# Patient Record
Sex: FEMALE | Race: WHITE | Employment: OTHER | URBAN - METROPOLITAN AREA
[De-identification: names, ages, dates, MRNs, and addresses within clinical notes are randomized per-mention and may not be internally consistent; named-entity substitution may affect disease eponyms.]

---

## 2018-03-29 ENCOUNTER — HOSPITAL ENCOUNTER (OUTPATIENT)
Dept: INTERVENTIONAL RADIOLOGY/VASCULAR | Age: 78
Discharge: HOME | End: 2018-03-31
Payer: MEDICARE

## 2018-03-29 DIAGNOSIS — M79.605 PAIN OF LEFT LOWER EXTREMITY: ICD-10-CM

## 2018-03-29 DIAGNOSIS — R60.0 EDEMA OF LEG: ICD-10-CM

## 2018-03-29 DIAGNOSIS — I73.9 PVD (PERIPHERAL VASCULAR DISEASE) (HCC): ICD-10-CM

## 2018-03-29 PROCEDURE — 93971 EXTREMITY STUDY: CPT

## 2018-03-29 PROCEDURE — 93923 UPR/LXTR ART STDY 3+ LVLS: CPT

## 2018-05-21 ENCOUNTER — HOSPITAL ENCOUNTER (OUTPATIENT)
Dept: GENERAL RADIOLOGY | Age: 78
Discharge: HOME | End: 2018-05-23
Payer: MEDICARE

## 2018-05-21 ENCOUNTER — HOSPITAL ENCOUNTER (OUTPATIENT)
Dept: CT IMAGING | Age: 78
Discharge: HOME | End: 2018-05-21
Payer: MEDICARE

## 2018-05-21 ENCOUNTER — HOSPITAL ENCOUNTER (OUTPATIENT)
Age: 78
Discharge: HOME | End: 2018-05-23
Payer: MEDICARE

## 2018-05-21 DIAGNOSIS — R51.9 ACUTE INTRACTABLE HEADACHE, UNSPECIFIED HEADACHE TYPE: ICD-10-CM

## 2018-05-21 DIAGNOSIS — M25.541 ARTHRALGIA OF BOTH HANDS: ICD-10-CM

## 2018-05-21 DIAGNOSIS — M25.542 ARTHRALGIA OF BOTH HANDS: ICD-10-CM

## 2018-05-21 PROCEDURE — 6360000004 HC RX CONTRAST MEDICATION: Performed by: RADIOLOGY

## 2018-05-21 PROCEDURE — 73030 X-RAY EXAM OF SHOULDER: CPT

## 2018-05-21 PROCEDURE — 70470 CT HEAD/BRAIN W/O & W/DYE: CPT

## 2018-05-21 RX ADMIN — IOPAMIDOL 50 ML: 755 INJECTION, SOLUTION INTRAVENOUS at 10:46

## 2019-01-03 ENCOUNTER — HOSPITAL ENCOUNTER (OUTPATIENT)
Dept: GENERAL RADIOLOGY | Age: 79
Discharge: HOME | End: 2019-01-05
Payer: MEDICARE

## 2019-01-03 ENCOUNTER — HOSPITAL ENCOUNTER (OUTPATIENT)
Dept: MAMMOGRAPHY | Age: 79
Discharge: HOME | End: 2019-01-05
Payer: MEDICARE

## 2019-01-03 ENCOUNTER — HOSPITAL ENCOUNTER (OUTPATIENT)
Age: 79
Discharge: HOME | End: 2019-01-05
Payer: MEDICARE

## 2019-01-03 ENCOUNTER — HOSPITAL ENCOUNTER (OUTPATIENT)
Dept: ULTRASOUND IMAGING | Age: 79
End: 2019-01-03
Payer: MEDICARE

## 2019-01-03 DIAGNOSIS — Z12.39 BREAST SCREENING: ICD-10-CM

## 2019-01-03 DIAGNOSIS — N64.4 BREAST PAIN: ICD-10-CM

## 2019-01-03 DIAGNOSIS — M25.552 LEFT HIP PAIN: ICD-10-CM

## 2019-01-03 PROCEDURE — 77066 DX MAMMO INCL CAD BI: CPT

## 2019-01-03 PROCEDURE — 73502 X-RAY EXAM HIP UNI 2-3 VIEWS: CPT

## 2020-01-16 ENCOUNTER — HOSPITAL ENCOUNTER (OUTPATIENT)
Dept: MAMMOGRAPHY | Age: 80
Discharge: HOME | End: 2020-01-18
Payer: MEDICARE

## 2020-01-16 PROCEDURE — 77067 SCR MAMMO BI INCL CAD: CPT

## 2020-02-19 ENCOUNTER — HOSPITAL ENCOUNTER (OUTPATIENT)
Dept: MAMMOGRAPHY | Age: 80
Discharge: HOME | End: 2020-02-21
Payer: MEDICARE

## 2020-02-19 ENCOUNTER — HOSPITAL ENCOUNTER (OUTPATIENT)
Dept: ULTRASOUND IMAGING | Age: 80
End: 2020-02-19
Payer: MEDICARE

## 2020-02-19 PROCEDURE — 77065 DX MAMMO INCL CAD UNI: CPT

## 2020-04-09 ENCOUNTER — APPOINTMENT (OUTPATIENT)
Dept: GENERAL RADIOLOGY | Age: 80
DRG: 444 | End: 2020-04-09
Payer: MEDICARE

## 2020-04-09 ENCOUNTER — HOSPITAL ENCOUNTER (INPATIENT)
Age: 80
LOS: 6 days | Discharge: NURSING, REHAB OR LONG-TERM CARE | DRG: 444 | End: 2020-04-15
Attending: EMERGENCY MEDICINE | Admitting: INTERNAL MEDICINE
Payer: MEDICARE

## 2020-04-09 ENCOUNTER — APPOINTMENT (OUTPATIENT)
Dept: CT IMAGING | Age: 80
DRG: 444 | End: 2020-04-09
Payer: MEDICARE

## 2020-04-09 PROBLEM — A41.9 SEPSIS (HCC): Status: ACTIVE | Noted: 2020-04-09

## 2020-04-09 PROBLEM — J96.01 ACUTE RESPIRATORY FAILURE WITH HYPOXIA (HCC): Status: ACTIVE | Noted: 2020-04-09

## 2020-04-09 LAB
ALBUMIN SERPL-MCNC: 3.5 G/DL (ref 3.5–5.2)
ALP BLD-CCNC: 1980 U/L (ref 35–104)
ALT SERPL-CCNC: 197 U/L (ref 0–32)
AMMONIA: 44 UMOL/L (ref 11–51)
ANION GAP SERPL CALCULATED.3IONS-SCNC: 23 MMOL/L (ref 7–16)
APTT: 33.3 SEC (ref 24.5–35.1)
AST SERPL-CCNC: 370 U/L (ref 0–31)
BACTERIA: ABNORMAL /HPF
BASOPHILS ABSOLUTE: 0.05 E9/L (ref 0–0.2)
BASOPHILS RELATIVE PERCENT: 0.3 % (ref 0–2)
BILIRUB SERPL-MCNC: 4.4 MG/DL (ref 0–1.2)
BILIRUBIN DIRECT: 2.8 MG/DL (ref 0–0.3)
BILIRUBIN URINE: ABNORMAL
BILIRUBIN, INDIRECT: 1.6 MG/DL (ref 0–1)
BLOOD, URINE: ABNORMAL
BUN BLDV-MCNC: 35 MG/DL (ref 8–23)
CA 125: 49.8 U/ML (ref 0–35)
CALCIUM SERPL-MCNC: 9.1 MG/DL (ref 8.6–10.2)
CHLORIDE BLD-SCNC: 93 MMOL/L (ref 98–107)
CLARITY: ABNORMAL
CO2: 18 MMOL/L (ref 22–29)
COLOR: ABNORMAL
CREAT SERPL-MCNC: 1.4 MG/DL (ref 0.5–1)
CREATININE URINE: 77 MG/DL (ref 29–226)
EOSINOPHILS ABSOLUTE: 0.33 E9/L (ref 0.05–0.5)
EOSINOPHILS RELATIVE PERCENT: 2.2 % (ref 0–6)
EPITHELIAL CELLS, UA: ABNORMAL /HPF
FERRITIN: 1536 NG/ML
FOLATE: 11.7 NG/ML (ref 4.8–24.2)
GAMMA GLUTAMYL TRANSFERASE: 1514 U/L (ref 6–42)
GFR AFRICAN AMERICAN: 44
GFR NON-AFRICAN AMERICAN: 36 ML/MIN/1.73
GLUCOSE BLD-MCNC: 166 MG/DL (ref 74–99)
GLUCOSE URINE: 100 MG/DL
HCT VFR BLD CALC: 24.2 % (ref 34–48)
HCT VFR BLD CALC: 25.4 % (ref 34–48)
HCT VFR BLD CALC: 29.6 % (ref 34–48)
HCT VFR BLD CALC: 29.9 % (ref 34–48)
HEMOGLOBIN: 8 G/DL (ref 11.5–15.5)
HEMOGLOBIN: 8.2 G/DL (ref 11.5–15.5)
HEMOGLOBIN: 9.7 G/DL (ref 11.5–15.5)
IMMATURE GRANULOCYTES #: 0.08 E9/L
IMMATURE GRANULOCYTES %: 0.5 % (ref 0–5)
IMMATURE RETIC FRACT: 9.7 % (ref 3–15.9)
INFLUENZA A BY PCR: NOT DETECTED
INFLUENZA B BY PCR: NOT DETECTED
INR BLD: 1
IRON SATURATION: 35 % (ref 15–50)
IRON: 107 MCG/DL (ref 37–145)
KETONES, URINE: ABNORMAL MG/DL
LACTIC ACID: 0.8 MMOL/L (ref 0.5–2.2)
LACTIC ACID: 1.2 MMOL/L (ref 0.5–2.2)
LACTIC ACID: 1.4 MMOL/L (ref 0.5–2.2)
LEUKOCYTE ESTERASE, URINE: NEGATIVE
LYMPHOCYTES ABSOLUTE: 1.7 E9/L (ref 1.5–4)
LYMPHOCYTES RELATIVE PERCENT: 11.1 % (ref 20–42)
MCH RBC QN AUTO: 30.6 PG (ref 26–35)
MCHC RBC AUTO-ENTMCNC: 32.8 % (ref 32–34.5)
MCV RBC AUTO: 93.4 FL (ref 80–99.9)
MONOCYTES ABSOLUTE: 1.13 E9/L (ref 0.1–0.95)
MONOCYTES RELATIVE PERCENT: 7.4 % (ref 2–12)
NEUTROPHILS ABSOLUTE: 12.03 E9/L (ref 1.8–7.3)
NEUTROPHILS RELATIVE PERCENT: 78.5 % (ref 43–80)
NITRITE, URINE: NEGATIVE
OSMOLALITY URINE: 421 MOSM/KG (ref 300–900)
PDW BLD-RTO: 14.7 FL (ref 11.5–15)
PH UA: 6 (ref 5–9)
PLATELET # BLD: 309 E9/L (ref 130–450)
PMV BLD AUTO: 11.8 FL (ref 7–12)
POTASSIUM SERPL-SCNC: 3.6 MMOL/L (ref 3.5–5)
PRO-BNP: 1063 PG/ML (ref 0–450)
PROCALCITONIN: 1.39 NG/ML (ref 0–0.08)
PROTEIN UA: >=300 MG/DL
PROTHROMBIN TIME: 11 SEC (ref 9.3–12.4)
RBC # BLD: 3.17 E12/L (ref 3.5–5.5)
RBC UA: ABNORMAL /HPF (ref 0–2)
RETIC HGB EQUIVALENT: 34.4 PG (ref 28.2–36.6)
RETICULOCYTE ABSOLUTE COUNT: 0.1 E12/L
RETICULOCYTE COUNT PCT: 3.2 % (ref 0.4–1.9)
SODIUM BLD-SCNC: 134 MMOL/L (ref 132–146)
SODIUM URINE: 43 MMOL/L
SPECIFIC GRAVITY UA: 1.02 (ref 1–1.03)
TOTAL CK: 3907 U/L (ref 20–180)
TOTAL IRON BINDING CAPACITY: 304 MCG/DL (ref 250–450)
TOTAL PROTEIN: 8.1 G/DL (ref 6.4–8.3)
TRANSFERRIN: 205 MG/DL (ref 200–360)
TROPONIN: 0.26 NG/ML (ref 0–0.03)
TROPONIN: 0.28 NG/ML (ref 0–0.03)
TROPONIN: 0.29 NG/ML (ref 0–0.03)
TROPONIN: 0.29 NG/ML (ref 0–0.03)
TROPONIN: 0.3 NG/ML (ref 0–0.03)
UROBILINOGEN, URINE: 2 E.U./DL
VITAMIN B-12: 752 PG/ML (ref 211–946)
WBC # BLD: 15.3 E9/L (ref 4.5–11.5)
WBC UA: ABNORMAL /HPF (ref 0–5)

## 2020-04-09 PROCEDURE — 6360000002 HC RX W HCPCS: Performed by: EMERGENCY MEDICINE

## 2020-04-09 PROCEDURE — 80048 BASIC METABOLIC PNL TOTAL CA: CPT

## 2020-04-09 PROCEDURE — 96375 TX/PRO/DX INJ NEW DRUG ADDON: CPT

## 2020-04-09 PROCEDURE — 84300 ASSAY OF URINE SODIUM: CPT

## 2020-04-09 PROCEDURE — 80074 ACUTE HEPATITIS PANEL: CPT

## 2020-04-09 PROCEDURE — 96365 THER/PROPH/DIAG IV INF INIT: CPT

## 2020-04-09 PROCEDURE — 82607 VITAMIN B-12: CPT

## 2020-04-09 PROCEDURE — 81001 URINALYSIS AUTO W/SCOPE: CPT

## 2020-04-09 PROCEDURE — 71045 X-RAY EXAM CHEST 1 VIEW: CPT

## 2020-04-09 PROCEDURE — 83605 ASSAY OF LACTIC ACID: CPT

## 2020-04-09 PROCEDURE — 82140 ASSAY OF AMMONIA: CPT

## 2020-04-09 PROCEDURE — 86304 IMMUNOASSAY TUMOR CA 125: CPT

## 2020-04-09 PROCEDURE — 82570 ASSAY OF URINE CREATININE: CPT

## 2020-04-09 PROCEDURE — 82746 ASSAY OF FOLIC ACID SERUM: CPT

## 2020-04-09 PROCEDURE — 85730 THROMBOPLASTIN TIME PARTIAL: CPT

## 2020-04-09 PROCEDURE — 84145 PROCALCITONIN (PCT): CPT

## 2020-04-09 PROCEDURE — 82550 ASSAY OF CK (CPK): CPT

## 2020-04-09 PROCEDURE — 99285 EMERGENCY DEPT VISIT HI MDM: CPT

## 2020-04-09 PROCEDURE — 71250 CT THORAX DX C-: CPT

## 2020-04-09 PROCEDURE — 87088 URINE BACTERIA CULTURE: CPT

## 2020-04-09 PROCEDURE — 83880 ASSAY OF NATRIURETIC PEPTIDE: CPT

## 2020-04-09 PROCEDURE — 93005 ELECTROCARDIOGRAM TRACING: CPT | Performed by: EMERGENCY MEDICINE

## 2020-04-09 PROCEDURE — 85025 COMPLETE CBC W/AUTO DIFF WBC: CPT

## 2020-04-09 PROCEDURE — 82977 ASSAY OF GGT: CPT

## 2020-04-09 PROCEDURE — 1200000000 HC SEMI PRIVATE

## 2020-04-09 PROCEDURE — 86038 ANTINUCLEAR ANTIBODIES: CPT

## 2020-04-09 PROCEDURE — 87040 BLOOD CULTURE FOR BACTERIA: CPT

## 2020-04-09 PROCEDURE — 83550 IRON BINDING TEST: CPT

## 2020-04-09 PROCEDURE — 85014 HEMATOCRIT: CPT

## 2020-04-09 PROCEDURE — 0100U HC RESPIRPTHGN MULT REV TRANS & AMP PRB TECH 21 TRGT: CPT

## 2020-04-09 PROCEDURE — 80076 HEPATIC FUNCTION PANEL: CPT

## 2020-04-09 PROCEDURE — 2580000003 HC RX 258: Performed by: EMERGENCY MEDICINE

## 2020-04-09 PROCEDURE — 83935 ASSAY OF URINE OSMOLALITY: CPT

## 2020-04-09 PROCEDURE — 85018 HEMOGLOBIN: CPT

## 2020-04-09 PROCEDURE — 85045 AUTOMATED RETICULOCYTE COUNT: CPT

## 2020-04-09 PROCEDURE — 85610 PROTHROMBIN TIME: CPT

## 2020-04-09 PROCEDURE — 84466 ASSAY OF TRANSFERRIN: CPT

## 2020-04-09 PROCEDURE — 82728 ASSAY OF FERRITIN: CPT

## 2020-04-09 PROCEDURE — 93005 ELECTROCARDIOGRAM TRACING: CPT | Performed by: INTERNAL MEDICINE

## 2020-04-09 PROCEDURE — 87502 INFLUENZA DNA AMP PROBE: CPT

## 2020-04-09 PROCEDURE — 86255 FLUORESCENT ANTIBODY SCREEN: CPT

## 2020-04-09 PROCEDURE — 84484 ASSAY OF TROPONIN QUANT: CPT

## 2020-04-09 PROCEDURE — 74176 CT ABD & PELVIS W/O CONTRAST: CPT

## 2020-04-09 PROCEDURE — 83540 ASSAY OF IRON: CPT

## 2020-04-09 PROCEDURE — 2580000003 HC RX 258: Performed by: INTERNAL MEDICINE

## 2020-04-09 PROCEDURE — 86301 IMMUNOASSAY TUMOR CA 19-9: CPT

## 2020-04-09 PROCEDURE — 82105 ALPHA-FETOPROTEIN SERUM: CPT

## 2020-04-09 PROCEDURE — 36415 COLL VENOUS BLD VENIPUNCTURE: CPT

## 2020-04-09 PROCEDURE — 99222 1ST HOSP IP/OBS MODERATE 55: CPT | Performed by: INTERNAL MEDICINE

## 2020-04-09 RX ORDER — SODIUM CHLORIDE 0.9 % (FLUSH) 0.9 %
10 SYRINGE (ML) INJECTION EVERY 12 HOURS SCHEDULED
Status: DISCONTINUED | OUTPATIENT
Start: 2020-04-09 | End: 2020-04-15 | Stop reason: HOSPADM

## 2020-04-09 RX ORDER — 0.9 % SODIUM CHLORIDE 0.9 %
1000 INTRAVENOUS SOLUTION INTRAVENOUS ONCE
Status: COMPLETED | OUTPATIENT
Start: 2020-04-09 | End: 2020-04-09

## 2020-04-09 RX ORDER — SODIUM CHLORIDE 0.9 % (FLUSH) 0.9 %
10 SYRINGE (ML) INJECTION PRN
Status: DISCONTINUED | OUTPATIENT
Start: 2020-04-09 | End: 2020-04-15 | Stop reason: HOSPADM

## 2020-04-09 RX ORDER — ATORVASTATIN CALCIUM 80 MG/1
80 TABLET, FILM COATED ORAL NIGHTLY
COMMUNITY
End: 2020-08-30

## 2020-04-09 RX ORDER — SODIUM CHLORIDE 9 MG/ML
INJECTION, SOLUTION INTRAVENOUS CONTINUOUS
Status: DISCONTINUED | OUTPATIENT
Start: 2020-04-09 | End: 2020-04-10

## 2020-04-09 RX ORDER — ASPIRIN 81 MG/1
324 TABLET, CHEWABLE ORAL ONCE
Status: DISCONTINUED | OUTPATIENT
Start: 2020-04-09 | End: 2020-04-15 | Stop reason: HOSPADM

## 2020-04-09 RX ORDER — IBUPROFEN 200 MG
400 TABLET ORAL EVERY 6 HOURS PRN
Status: ON HOLD | COMMUNITY
End: 2020-04-15 | Stop reason: HOSPADM

## 2020-04-09 RX ORDER — ATORVASTATIN CALCIUM 40 MG/1
40 TABLET, FILM COATED ORAL NIGHTLY
Status: DISCONTINUED | OUTPATIENT
Start: 2020-04-09 | End: 2020-04-09

## 2020-04-09 RX ADMIN — SODIUM CHLORIDE: 9 INJECTION, SOLUTION INTRAVENOUS at 11:03

## 2020-04-09 RX ADMIN — Medication 10 ML: at 11:03

## 2020-04-09 RX ADMIN — AZITHROMYCIN MONOHYDRATE 500 MG: 500 INJECTION, POWDER, LYOPHILIZED, FOR SOLUTION INTRAVENOUS at 07:46

## 2020-04-09 RX ADMIN — WATER 2 G: 1 INJECTION INTRAMUSCULAR; INTRAVENOUS; SUBCUTANEOUS at 07:44

## 2020-04-09 RX ADMIN — Medication 10 ML: at 19:59

## 2020-04-09 RX ADMIN — SODIUM CHLORIDE 1000 ML: 9 INJECTION, SOLUTION INTRAVENOUS at 07:48

## 2020-04-09 ASSESSMENT — ENCOUNTER SYMPTOMS
SINUS PRESSURE: 0
EYE DISCHARGE: 0
VOMITING: 0
NAUSEA: 0
EYE REDNESS: 0
EYE PAIN: 0
DIARRHEA: 0
BACK PAIN: 0
SHORTNESS OF BREATH: 0
ABDOMINAL DISTENTION: 0
COUGH: 0
WHEEZING: 0
SORE THROAT: 0

## 2020-04-09 NOTE — ED PROVIDER NOTES
antibiotics; Vicodin [hydrocodone-acetaminophen]; Morphine; Penicillins;  Tape [adhesive tape]; and Versed [midazolam]    -------------------------------------------------- RESULTS -------------------------------------------------    Lab  Results for orders placed or performed during the hospital encounter of 04/09/20   CBC auto differential   Result Value Ref Range    WBC 15.3 (H) 4.5 - 11.5 E9/L    RBC 3.17 (L) 3.50 - 5.50 E12/L    Hemoglobin 9.7 (L) 11.5 - 15.5 g/dL    Hematocrit 29.6 (L) 34.0 - 48.0 %    MCV 93.4 80.0 - 99.9 fL    MCH 30.6 26.0 - 35.0 pg    MCHC 32.8 32.0 - 34.5 %    RDW 14.7 11.5 - 15.0 fL    Platelets 301 521 - 893 E9/L    MPV 11.8 7.0 - 12.0 fL    Neutrophils % 78.5 43.0 - 80.0 %    Immature Granulocytes % 0.5 0.0 - 5.0 %    Lymphocytes % 11.1 (L) 20.0 - 42.0 %    Monocytes % 7.4 2.0 - 12.0 %    Eosinophils % 2.2 0.0 - 6.0 %    Basophils % 0.3 0.0 - 2.0 %    Neutrophils Absolute 12.03 (H) 1.80 - 7.30 E9/L    Immature Granulocytes # 0.08 E9/L    Lymphocytes Absolute 1.70 1.50 - 4.00 E9/L    Monocytes Absolute 1.13 (H) 0.10 - 0.95 E9/L    Eosinophils Absolute 0.33 0.05 - 0.50 E9/L    Basophils Absolute 0.05 0.00 - 0.20 Q4/O   Basic Metabolic Panel   Result Value Ref Range    Sodium 134 132 - 146 mmol/L    Potassium 3.6 3.5 - 5.0 mmol/L    Chloride 93 (L) 98 - 107 mmol/L    CO2 18 (L) 22 - 29 mmol/L    Anion Gap 23 (H) 7 - 16 mmol/L    Glucose 166 (H) 74 - 99 mg/dL    BUN 35 (H) 8 - 23 mg/dL    CREATININE 1.4 (H) 0.5 - 1.0 mg/dL    GFR Non-African American 36 >=60 mL/min/1.73    GFR African American 44     Calcium 9.1 8.6 - 10.2 mg/dL   Hepatic function panel   Result Value Ref Range    Total Protein 8.1 6.4 - 8.3 g/dL    Alb 3.5 3.5 - 5.2 g/dL    Alkaline Phosphatase 1,980 (H) 35 - 104 U/L     (H) 0 - 32 U/L     (H) 0 - 31 U/L    Total Bilirubin 4.4 (H) 0.0 - 1.2 mg/dL    Bilirubin, Direct 2.8 (H) 0.0 - 0.3 mg/dL    Bilirubin, Indirect 1.6 (H) 0.0 - 1.0 mg/dL   Protime-INR   Result PROGRESS NOTES ------------------------------------------    I have spoken with the patient and discussed todays results, in addition to providing specific details for the plan of care and counseling regarding the diagnosis and prognosis. Their questions are answered at this time and they are agreeable with the plan.      --------------------------------- ADDITIONAL PROVIDER NOTES ---------------------------------  Consultations:  Spoke with Dr. Ilana Hernandez,  They will admit this patient. This patient's ED course included: a personal history and physicial examination and multiple bedside re-evaluations    This patient has been closely monitored during their ED course. Please note that the withdrawal or failure to initiate urgent interventions for this patient would likely result in a life threatening deterioration or permanent disability. Accordingly this patient received 0 minutes of critical care time, excluding separately billable procedures. Clinical Impression  1. Septicemia ([facility] [address]    2. Hepatorenal failure ([facility] [address]    3. Pneumonia due to organism          Disposition  Patient's disposition: Admit to telemetry  Patient's condition is stable.        Vani Pacheco, DO  04/09/20 [id number] Mk Phillips Md, Dr, DO  04/09/20 [id number] Mk Phillips Md, Dr, DO  04/09/20 [id number]

## 2020-04-09 NOTE — ED NOTES
Pt presents to ED with c/o cough and fever. Pt arrived afebrile and with normal VS. Pt reports cough for \"sometime. \" Pt denies cough as being productive. Breath sounds with inspiratory/expiratory wheezes in upper lobes and CTA in lower lobes. Pt denies CP and SOB. Pt denies abd pain. BS active x 4. Pt denies pain with palpation. Pt reports generalized fatigue and weakness. Pt skin and sclera are jaundice. Pt placed on cardiac monitor and continuous pulse oximetry. Pt is A&O x 4. Call light within reach. Bed In lowest position. Both side-rails up. NAD noted. Will continue to monitor.         Anoop Valdes RN  04/09/20 [id number]

## 2020-04-09 NOTE — ED NOTES
Florence Hickman approved the pt to eat, she was given a breakfast tray.       Mathew Whitney, ARMANDO  04/09/20 [id number]

## 2020-04-09 NOTE — CONSULTS
[facility] Cardiology consult  Dr. Odilia Ortega      Reason for Consult: Elevated troponin  Requesting Physician: Mayra Benites DO  CHIEF COMPLAINT: Weakness  History Obtained From: patient  HISTORY OF PRESENT ILLNESS:   Patient is 70years old female with a COPD, hypertension, type 2 diabetes mellitus, was admitted to the hospital with weakness and multiple falls, patient was found to have elevated troponin, cardiology was consulted. Patient stated for the last couple of weeks, she has been having progressive weakness, weakness was significant enough that she was not even able to ambulate without assistance, multiple falls, symptoms are significant enough that prompted her family to bring her to the hospital for evaluation, patient did not try any treatment for her symptoms, denies any chest pain, shortness of breath is at baseline, occasional palpitations, occasional pedal edema, no orthopnea, no PND, no syncope, no presyncopal episodes.     Past Medical History:   Diagnosis Date    Anxiety     Asthma     Blind one eye     right eye    CAD (coronary artery disease)     Carpal tunnel syndrome, bilateral     COPD (chronic obstructive pulmonary disease) (HCC)     patient denies    Hypertension     IBS (irritable bowel syndrome)     patient denies having IBS    Macular degeneration bilateral    PONV (postoperative nausea and vomiting)     Type II or unspecified type diabetes mellitus without mention of complication, not stated as uncontrolled     UTI (urinary tract infection)     susceptible       Past Surgical History:   Procedure Laterality Date    ABDOMINAL AORTIC ANEURYSM REPAIR      APPENDECTOMY      CARDIAC SURGERY  1991    abdominal aortic bypass    CARPAL TUNNEL RELEASE  2005    right    CARPAL TUNNEL RELEASE  3/27/12    right    CARPAL TUNNEL RELEASE Left 07/01/2016    CERVICAL DISCECTOMY      CORONARY ARTERY BYPASS GRAFT      patient denies having heart surgery  9/9/15    EYE SURGERY Social Needs    Financial resource strain: Not on file    Food insecurity     Worry: Not on file     Inability: Not on file    Transportation needs     Medical: Not on file     Non-medical: Not on file   Tobacco Use    Smoking status: Former Smoker    Smokeless tobacco: Current User   Substance and Sexual Activity    Alcohol use: No    Drug use: No    Sexual activity: Not on file   Lifestyle    Physical activity     Days per week: Not on file     Minutes per session: Not on file    Stress: Not on file   Relationships    Social connections     Talks on phone: Not on file     Gets together: Not on file     Attends Hindu service: Not on file     Active member of club or organization: Not on file     Attends meetings of clubs or organizations: Not on file     Relationship status: Not on file    Intimate partner violence     Fear of current or ex partner: Not on file     Emotionally abused: Not on file     Physically abused: Not on file     Forced sexual activity: Not on file   Other Topics Concern    Not on file   Social History Narrative    Not on file       Brother had history of CAD  of heart disease in his late 45s    REVIEW OF SYSTEMS:   CONSTITUTIONAL:  negative for  fevers, chills, sweats, + fatigue  EYES:  negative for  double vision, blurred vision and blind spots  HEENT:  negative for  tinnitus, earaches, nasal congestion and epistaxis  RESPIRATORY:  negative for  dry cough, cough with sputum, wheezing and hemoptysis  CARDIOVASCULAR: as per HPI  GASTROINTESTINAL: + Nausea, decreased appetite, negative for diarrhea, constipation, pruritus and jaundice  GENITOURINARY:  negative for frequency, dysuria, nocturia, urinary incontinence and hesitancy  HEMATOLOGIC/LYMPHATIC:  negative for easy bruising, bleeding, lymphadenopathy and petechiae  ALLERGIC/IMMUNOLOGIC:  negative for urticaria, hay fever and angioedema  ENDOCRINE:  negative for heat intolerance, cold intolerance, tremor, hair loss current treatment  2.  Echocardiogram  3. Basic metabolic panel in a.m.  4.  Serial troponin  5. Further cardiac recommendations will be forthcoming pending her clinical course and diagnostic test findings    I have reviewed my findings and recommendations with patient no family at bedside    Thank you for the consult  Electronically signed by Marisol Mckinney MD on 4/9/2020 at 6:13 PM  NOTE: This report was transcribed using voice recognition software.  Every effort was made to ensure accuracy; however, inadvertent computerized transcription errors may be present

## 2020-04-09 NOTE — CONSULTS
07/01/2016    CERVICAL DISCECTOMY      CORONARY ARTERY BYPASS GRAFT      patient denies having heart surgery  9/9/15    EYE SURGERY Left     cataract extraction both eye    HYSTERECTOMY      LAPAROSCOPY      X6    NERVE BLOCK  11 30 2011    therapeutic caudal with epiduragram #1    TONSILLECTOMY AND ADENOIDECTOMY      UPPER GASTROINTESTINAL ENDOSCOPY  september 2015       Home Medications:  Prior to Admission medications    Medication Sig Start Date End Date Taking? Authorizing Provider   ibuprofen (ADVIL;MOTRIN) 200 MG tablet Take 400 mg by mouth every 6 hours as needed for Pain   Yes Historical Provider, MD   atorvastatin (LIPITOR) 80 MG tablet Take 80 mg by mouth nightly    Yes Historical Provider, MD   Doxylamine Succinate, Sleep, (SLEEP AID PO) Take 1 tablet by mouth nightly   Yes Historical Provider, MD   influenza virus trivalent vaccine (FLUZONE) injection Inject 0.5 mLs into the muscle once Given 11/2019   Yes Historical Provider, MD   Lutein 6 MG TABS Take 1 tablet by mouth 2 times daily    Yes Historical Provider, MD   Cholecalciferol (VITAMIN D3) 2000 UNITS TABS Take 1 tablet by mouth daily. Yes Historical Provider, MD   Multiple Vitamins-Minerals (VITEYES AREDS ADVANCED PO) Take 1 tablet by mouth 2 times daily. Yes Historical Provider, MD   aspirin 81 MG EC tablet Take 81 mg by mouth daily    Yes Historical Provider, MD       Allergies: Nickel; Other; Sulfa antibiotics; Vicodin [hydrocodone-acetaminophen]; Morphine; Penicillins;  Tape Coretha Bora tape]; and Versed [midazolam]    Social History:  Social History     Socioeconomic History    Marital status:      Spouse name: Not on file    Number of children: Not on file    Years of education: Not on file    Highest education level: Not on file   Occupational History    Not on file   Social Needs    Financial resource strain: Not on file    Food insecurity     Worry: Not on file     Inability: Not on file   Thucy needs     Medical: Not on file     Non-medical: Not on file   Tobacco Use    Smoking status: Former Smoker    Smokeless tobacco: Current User   Substance and Sexual Activity    Alcohol use: No    Drug use: No    Sexual activity: Not on file   Lifestyle    Physical activity     Days per week: Not on file     Minutes per session: Not on file    Stress: Not on file   Relationships    Social connections     Talks on phone: Not on file     Gets together: Not on file     Attends Mu-ism service: Not on file     Active member of club or organization: Not on file     Attends meetings of clubs or organizations: Not on file     Relationship status: Not on file    Intimate partner violence     Fear of current or ex partner: Not on file     Emotionally abused: Not on file     Physically abused: Not on file     Forced sexual activity: Not on file   Other Topics Concern    Not on file   Social History Narrative    Not on file       Family History:  History reviewed. No pertinent family history. PHYSICAL EXAM:  Vital Signs: BP (!) 101/49   Pulse 90   Temp 97.4 °F (36.3 °C) (Oral)   Resp 16   Ht 4' 11\" (1.499 m)   Wt 148 lb (67.1 kg)   SpO2 94%   BMI 29.89 kg/m²   GENERAL APPEARANCE:  awake, alert, oriented, cooperative, and in no acute distress  EYES:  Lids and lashes normal, PERRLA, EOMI, sclera jaudiced conjunctiva normal  HENT:  Normocephalic, without obvious abnormality, atramatic, oral pharynx with moist mucus membranes, tonsils without erythema or exudates  NECK:  Supple with no carotid bruits, JVD or thyromegaly. No cervical adenopathy  LUNGS:  Clear to auscultation bilaterally with no wheezes, rales or rhonchi. No increased work of breathing, good air exchange.   CARDIOVASCULAR: Regular rate and rhythm, no murmur  ABDOMEN:  normal bowel sounds in all 4 quadrants, soft, non-distended, non-tender, no masses palpated, no hepatosplenomegally  MUSCULOSKELETAL:  There is no redness, warmth, or swelling American 36 >=60 mL/min/1.73    GFR African American 44     Calcium 9.1 8.6 - 10.2 mg/dL   Hepatic function panel   Result Value Ref Range    Total Protein 8.1 6.4 - 8.3 g/dL    Alb 3.5 3.5 - 5.2 g/dL    Alkaline Phosphatase 1,980 (H) 35 - 104 U/L     (H) 0 - 32 U/L     (H) 0 - 31 U/L    Total Bilirubin 4.4 (H) 0.0 - 1.2 mg/dL    Bilirubin, Direct 2.8 (H) 0.0 - 0.3 mg/dL    Bilirubin, Indirect 1.6 (H) 0.0 - 1.0 mg/dL   Protime-INR   Result Value Ref Range    Protime 11.0 9.3 - 12.4 sec    INR 1.0    APTT   Result Value Ref Range    aPTT 33.3 24.5 - 35.1 sec   Troponin   Result Value Ref Range    Troponin 0.29 (H) 0.00 - 0.03 ng/mL   Ammonia   Result Value Ref Range    Ammonia 44.0 11.0 - 51.0 umol/L   Urinalysis   Result Value Ref Range    Color, UA PEPE (A) Straw/Yellow    Clarity, UA SLCLOUDY Clear    Glucose, Ur 100 (A) Negative mg/dL    Bilirubin Urine MODERATE (A) Negative    Ketones, Urine TRACE (A) Negative mg/dL    Specific Gravity, UA 1.025 1.005 - 1.030    Blood, Urine LARGE (A) Negative    pH, UA 6.0 5.0 - 9.0    Protein, UA >=300 (A) Negative mg/dL    Urobilinogen, Urine 2.0 (A) <2.0 E.U./dL    Nitrite, Urine Negative Negative    Leukocyte Esterase, Urine Negative Negative   Lactic Acid, Plasma   Result Value Ref Range    Lactic Acid 1.2 0.5 - 2.2 mmol/L   Microscopic Urinalysis   Result Value Ref Range    WBC, UA 0-1 0 - 5 /HPF    RBC, UA 1-3 0 - 2 /HPF    Epithelial Cells, UA RARE /HPF    Bacteria, UA RARE (A) None Seen /HPF   Reticulocytes   Result Value Ref Range    Retic Ct Pct 3.2 (H) 0.4 - 1.9 %    Retic Ct Abs 0.102 E12/L    Immature Retic Fract 9.7 3.0 - 15.9 %    Hematocrit 29.9 (L) 34.0 - 48.0 %    Retic HGB Equivalent 34.4 28.2 - 36.6 pg   Vitamin B12 & Folate   Result Value Ref Range    Vitamin B-12 752 211 - 946 pg/mL    Folate 11.7 4.8 - 24.2 ng/mL   Brain Natriuretic Peptide   Result Value Ref Range    Pro-BNP 1,063 (H) 0 - 450 pg/mL   Troponin   Result Value Ref Range identifiable cause on this noncontrast exam; however, there is abrupt cutoff of the common bile duct distally, near the ampulla of Vater. Differential includes ampullary mass. Consider MRCP for further evaluation. 3. Broad-based anterior abdominal hernia contains the anterior wall of the transverse colon. No evidence of obstruction or inflammation. 4. Atherosclerosis and coronary artery disease. 5. Hysterectomy. 6. Multiple sub 5 mm nodules as listed above. Recommend CT chest follow-up in 12 months. Ct Chest Wo Contrast    Result Date: 4/9/2020  EXAMINATION: CT OF THE CHEST WITHOUT CONTRAST; CT OF THE ABDOMEN AND PELVIS WITHOUT CONTRAST 4/9/2020 8:04 am TECHNIQUE: CT of the chest was performed without the administration of intravenous contrast. Multiplanar reformatted images are provided for review. Dose modulation, iterative reconstruction, and/or weight based adjustment of the mA/kV was utilized to reduce the radiation dose to as low as reasonably achievable.; CT of the abdomen and pelvis was performed without the administration of intravenous contrast. Multiplanar reformatted images are provided for review. Dose modulation, iterative reconstruction, and/or weight based adjustment of the mA/kV was utilized to reduce the radiation dose to as low as reasonably achievable. COMPARISON: CT abdomen and pelvis dated April 29, 2016. No comparison for the chest portion of the exam. HISTORY: ORDERING SYSTEM PROVIDED HISTORY: Evaluate for ground- PROVIDED HISTORY: Reason for exam:-> Evaluate for ground-glass ORDERING SYSTEM PROVIDED HISTORY: Evaluate pancreas TECHNOLOGIST PROVIDED HISTORY: Reason for exam:-> Evaluate pancreas Additional Contrast?->None Cough, fever, and abdominal pain. Jaundice. Initial encounter. FINDINGS: Chest: Mediastinum: Heart size is normal.  No pericardial effusion. Coronary artery atherosclerosis. Thoracic aorta is atherosclerotic.   Within the limitations of a ampullary mass. Consider MRCP for further evaluation. 3. Broad-based anterior abdominal hernia contains the anterior wall of the transverse colon. No evidence of obstruction or inflammation. 4. Atherosclerosis and coronary artery disease. 5. Hysterectomy. 6. Multiple sub 5 mm nodules as listed above. Recommend CT chest follow-up in 12 months. Xr Chest Portable    Result Date: 4/9/2020  EXAMINATION: ONE XRAY VIEW OF THE CHEST 4/9/2020 6:32 am COMPARISON: None HISTORY: ORDERING SYSTEM PROVIDED HISTORY: fever, generalized weakness TECHNOLOGIST PROVIDED HISTORY: Reason for exam:->fever, generalized weakness FINDINGS: Bibasilar airspace opacities with small bilateral pleural effusions. Cardiac silhouette is within normal range. No pneumothorax. Lower cervical spine fusion. 1. Bibasilar airspace opacities. Given the clinical context, differential includes pneumonia, including atypical/viral pneumonia. .         ASSESSMENT/PLAN:      1. Dilated Intrahepatic and Extrahepatic Biliary  Ducts with concern for ampullary  mass   - Obtain MRCP for better evaluation of findings on  Non contrasted ct abdomen   - Pending MRCP pt will require either ERCP vs EUS for evaluation and tissue diagnose     2. Elevated LFTs  -Alkaline phosphatase 1980 obtain to ggt to rule out bone as source of elevation   - Will obtain , RODNEY, AMA, Acute hepatitis panel to evaluate, no evidence of cirrhosis on exam or on lab work   - pt denies any hx of ETOH abuse   - MRCP as above     3. Normocytic Anemia   - VSS No overt GI Bleed on exam   - Hgb 9.7 this am, last Hgb 12.7 in  2016   - Folate and B12 within normal limits   - Obtain iron studies to evaluate   - Continue supportive care   - Okay for diet from GI POV   - Colonoscopy and EGD remote hx per daught     4. Right Lower Lobe Nodule   - Pt continued to smoke   - concern for possible malignancy  - per admitting       5.  Acute Kidney Injury  - per admitting          Pts daughter Grace Dry at

## 2020-04-09 NOTE — H&P
°C)     TempSrc: Oral Oral     SpO2: 97% 99%  97%   Weight: 148 lb (67.1 kg)      Height: 4' 11\" (1.499 m)            Histories  Past Medical History:   Diagnosis Date    Anxiety     Asthma     Blind one eye     right eye    CAD (coronary artery disease)     Carpal tunnel syndrome, bilateral     COPD (chronic obstructive pulmonary disease) ([facility] [address]     patient denies    Hypertension     IBS (irritable bowel syndrome)     patient denies having IBS    Macular degeneration bilateral    PONV (postoperative nausea and vomiting)     Type II or unspecified type diabetes mellitus without mention of complication, not stated as uncontrolled     UTI (urinary tract infection)     susceptible     Past Surgical History:   Procedure Laterality Date    ABDOMINAL AORTIC ANEURYSM REPAIR      APPENDECTOMY      CARDIAC SURGERY  1991    abdominal aortic bypass    CARPAL TUNNEL RELEASE  2005    right    CARPAL TUNNEL RELEASE  3/27/12    right    CARPAL TUNNEL RELEASE Left 07/01/2016    CERVICAL DISCECTOMY      CORONARY ARTERY BYPASS GRAFT      patient denies having heart surgery  9/9/15    EYE SURGERY Left     cataract extraction both eye    HYSTERECTOMY      LAPAROSCOPY      X6    NERVE BLOCK  11 30 2011    therapeutic caudal with epiduragram #1    TONSILLECTOMY AND ADENOIDECTOMY      UPPER GASTROINTESTINAL ENDOSCOPY  september 2015     History reviewed. No pertinent family history. Home Medications  Prior to Admission medications    Medication Sig Start Date End Date Taking?  Authorizing Provider   ibuprofen (ADVIL;MOTRIN) 200 MG tablet Take 400 mg by mouth every 6 hours as needed for Pain   Yes Historical Provider, MD   atorvastatin (LIPITOR) 80 MG tablet Take 80 mg by mouth nightly    Yes Historical Provider, MD   Doxylamine Succinate, Sleep, (SLEEP AID PO) Take 1 tablet by mouth nightly   Yes Historical Provider, MD   influenza virus trivalent vaccine (FLUZONE) injection Inject 0.5 mLs into the muscle once Basophils % 0.3 0.0 - 2.0 %    Neutrophils Absolute 12.03 (H) 1.80 - 7.30 E9/L    Immature Granulocytes # 0.08 E9/L    Lymphocytes Absolute 1.70 1.50 - 4.00 E9/L    Monocytes Absolute 1.13 (H) 0.10 - 0.95 E9/L    Eosinophils Absolute 0.33 0.05 - 0.50 E9/L    Basophils Absolute 0.05 0.00 - 0.20 C8/S   Basic Metabolic Panel    Collection Time: 04/09/20  6:15 AM   Result Value Ref Range    Sodium 134 132 - 146 mmol/L    Potassium 3.6 3.5 - 5.0 mmol/L    Chloride 93 (L) 98 - 107 mmol/L    CO2 18 (L) 22 - 29 mmol/L    Anion Gap 23 (H) 7 - 16 mmol/L    Glucose 166 (H) 74 - 99 mg/dL    BUN 35 (H) 8 - 23 mg/dL    CREATININE 1.4 (H) 0.5 - 1.0 mg/dL    GFR Non-African American 36 >=60 mL/min/1.73    GFR African American 44     Calcium 9.1 8.6 - 10.2 mg/dL   Hepatic function panel    Collection Time: 04/09/20  6:15 AM   Result Value Ref Range    Total Protein 8.1 6.4 - 8.3 g/dL    Alb 3.5 3.5 - 5.2 g/dL    Alkaline Phosphatase 1,980 (H) 35 - 104 U/L     (H) 0 - 32 U/L     (H) 0 - 31 U/L    Total Bilirubin 4.4 (H) 0.0 - 1.2 mg/dL    Bilirubin, Direct 2.8 (H) 0.0 - 0.3 mg/dL    Bilirubin, Indirect 1.6 (H) 0.0 - 1.0 mg/dL   Protime-INR    Collection Time: 04/09/20  6:15 AM   Result Value Ref Range    Protime 11.0 9.3 - 12.4 sec    INR 1.0    APTT    Collection Time: 04/09/20  6:15 AM   Result Value Ref Range    aPTT 33.3 24.5 - 35.1 sec   Troponin    Collection Time: 04/09/20  6:15 AM   Result Value Ref Range    Troponin 0.29 (H) 0.00 - 0.03 ng/mL   Ammonia    Collection Time: 04/09/20  6:15 AM   Result Value Ref Range    Ammonia 44.0 11.0 - 51.0 umol/L   Urinalysis    Collection Time: 04/09/20  6:15 AM   Result Value Ref Range    Color, UA PEPE (A) Straw/Yellow    Clarity, UA SLCLOUDY Clear    Glucose, Ur 100 (A) Negative mg/dL    Bilirubin Urine MODERATE (A) Negative    Ketones, Urine TRACE (A) Negative mg/dL    Specific Gravity, UA 1.025 1.005 - 1.030    Blood, Urine LARGE (A) Negative    pH, UA 6.0 5.0 - 9.0 Evaluate for ground- PROVIDED HISTORY: Reason for exam:-> Evaluate for ground-glass ORDERING SYSTEM PROVIDED HISTORY: Evaluate pancreas TECHNOLOGIST PROVIDED HISTORY: Reason for exam:-> Evaluate pancreas Additional Contrast?->None Cough, fever, and abdominal pain. Jaundice. Initial encounter. FINDINGS: Chest: Mediastinum: Heart size is normal.  No pericardial effusion. Coronary artery atherosclerosis. Thoracic aorta is atherosclerotic. Within the limitations of a noncontrast exam, there is no evidence of hilar adenopathy. No mediastinal adenopathy. Rim calcifications in the right lobe of the thyroid. Lungs/pleura: 1.4 x 1.0 cm right lower lobe nodule with cavity. No pleural effusion, pneumothorax, or evidence of pulmonary edema. There is no focal consolidation. 3 mm nodule right upper lobe (image 33). 3 mm left upper lobe nodule (image 33). 2 mm nodule in the left upper lobe (image 24). 4 mm left lower lobe nodule (image 79). Soft Tissues/Bones: No aggressive lytic or blastic bony lesion. Abdomen/Pelvis: Organs: Intra and extrahepatic ductal dilatation. Unenhanced spleen, pancreas, and adrenal glands are unremarkable. 2.1 cm right upper pole exophytic mass, likely a cyst based on its attenuation. Nonobstructing 1 mm right renal stone. No hydronephrosis or perinephric stranding. Exophytic hypodense left renal cortical mass, suspected to be a cyst but not well assessed on this noncontrast exam.  Bibasilar pulmonary atelectasis. GI/Bowel: No evidence of bowel obstruction or free intraperitoneal air. Broad-based umbilical hernia contains a portion of the transverse colon anterior wall. No associated evidence of obstruction or bowel wall thickening. Appendix is not seen. Pelvis: Copeland catheter decompresses the urinary bladder. Uterus is absent. No free fluid in the pelvis. Peritoneum/Retroperitoneum: Abdominal aorta is atherosclerotic. No retroperitoneal adenopathy.  Bones/Soft Tissues:

## 2020-04-10 ENCOUNTER — APPOINTMENT (OUTPATIENT)
Dept: MRI IMAGING | Age: 80
DRG: 444 | End: 2020-04-10
Payer: MEDICARE

## 2020-04-10 PROBLEM — I21.4 NSTEMI (NON-ST ELEVATED MYOCARDIAL INFARCTION) (HCC): Status: ACTIVE | Noted: 2020-04-10

## 2020-04-10 LAB
ADENOVIRUS BY PCR: NOT DETECTED
ALBUMIN SERPL-MCNC: 2.5 G/DL (ref 3.5–5.2)
ALP BLD-CCNC: 1623 U/L (ref 35–104)
ALT SERPL-CCNC: 180 U/L (ref 0–32)
ANION GAP SERPL CALCULATED.3IONS-SCNC: 13 MMOL/L (ref 7–16)
ANTI-MITOCHONDRIAL AB, IFA: NEGATIVE
ANTI-NUCLEAR ANTIBODY (ANA): NEGATIVE
AST SERPL-CCNC: 330 U/L (ref 0–31)
BASOPHILS ABSOLUTE: 0.05 E9/L (ref 0–0.2)
BASOPHILS RELATIVE PERCENT: 0.5 % (ref 0–2)
BILIRUB SERPL-MCNC: 2.7 MG/DL (ref 0–1.2)
BORDETELLA PARAPERTUSSIS BY PCR: NOT DETECTED
BORDETELLA PERTUSSIS BY PCR: NOT DETECTED
BUN BLDV-MCNC: 28 MG/DL (ref 8–23)
CALCIUM SERPL-MCNC: 7.8 MG/DL (ref 8.6–10.2)
CEA: 11.3 NG/ML (ref 0–5.2)
CHLAMYDOPHILIA PNEUMONIAE BY PCR: NOT DETECTED
CHLORIDE BLD-SCNC: 101 MMOL/L (ref 98–107)
CHOLESTEROL, TOTAL: 251 MG/DL (ref 0–199)
CO2: 18 MMOL/L (ref 22–29)
CORONAVIRUS 229E BY PCR: NOT DETECTED
CORONAVIRUS HKU1 BY PCR: NOT DETECTED
CORONAVIRUS NL63 BY PCR: NOT DETECTED
CORONAVIRUS OC43 BY PCR: NOT DETECTED
CREAT SERPL-MCNC: 1.2 MG/DL (ref 0.5–1)
EKG ATRIAL RATE: 91 BPM
EKG ATRIAL RATE: 93 BPM
EKG P AXIS: 74 DEGREES
EKG P AXIS: 83 DEGREES
EKG P-R INTERVAL: 150 MS
EKG P-R INTERVAL: 158 MS
EKG Q-T INTERVAL: 356 MS
EKG Q-T INTERVAL: 386 MS
EKG QRS DURATION: 90 MS
EKG QRS DURATION: 92 MS
EKG QTC CALCULATION (BAZETT): 437 MS
EKG QTC CALCULATION (BAZETT): 479 MS
EKG R AXIS: 44 DEGREES
EKG R AXIS: 71 DEGREES
EKG T AXIS: -21 DEGREES
EKG T AXIS: 49 DEGREES
EKG VENTRICULAR RATE: 91 BPM
EKG VENTRICULAR RATE: 93 BPM
EOSINOPHILS ABSOLUTE: 0.36 E9/L (ref 0.05–0.5)
EOSINOPHILS RELATIVE PERCENT: 3.5 % (ref 0–6)
GFR AFRICAN AMERICAN: 53
GFR NON-AFRICAN AMERICAN: 43 ML/MIN/1.73
GLUCOSE BLD-MCNC: 230 MG/DL (ref 74–99)
HAV IGM SER IA-ACNC: NORMAL
HBA1C MFR BLD: 7.7 % (ref 4–5.6)
HCT VFR BLD CALC: 22.5 % (ref 34–48)
HCT VFR BLD CALC: 23.2 % (ref 34–48)
HCT VFR BLD CALC: 23.3 % (ref 34–48)
HCT VFR BLD CALC: 25.6 % (ref 34–48)
HCT VFR BLD CALC: 26.2 % (ref 34–48)
HDLC SERPL-MCNC: 44 MG/DL
HEMOGLOBIN: 7.4 G/DL (ref 11.5–15.5)
HEMOGLOBIN: 7.6 G/DL (ref 11.5–15.5)
HEMOGLOBIN: 7.6 G/DL (ref 11.5–15.5)
HEMOGLOBIN: 8.2 G/DL (ref 11.5–15.5)
HEMOGLOBIN: 8.4 G/DL (ref 11.5–15.5)
HEPATITIS B CORE IGM ANTIBODY: NORMAL
HEPATITIS B SURFACE ANTIGEN INTERPRETATION: NORMAL
HEPATITIS C ANTIBODY INTERPRETATION: NORMAL
HUMAN METAPNEUMOVIRUS BY PCR: NOT DETECTED
HUMAN RHINOVIRUS/ENTEROVIRUS BY PCR: NOT DETECTED
IMMATURE GRANULOCYTES #: 0.04 E9/L
IMMATURE GRANULOCYTES %: 0.4 % (ref 0–5)
INFLUENZA A BY PCR: NOT DETECTED
INFLUENZA B BY PCR: NOT DETECTED
INR BLD: 0.9
LACTIC ACID: 1.4 MMOL/L (ref 0.5–2.2)
LDL CHOLESTEROL CALCULATED: 154 MG/DL (ref 0–99)
LV EF: 63 %
LVEF MODALITY: NORMAL
LYMPHOCYTES ABSOLUTE: 1.39 E9/L (ref 1.5–4)
LYMPHOCYTES RELATIVE PERCENT: 13.6 % (ref 20–42)
MAGNESIUM: 1.9 MG/DL (ref 1.6–2.6)
MCH RBC QN AUTO: 30.5 PG (ref 26–35)
MCHC RBC AUTO-ENTMCNC: 32.9 % (ref 32–34.5)
MCV RBC AUTO: 92.6 FL (ref 80–99.9)
MONOCYTES ABSOLUTE: 0.86 E9/L (ref 0.1–0.95)
MONOCYTES RELATIVE PERCENT: 8.4 % (ref 2–12)
MYCOPLASMA PNEUMONIAE BY PCR: NOT DETECTED
NEUTROPHILS ABSOLUTE: 7.53 E9/L (ref 1.8–7.3)
NEUTROPHILS RELATIVE PERCENT: 73.6 % (ref 43–80)
PARAINFLUENZA VIRUS 1 BY PCR: NOT DETECTED
PARAINFLUENZA VIRUS 2 BY PCR: NOT DETECTED
PARAINFLUENZA VIRUS 3 BY PCR: NOT DETECTED
PARAINFLUENZA VIRUS 4 BY PCR: NOT DETECTED
PDW BLD-RTO: 14.6 FL (ref 11.5–15)
PHOSPHORUS: 1.8 MG/DL (ref 2.5–4.5)
PLATELET # BLD: 264 E9/L (ref 130–450)
PMV BLD AUTO: 11.6 FL (ref 7–12)
POTASSIUM SERPL-SCNC: 2.5 MMOL/L (ref 3.5–5)
PROTHROMBIN TIME: 10.7 SEC (ref 9.3–12.4)
RBC # BLD: 2.43 E12/L (ref 3.5–5.5)
RESPIRATORY SYNCYTIAL VIRUS BY PCR: NOT DETECTED
SODIUM BLD-SCNC: 132 MMOL/L (ref 132–146)
TOTAL PROTEIN: 6.2 G/DL (ref 6.4–8.3)
TRIGL SERPL-MCNC: 263 MG/DL (ref 0–149)
TROPONIN: 0.24 NG/ML (ref 0–0.03)
TSH SERPL DL<=0.05 MIU/L-ACNC: 2.41 UIU/ML (ref 0.27–4.2)
VLDLC SERPL CALC-MCNC: 53 MG/DL
WBC # BLD: 10.2 E9/L (ref 4.5–11.5)

## 2020-04-10 PROCEDURE — 74181 MRI ABDOMEN W/O CONTRAST: CPT

## 2020-04-10 PROCEDURE — 83605 ASSAY OF LACTIC ACID: CPT

## 2020-04-10 PROCEDURE — 84100 ASSAY OF PHOSPHORUS: CPT

## 2020-04-10 PROCEDURE — 84484 ASSAY OF TROPONIN QUANT: CPT

## 2020-04-10 PROCEDURE — 2500000003 HC RX 250 WO HCPCS: Performed by: INTERNAL MEDICINE

## 2020-04-10 PROCEDURE — 85610 PROTHROMBIN TIME: CPT

## 2020-04-10 PROCEDURE — 97535 SELF CARE MNGMENT TRAINING: CPT

## 2020-04-10 PROCEDURE — 93306 TTE W/DOPPLER COMPLETE: CPT

## 2020-04-10 PROCEDURE — 84443 ASSAY THYROID STIM HORMONE: CPT

## 2020-04-10 PROCEDURE — 2580000003 HC RX 258: Performed by: INTERNAL MEDICINE

## 2020-04-10 PROCEDURE — 6370000000 HC RX 637 (ALT 250 FOR IP): Performed by: INTERNAL MEDICINE

## 2020-04-10 PROCEDURE — 85018 HEMOGLOBIN: CPT

## 2020-04-10 PROCEDURE — 97165 OT EVAL LOW COMPLEX 30 MIN: CPT

## 2020-04-10 PROCEDURE — 99232 SBSQ HOSP IP/OBS MODERATE 35: CPT | Performed by: INTERNAL MEDICINE

## 2020-04-10 PROCEDURE — 1200000000 HC SEMI PRIVATE

## 2020-04-10 PROCEDURE — 83735 ASSAY OF MAGNESIUM: CPT

## 2020-04-10 PROCEDURE — 97161 PT EVAL LOW COMPLEX 20 MIN: CPT | Performed by: PHYSICAL THERAPIST

## 2020-04-10 PROCEDURE — 36415 COLL VENOUS BLD VENIPUNCTURE: CPT

## 2020-04-10 PROCEDURE — APPSS45 APP SPLIT SHARED TIME 31-45 MINUTES: Performed by: PHYSICIAN ASSISTANT

## 2020-04-10 PROCEDURE — 83036 HEMOGLOBIN GLYCOSYLATED A1C: CPT

## 2020-04-10 PROCEDURE — 82378 CARCINOEMBRYONIC ANTIGEN: CPT

## 2020-04-10 PROCEDURE — 85025 COMPLETE CBC W/AUTO DIFF WBC: CPT

## 2020-04-10 PROCEDURE — 80061 LIPID PANEL: CPT

## 2020-04-10 PROCEDURE — 80053 COMPREHEN METABOLIC PANEL: CPT

## 2020-04-10 PROCEDURE — 85014 HEMATOCRIT: CPT

## 2020-04-10 PROCEDURE — 6360000002 HC RX W HCPCS: Performed by: INTERNAL MEDICINE

## 2020-04-10 PROCEDURE — 97530 THERAPEUTIC ACTIVITIES: CPT | Performed by: PHYSICAL THERAPIST

## 2020-04-10 RX ORDER — POTASSIUM BICARBONATE 25 MEQ/1
50 TABLET, EFFERVESCENT ORAL 2 TIMES DAILY
Status: DISCONTINUED | OUTPATIENT
Start: 2020-04-10 | End: 2020-04-10 | Stop reason: CLARIF

## 2020-04-10 RX ORDER — POTASSIUM CHLORIDE AND SODIUM CHLORIDE 900; 300 MG/100ML; MG/100ML
INJECTION, SOLUTION INTRAVENOUS CONTINUOUS
Status: DISCONTINUED | OUTPATIENT
Start: 2020-04-10 | End: 2020-04-13

## 2020-04-10 RX ADMIN — POTASSIUM BICARBONATE 40 MEQ: 782 TABLET, EFFERVESCENT ORAL at 09:49

## 2020-04-10 RX ADMIN — POTASSIUM BICARBONATE 40 MEQ: 782 TABLET, EFFERVESCENT ORAL at 20:49

## 2020-04-10 RX ADMIN — Medication 10 ML: at 09:50

## 2020-04-10 RX ADMIN — NITROGLYCERIN 0.5 INCH: 20 OINTMENT TOPICAL at 18:58

## 2020-04-10 RX ADMIN — DOXYCYCLINE 100 MG: 100 INJECTION, POWDER, LYOPHILIZED, FOR SOLUTION INTRAVENOUS at 18:58

## 2020-04-10 RX ADMIN — POTASSIUM CHLORIDE AND SODIUM CHLORIDE: 900; 300 INJECTION, SOLUTION INTRAVENOUS at 22:45

## 2020-04-10 RX ADMIN — DOXYCYCLINE 100 MG: 100 INJECTION, POWDER, LYOPHILIZED, FOR SOLUTION INTRAVENOUS at 08:05

## 2020-04-10 RX ADMIN — WATER 1 G: 1 INJECTION INTRAMUSCULAR; INTRAVENOUS; SUBCUTANEOUS at 08:04

## 2020-04-10 RX ADMIN — POTASSIUM CHLORIDE AND SODIUM CHLORIDE: 900; 300 INJECTION, SOLUTION INTRAVENOUS at 08:04

## 2020-04-10 RX ADMIN — NITROGLYCERIN 0.5 INCH: 20 OINTMENT TOPICAL at 13:23

## 2020-04-10 ASSESSMENT — PAIN SCALES - GENERAL
PAINLEVEL_OUTOF10: 0
PAINLEVEL_OUTOF10: 0

## 2020-04-10 NOTE — PROGRESS NOTES
follows 1 step directions. poor Problem solving skills   fair  Memory    fair  Sequencing  Communication: intact   Visual perceptual skills: impaired, per chart review, pt is blind in the R eye, macular degeneration      Glasses: no   Edema: no     Sensation: intact   Hand Dominance:  Left     X  Right     Left Right Comment   Passive range of motion [facility]     Active range of motion [facility]     Muscle Grade 4/5 4/5    /pinch Strength Intact  Intact     Additional Information: Good  and wfl FMC/dexterity noted during ADL tasks Good  and wfl FMC/dexterity noted during ADL tasks      Functional Assessment:   Initial Eval Status  Date: 4/10/2020 Treatment Status  Date: STGs = LTGs  Time frame: 5-7 days   Feeding Independent   Independent    Grooming Moderate Assist to don and doff shampoo cap, massage into scalp, towel dry, and comb hair  Independent    UB Dressing Moderate Assist to doff and don hospital gown   Independent    LB Dressing Maximal Assist to don incontinent garment   Independent    Bathing Maximal Assist  Modified Stella    Toileting Dependent for hygiene after having a BM   Modified Stella    Bed Mobility  Supine to sit: Moderate Assist   Scooting: Moderate Assist  Sit to supine: Moderate Assist   Supine to sit: Independent   Sit to supine: Independent    Functional Transfers Moderate Assist x 2 from EOB to wheeled walker x 2 reps  Independent    Functional Mobility Moderate Assist x 2 with wheeled walker, to side step towards head of bed (4 feet)  Modified Stella    Activity Tolerance Fair minus  Good      Balance:   Sitting: good   Standing: fair with wheeled walker    Endurance: fair minus      Comments: Upon arrival to the room the patient was side lying on her right. At end of the session, the patient was side lying on there right. Call light and phone within reach. All lines and tubes intact.   Overall patient demonstrated decreased independence and safety during Modifications []  Cognitive re-training []   Compensatory techniques for ADLs []  Splinting Needs []   Positioning to improve overall function [x]   Therapeutic Activity [x]  Therapeutic Exercise  []  Visual/Perceptual: []    Delirium prevention/treatment  []  Other:  []    Rehab Potential: Good for established goals developed with patient and family. Patient / Family Goal: return home      Patient and/or family were instructed on functional diagnosis, prognosis/goals and OT plan of care. Demonstrated fair understanding. Time In: 9:40am    Time Out: 10:02am                 Treatment Charges: Mins Units   ADL/Home Mgt                    [id number]    Therapeutic Activities          20540 3    Therapeutic Exercise          48044     Manual Therapy                  01055     Neuro Re-ed                       26726     Orthotic manage/training    64888     Total Timed Treatment 17 1     Evaluation time includes thorough review of current medical information, gathering information on past medical history/social history and prior level of function, completion of standardized testing/informal observation of tasks, assessment of data, and development of POC/Goals.     Lavell Golden OTR/L [id number]

## 2020-04-10 NOTE — PLAN OF CARE
Problem: Falls - Risk of:  Goal: Will remain free from falls  Description: Will remain free from falls  4/10/2020 1738 by Sy Sierra RN  Outcome: Met This Shift

## 2020-04-10 NOTE — CONSULTS
Pam Espinal      Patient Name: Tye Lamas  YOB: 1940  PCP: Marge Guy DO   Referring Provider:      Reason for Consultation:   Chief Complaint   Patient presents with    Fatigue    Cough    Fever        History of Present Illness: This pt is a pleasant 69 yo female who presented to the ER with decreased PO intake, cough/SOB and fever. She is confused on exam today and most history is taken from the EMR. In the ER, she was noted to have transaminitis and elevated T bili (mostly direct), TERRELL and leukocytosis with WBC 15.3, improved to 10.2 today and and anemia of 9.7 down to 7.4 today with normal platelet count. All 3 cell lines dropped from 4/9 to 4/10 suggesting a hemodilution. Troponin was significantly elevated and she was diagnosed with NSTEMI. Ferritin was extremely elevated at 1536 and serum iron studies were normal. CT imagign without contrast showed a 1.4 x 1.0 RLL cavitary nodule and multiple, <5 mm, scattered lung nodules. Intra and extrahepatic ductal dilation was also noted without definitive cause. The bile duct was cut off at the ampulla. MRCP of the abdomen is pending. Oncology has been consulted due to concern for neoplastic process. Mammogram 2/20 was negative.     Diagnostic Data:     Past Medical History:   Diagnosis Date    Anxiety     Asthma     Blind one eye     right eye    CAD (coronary artery disease)     Carpal tunnel syndrome, bilateral     COPD (chronic obstructive pulmonary disease) (HCC)     patient denies    Hypertension     IBS (irritable bowel syndrome)     patient denies having IBS    Macular degeneration bilateral    PONV (postoperative nausea and vomiting)     Type II or unspecified type diabetes mellitus without mention of complication, not stated as uncontrolled     UTI (urinary tract infection)     susceptible       Patient Active Problem List    Diagnosis Date Noted    intolerance. No excessive thirst, fluid intake, or urination. No tremor. Hematologic/Lymphatic: No abnormal bruising or bleeding, blood clots or swollen lymph nodes. Allergic/Immunologic: No nasal congestion or hives. Objective  /60   Pulse 90   Temp 98.1 °F (36.7 °C) (Oral)   Resp 18   Ht 4' 11\" (1.499 m)   Wt 146 lb 9.7 oz (66.5 kg)   SpO2 95%   BMI 29.61 kg/m²     Physical Exam:   Performance Status:  General: AAO to person, pleasant  Head and neck : PERRLA, EOMI . Scleral icterus +  Oropharynx : Clear  Neck: no JVD,  no adenopathy  LYMPHATICS : No LAD  Heart: Regular rate and regular rhythm, no murmur  Lungs: Clear to auscultation   Extremities: No edema,no cyanosis, no clubbing. Abdomen: Soft, non-tender;no masses, no organomegaly  Skin:  No rash  Neurologic:Cranial nerves grossly intact.  No focal motor or sensory deficits    Recent Laboratory Data-   Lab Results   Component Value Date    WBC 10.2 04/10/2020    HGB 7.4 (L) 04/10/2020    HCT 22.5 (L) 04/10/2020    MCV 92.6 04/10/2020     04/10/2020    LYMPHOPCT 13.6 (L) 04/10/2020    RBC 2.43 (L) 04/10/2020    MCH 30.5 04/10/2020    MCHC 32.9 04/10/2020    RDW 14.6 04/10/2020    NEUTOPHILPCT 73.6 04/10/2020    MONOPCT 8.4 04/10/2020    BASOPCT 0.5 04/10/2020    NEUTROABS 7.53 (H) 04/10/2020    LYMPHSABS 1.39 (L) 04/10/2020    MONOSABS 0.86 04/10/2020    EOSABS 0.36 04/10/2020    BASOSABS 0.05 04/10/2020       Lab Results   Component Value Date     04/10/2020    K 2.5 (LL) 04/10/2020     04/10/2020    CO2 18 (L) 04/10/2020    BUN 28 (H) 04/10/2020    CREATININE 1.2 (H) 04/10/2020    GLUCOSE 230 (H) 04/10/2020    CALCIUM 7.8 (L) 04/10/2020    PROT 6.2 (L) 04/10/2020    LABALBU 2.5 (L) 04/10/2020    BILITOT 2.7 (H) 04/10/2020    ALKPHOS 1,623 (H) 04/10/2020     (H) 04/10/2020     (H) 04/10/2020    LABGLOM 43 04/10/2020    GFRAA 53 04/10/2020       Lab Results   Component Value Date    IRON 107 04/09/2020

## 2020-04-10 NOTE — PROGRESS NOTES
Physical Therapy Initial Evaluation    Room #:  8124/5856-41  Patient Name: Neris Bush  YOB: 1940  MRN: [id number]    Referring Provider: Johnny Momin DO    Date of Service: 4/10/2020    Evaluating Physical Therapist:  Fiona Bliss, PT #[id number]      Diagnosis:   Acute respiratory failure with hypoxia ([address] [J96.01]  Sepsis ([address] [A41.9]        Patient Active Problem List   Diagnosis    Carpal tunnel syndrome    Sprain of hand    Sprain and strain of unspecified site of shoulder and upper arm    Contusion of forearm    Shoulder pain    Shoulder impingement    Sciatica    Lumbar spinal stenosis    DDD (degenerative disc disease), lumbar    Bulging lumbar disc    DJD (degenerative joint disease) of knee    Knee pain    Medial meniscus tear    Cubital tunnel syndrome of both upper extremities    Acute respiratory failure with hypoxia ([address]    Sepsis ([address]        Tentative placement recommendation: Subacute rehab    Equipment recommendation:  To be determined      Prior Level of Function: Patient ambulated with rollator    Rehab Potential: good  for baseline    Past medical history:   Past Medical History:   Diagnosis Date    Anxiety     Asthma     Blind one eye     right eye    CAD (coronary artery disease)     Carpal tunnel syndrome, bilateral     COPD (chronic obstructive pulmonary disease) ([address]     patient denies    Hypertension     IBS (irritable bowel syndrome)     patient denies having IBS    Macular degeneration bilateral    PONV (postoperative nausea and vomiting)     Type II or unspecified type diabetes mellitus without mention of complication, not stated as uncontrolled     UTI (urinary tract infection)     susceptible     Past Surgical History:   Procedure Laterality Date    ABDOMINAL AORTIC ANEURYSM REPAIR      APPENDECTOMY      CARDIAC SURGERY  1991    abdominal aortic bypass    CARPAL TUNNEL RELEASE  2005    right    CARPAL TUNNEL RELEASE  3/27/12 Therapeutic Exercises:  not performed      At end of session, patient in bed with 1:1 sitter present,  alarm call light and phone within reach,   all lines and tubes intact, nursing notified. Patient would benefit from continued skilled Physical Therapy to improve functional independence and quality of life. Patient's/ family goals   none stated        Patient and or family understand(s) diagnosis, prognosis, and plan of care. PLAN:    Physical Therapy care will be provided in accordance with the objectives noted above. Exercises and functional mobility practice will be used as well as appropriate assistive devices or modalities to obtain goals. Patient and family education will also be administered as needed. Frequency of treatments: Patient will be seen    daily  for therapeutic exercise, functional retraining, endurance activities, balance exercises, family and patient education. Time in  940  Time out  1002    Total Treatment Time  15 minutes    Evaluation time includes thorough review of current medical information, gathering information on past medical history/social history and prior level of function, completion of standardized testing/informal observation of tasks, assessment of data, and development of Plan of care and goals.      CPT codes:  Low Complexity PT evaluation (40016)  Therapeutic activities (60594)   13 minutes  1 unit(s)  Gait Training (42372) 2 minutes 0 unit(s)    Lauro Lemus, PT

## 2020-04-11 ENCOUNTER — ANESTHESIA EVENT (OUTPATIENT)
Dept: OPERATING ROOM | Age: 80
DRG: 444 | End: 2020-04-11
Payer: MEDICARE

## 2020-04-11 PROBLEM — K86.89 PANCREATIC MASS: Status: ACTIVE | Noted: 2020-04-11

## 2020-04-11 PROBLEM — K83.1 BILIARY OBSTRUCTION: Status: ACTIVE | Noted: 2020-04-11

## 2020-04-11 LAB
ABO/RH: NORMAL
AFP-TUMOR MARKER: 3 NG/ML (ref 0–9)
ALBUMIN SERPL-MCNC: 2.7 G/DL (ref 3.5–5.2)
ALP BLD-CCNC: 1528 U/L (ref 35–104)
ALT SERPL-CCNC: 178 U/L (ref 0–32)
ANION GAP SERPL CALCULATED.3IONS-SCNC: 13 MMOL/L (ref 7–16)
ANTIBODY SCREEN: NORMAL
AST SERPL-CCNC: 259 U/L (ref 0–31)
BASOPHILS ABSOLUTE: 0.04 E9/L (ref 0–0.2)
BASOPHILS RELATIVE PERCENT: 0.5 % (ref 0–2)
BILIRUB SERPL-MCNC: 2.4 MG/DL (ref 0–1.2)
BUN BLDV-MCNC: 20 MG/DL (ref 8–23)
CA 19-9: 502 U/ML (ref 0–37)
CALCIUM SERPL-MCNC: 7.9 MG/DL (ref 8.6–10.2)
CHLORIDE BLD-SCNC: 100 MMOL/L (ref 98–107)
CO2: 19 MMOL/L (ref 22–29)
CREAT SERPL-MCNC: 0.9 MG/DL (ref 0.5–1)
EOSINOPHILS ABSOLUTE: 0.27 E9/L (ref 0.05–0.5)
EOSINOPHILS RELATIVE PERCENT: 3.2 % (ref 0–6)
GFR AFRICAN AMERICAN: >60
GFR NON-AFRICAN AMERICAN: >60 ML/MIN/1.73
GLUCOSE BLD-MCNC: 258 MG/DL (ref 74–99)
HCT VFR BLD CALC: 23.3 % (ref 34–48)
HCT VFR BLD CALC: 24.5 % (ref 34–48)
HCT VFR BLD CALC: 25.8 % (ref 34–48)
HEMOGLOBIN: 7.6 G/DL (ref 11.5–15.5)
HEMOGLOBIN: 7.9 G/DL (ref 11.5–15.5)
HEMOGLOBIN: 8.2 G/DL (ref 11.5–15.5)
IMMATURE GRANULOCYTES #: 0.06 E9/L
IMMATURE GRANULOCYTES %: 0.7 % (ref 0–5)
INR BLD: 1
LYMPHOCYTES ABSOLUTE: 1.49 E9/L (ref 1.5–4)
LYMPHOCYTES RELATIVE PERCENT: 17.6 % (ref 20–42)
MCH RBC QN AUTO: 30.4 PG (ref 26–35)
MCHC RBC AUTO-ENTMCNC: 32.6 % (ref 32–34.5)
MCV RBC AUTO: 93.2 FL (ref 80–99.9)
MONOCYTES ABSOLUTE: 0.82 E9/L (ref 0.1–0.95)
MONOCYTES RELATIVE PERCENT: 9.7 % (ref 2–12)
NEUTROPHILS ABSOLUTE: 5.77 E9/L (ref 1.8–7.3)
NEUTROPHILS RELATIVE PERCENT: 68.3 % (ref 43–80)
PDW BLD-RTO: 14.7 FL (ref 11.5–15)
PLATELET # BLD: 234 E9/L (ref 130–450)
PMV BLD AUTO: 11.6 FL (ref 7–12)
POTASSIUM SERPL-SCNC: 4.3 MMOL/L (ref 3.5–5)
PROTHROMBIN TIME: 10.8 SEC (ref 9.3–12.4)
RBC # BLD: 2.5 E12/L (ref 3.5–5.5)
REASON FOR REJECTION: NORMAL
REJECTED TEST: NORMAL
SODIUM BLD-SCNC: 132 MMOL/L (ref 132–146)
TOTAL PROTEIN: 6 G/DL (ref 6.4–8.3)
URINE CULTURE, ROUTINE: NORMAL
WBC # BLD: 8.5 E9/L (ref 4.5–11.5)

## 2020-04-11 PROCEDURE — 6360000002 HC RX W HCPCS: Performed by: NURSE PRACTITIONER

## 2020-04-11 PROCEDURE — 6370000000 HC RX 637 (ALT 250 FOR IP): Performed by: INTERNAL MEDICINE

## 2020-04-11 PROCEDURE — 36415 COLL VENOUS BLD VENIPUNCTURE: CPT

## 2020-04-11 PROCEDURE — 80053 COMPREHEN METABOLIC PANEL: CPT

## 2020-04-11 PROCEDURE — 85025 COMPLETE CBC W/AUTO DIFF WBC: CPT

## 2020-04-11 PROCEDURE — 86850 RBC ANTIBODY SCREEN: CPT

## 2020-04-11 PROCEDURE — 85014 HEMATOCRIT: CPT

## 2020-04-11 PROCEDURE — 85610 PROTHROMBIN TIME: CPT

## 2020-04-11 PROCEDURE — 99222 1ST HOSP IP/OBS MODERATE 55: CPT | Performed by: TRANSPLANT SURGERY

## 2020-04-11 PROCEDURE — 2580000003 HC RX 258: Performed by: NURSE PRACTITIONER

## 2020-04-11 PROCEDURE — 86900 BLOOD TYPING SEROLOGIC ABO: CPT

## 2020-04-11 PROCEDURE — 2500000003 HC RX 250 WO HCPCS: Performed by: INTERNAL MEDICINE

## 2020-04-11 PROCEDURE — 99222 1ST HOSP IP/OBS MODERATE 55: CPT | Performed by: SURGERY

## 2020-04-11 PROCEDURE — G0328 FECAL BLOOD SCRN IMMUNOASSAY: HCPCS

## 2020-04-11 PROCEDURE — C9113 INJ PANTOPRAZOLE SODIUM, VIA: HCPCS | Performed by: NURSE PRACTITIONER

## 2020-04-11 PROCEDURE — 85018 HEMOGLOBIN: CPT

## 2020-04-11 PROCEDURE — 86923 COMPATIBILITY TEST ELECTRIC: CPT

## 2020-04-11 PROCEDURE — 6360000002 HC RX W HCPCS: Performed by: INTERNAL MEDICINE

## 2020-04-11 PROCEDURE — 86901 BLOOD TYPING SEROLOGIC RH(D): CPT

## 2020-04-11 PROCEDURE — 2580000003 HC RX 258: Performed by: INTERNAL MEDICINE

## 2020-04-11 PROCEDURE — 1200000000 HC SEMI PRIVATE

## 2020-04-11 RX ORDER — SODIUM CHLORIDE 9 MG/ML
10 INJECTION INTRAVENOUS 2 TIMES DAILY
Status: DISCONTINUED | OUTPATIENT
Start: 2020-04-11 | End: 2020-04-15 | Stop reason: HOSPADM

## 2020-04-11 RX ORDER — PANTOPRAZOLE SODIUM 40 MG/10ML
40 INJECTION, POWDER, LYOPHILIZED, FOR SOLUTION INTRAVENOUS 2 TIMES DAILY
Status: DISCONTINUED | OUTPATIENT
Start: 2020-04-11 | End: 2020-04-15 | Stop reason: HOSPADM

## 2020-04-11 RX ADMIN — NITROGLYCERIN 0.5 INCH: 20 OINTMENT TOPICAL at 00:57

## 2020-04-11 RX ADMIN — NITROGLYCERIN 0.5 INCH: 20 OINTMENT TOPICAL at 11:23

## 2020-04-11 RX ADMIN — POTASSIUM CHLORIDE AND SODIUM CHLORIDE: 900; 300 INJECTION, SOLUTION INTRAVENOUS at 14:16

## 2020-04-11 RX ADMIN — METOPROLOL TARTRATE 12.5 MG: 25 TABLET, FILM COATED ORAL at 09:26

## 2020-04-11 RX ADMIN — METOPROLOL TARTRATE 12.5 MG: 25 TABLET, FILM COATED ORAL at 20:41

## 2020-04-11 RX ADMIN — PANTOPRAZOLE SODIUM 40 MG: 40 INJECTION, POWDER, FOR SOLUTION INTRAVENOUS at 12:40

## 2020-04-11 RX ADMIN — Medication 10 ML: at 09:28

## 2020-04-11 RX ADMIN — NITROGLYCERIN 0.5 INCH: 20 OINTMENT TOPICAL at 06:55

## 2020-04-11 RX ADMIN — PANTOPRAZOLE SODIUM 40 MG: 40 INJECTION, POWDER, FOR SOLUTION INTRAVENOUS at 20:41

## 2020-04-11 RX ADMIN — DOXYCYCLINE 100 MG: 100 INJECTION, POWDER, LYOPHILIZED, FOR SOLUTION INTRAVENOUS at 06:50

## 2020-04-11 RX ADMIN — SODIUM CHLORIDE, PRESERVATIVE FREE 10 ML: 5 INJECTION INTRAVENOUS at 12:40

## 2020-04-11 RX ADMIN — SODIUM CHLORIDE, PRESERVATIVE FREE 10 ML: 5 INJECTION INTRAVENOUS at 20:41

## 2020-04-11 RX ADMIN — NITROGLYCERIN 0.5 INCH: 20 OINTMENT TOPICAL at 17:20

## 2020-04-11 RX ADMIN — WATER 1 G: 1 INJECTION INTRAMUSCULAR; INTRAVENOUS; SUBCUTANEOUS at 07:15

## 2020-04-11 ASSESSMENT — ENCOUNTER SYMPTOMS
BLOOD IN STOOL: 0
CONSTIPATION: 0
EYE PAIN: 0
NAUSEA: 0
EYE DISCHARGE: 0
ABDOMINAL PAIN: 0
SHORTNESS OF BREATH: 0
BACK PAIN: 1
PHOTOPHOBIA: 0
DIARRHEA: 0
VOMITING: 0

## 2020-04-11 ASSESSMENT — PAIN SCALES - GENERAL
PAINLEVEL_OUTOF10: 0
PAINLEVEL_OUTOF10: 0

## 2020-04-11 NOTE — ANESTHESIA PRE PROCEDURE
Department of Anesthesiology  Preprocedure Note       Name:  Tiarra Ramírez   Age:  70 y.o.  :  1940                                          MRN:  [id number]         Date:  2020      Surgeon: Lila Jhaveri):  Анна Burns MD    Procedure: ERCP ENDOSCOPIC RETROGRADE CHOLANGIOPANCREATOGRAPHY (N/A )    Medications prior to admission:   Prior to Admission medications    Medication Sig Start Date End Date Taking? Authorizing Provider   ibuprofen (ADVIL;MOTRIN) 200 MG tablet Take 400 mg by mouth every 6 hours as needed for Pain   Yes Historical Provider, MD   atorvastatin (LIPITOR) 80 MG tablet Take 80 mg by mouth nightly    Yes Historical Provider, MD   Doxylamine Succinate, Sleep, (SLEEP AID PO) Take 1 tablet by mouth nightly   Yes Historical Provider, MD   influenza virus trivalent vaccine (FLUZONE) injection Inject 0.5 mLs into the muscle once Given 2019   Yes Historical Provider, MD   Lutein 6 MG TABS Take 1 tablet by mouth 2 times daily    Yes Historical Provider, MD   Cholecalciferol (VITAMIN D3) 2000 UNITS TABS Take 1 tablet by mouth daily. Yes Historical Provider, MD   Multiple Vitamins-Minerals (VITEYES AREDS ADVANCED PO) Take 1 tablet by mouth 2 times daily.      Yes Historical Provider, MD   aspirin 81 MG EC tablet Take 81 mg by mouth daily    Yes Historical Provider, MD       Current medications:    Current Facility-Administered Medications   Medication Dose Route Frequency Provider Last Rate Last Dose    metoprolol tartrate (LOPRESSOR) tablet 12.5 mg  12.5 mg Oral BID Grace Nielsen, DO   12.5 mg at 20 0926    pantoprazole (PROTONIX) injection 40 mg  40 mg Intravenous BID Roseanna Avila, APRN - CNP   40 mg at 20 1240    And    sodium chloride (PF) 0.9 % injection 10 mL  10 mL Intravenous BID Roseanna Avila, APRN - CNP   10 mL at 20 1240    0.9% NaCl with KCl 40 mEq infusion   Intravenous Continuous Hernan Longoria DO 75 mL/hr at 20 1416      normal  Echocardiogram reviewed                  Neuro/Psych:   (+) neuromuscular disease:, depression/anxiety              ROS comment: Blind - right eye   Unable to hear - 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 Severe intrahepatic and extrahepatic bile duct dilatation is seen with  abrupt narrowing of the distal common bile duct, concerning for a  short-segment stricture or mass near the ampulla/pancreatic head.  Further  evaluation with ERCP is recommended. 2. Low-attenuation area is seen within the pancreatic head measuring 1.9 cm. Further evaluation with CT pancreatic mass protocol is recommended to exclude  a pancreatic head mass. 3. Minimal ascites is seen within the upper abdomen. .   Endo/Other:    (+) DiabetesType II DM, , blood dyscrasia (HGB 7.9): anemia:., .                 Abdominal:         (-) obese     Vascular: negative vascular ROS.  + PVD, aortic or cerebral (ABDOMINAL AORTIC ANEURYSM REPAIR), . Anesthesia Plan      general     ASA 3       Induction: intravenous. BIS  MIPS: Postoperative opioids intended and Prophylactic antiemetics administered. Anesthetic plan and risks discussed with patient (Patient unable to hear without hearing aids which were lost). Plan discussed with CRNA.               Joanne Rivera MD   4/11/2020

## 2020-04-11 NOTE — PLAN OF CARE
Problem: Falls - Risk of:  Goal: Will remain free from falls  Description: Will remain free from falls  4/11/2020 0456 by Gildardo Fagan RN  Outcome: Met This Shift  4/10/2020 1738 by Eusebia Crowley RN  Outcome: Met This Shift  Goal: Absence of physical injury  Description: Absence of physical injury  Outcome: Met This Shift     Problem: Gas Exchange - Impaired:  Goal: Levels of oxygenation will improve  Description: Levels of oxygenation will improve  Outcome: Met This Shift     Problem: Infection, Septic Shock:  Goal: Will show no infection signs and symptoms  Description: Will show no infection signs and symptoms  Outcome: Met This Shift     Problem: Venous Thromboembolism:  Goal: Will show no signs or symptoms of venous thromboembolism  Description: Will show no signs or symptoms of venous thromboembolism  Outcome: Met This Shift  Goal: Absence of signs or symptoms of impaired coagulation  Description: Absence of signs or symptoms of impaired coagulation  Outcome: Met This Shift     Problem: Discharge Planning:  Goal: Discharged to appropriate level of care  Description: Discharged to appropriate level of care  Outcome: Not Met This Shift  Note: Pt.  Not discharged yet

## 2020-04-11 NOTE — PROGRESS NOTES
Spoke to daughter today, she is faxing over paperwork that states she is the POA as well as her sister Piotr Martini. She is requesting Dr to call her today with question on Echo results & MRCP. # [id number]. Daughter inquiring about pt code status. Fernando Ruggiero stated being interested in DNR. I explained the POA could speak to the Dr about the request for code status change. Fernando Ruggiero stated it does need to be done now. I explained she is a full code at this time. Daughter stated 1 hearing aid was in pt ear upon arrival. Spoke to aid today, she stated hearing aide was in pt yesterday & not today & no where to be found. I told Rnee to call security per charge RN about report about hearing aide. 1103 called Cassy she is aware we have not received fax yet. It will be later today. Cassy stated it would be sent later on today. 1555 Received fax stating Janet Kidney daughters of pt are pt POA. Placed in chart. 1608 spoke to Dr Jose Mendez about POA inquiring about DNR. Dr stated will call POA later.  Daughter requesting to speak with

## 2020-04-11 NOTE — PROGRESS NOTES
%.    HEENT:    PERRLA. EOMI. Sclera is icteric. Bugle mucosa is moist.  Neck:    Supple. Trachea midline. No thyromegaly. No JVD. No bruits. Heart:    Rhythm regular, rate controlled. Mild systolic murmur. Lungs:    Symmetrical.  Relatively good aeration throughout. Mild wheezes noted in the posterior lung fields. .  Abdomen:   Soft. Mildly distended. Nontender to palpation. There is some voluntary guarding. No rebound tenderness. No peritoneal signs. Extremities:    Peripheral pulses present. No peripheral edema. No ulcers. Neurologic:    Alert x 3. No focal deficit. Cranial nerves grossly intact. I suspect a degree of dementia. Skin:    Diffuse jaundice is appreciated. Psych:   Behavior is normal. Mood appears normal. Speech is not rapid or pressured. She is somewhat agitated in the setting of dementia. Musculokeletal:  Spine ROM normal. Muscular strength intact. Gait not assessed.   Genitalia/Breast:  Deferred    Scheduled Meds:   nitroglycerin  0.5 inch Topical 4 times per day    sodium chloride flush  10 mL Intravenous 2 times per day    cefTRIAXone (ROCEPHIN) IV  1 g Intravenous Q24H    aspirin  324 mg Oral Once    doxycycline (VIBRAMYCIN) IV  100 mg Intravenous Q12H       Continuous Infusions:   0.9% NaCl with KCl 40 mEq 75 mL/hr at 04/10/20 2245       Objective Data:  CBC with Differential:    Lab Results   Component Value Date    WBC 8.5 04/11/2020    RBC 2.50 04/11/2020    HGB 7.6 04/11/2020    HCT 23.3 04/11/2020     04/11/2020    MCV 93.2 04/11/2020    MCH 30.4 04/11/2020    MCHC 32.6 04/11/2020    RDW 14.7 04/11/2020    LYMPHOPCT 17.6 04/11/2020    MONOPCT 9.7 04/11/2020    BASOPCT 0.5 04/11/2020    MONOSABS 0.82 04/11/2020    LYMPHSABS 1.49 04/11/2020    EOSABS 0.27 04/11/2020    BASOSABS 0.04 04/11/2020     CMP:    Lab Results   Component Value Date     04/11/2020    K 4.3 04/11/2020     04/11/2020    CO2 19 04/11/2020    BUN 20 04/11/2020    CREATININE family or surrogate(s) is included only if the patient was incapable of providing the necessary information or participating in medical decisions. I also discussed the differential diagnosis and all of the proposed management plans with the patient and individuals accompanying the patient. Iveth Cantu requires this high level of physician care and nursing on the IMC/Telemetry unit due the complexity of decision management and chance of rapid decline or death. Continued cardiac monitoring and higher level of nursing are required. I am readily available for any further decision-making and intervention.        Giuseppe Etienne DO, CAPRI.A.C.O.I.  4/11/2020  8:45 AM

## 2020-04-11 NOTE — PLAN OF CARE
Problem: Inadequate oral food/beverage intake (NI-2.1)  Goal: Food and/or Nutrient Delivery  Description: Start ONS BID  Outcome: Met This Shift

## 2020-04-11 NOTE — CONSULTS
Hepatobiliary and Pancreatic Surgery Attending History and Physical    Patient's Name/Date of Birth: Noel Royal /1940 (63 y.o.)    Date: April 11, 2020     CC: Dilated intra and extra hepatic ducts    HPI:  Patient is a very pleasant 70year old female whom presented to the hospital with decreased oral intake along with a fall. She also has been complaining of back pain in her mid back. She is hard of hearing and wears hearing aids. Her CEA is 11 and her Ca 19-9 is 500. Her imaging was performed without contrast  But does show at least a 2cm pancreatic head mass with extra hepatic and intra hepatic duct dilation.       Past Medical History:   Diagnosis Date    Anxiety     Asthma     Blind one eye     right eye    CAD (coronary artery disease)     Carpal tunnel syndrome, bilateral     COPD (chronic obstructive pulmonary disease) (HCC)     patient denies    Hypertension     IBS (irritable bowel syndrome)     patient denies having IBS    Macular degeneration bilateral    PONV (postoperative nausea and vomiting)     Type II or unspecified type diabetes mellitus without mention of complication, not stated as uncontrolled     UTI (urinary tract infection)     susceptible       Past Surgical History:   Procedure Laterality Date    ABDOMINAL AORTIC ANEURYSM REPAIR      APPENDECTOMY      CARDIAC SURGERY  1991    abdominal aortic bypass    CARPAL TUNNEL RELEASE  2005    right    CARPAL TUNNEL RELEASE  3/27/12    right    CARPAL TUNNEL RELEASE Left 07/01/2016    CERVICAL DISCECTOMY      CORONARY ARTERY BYPASS GRAFT      patient denies having heart surgery  9/9/15    EYE SURGERY Left     cataract extraction both eye    HYSTERECTOMY      LAPAROSCOPY      X6    NERVE BLOCK  11 30 2011    therapeutic caudal with epiduragram #1    TONSILLECTOMY AND ADENOIDECTOMY      UPPER GASTROINTESTINAL ENDOSCOPY  september 2015       Current Facility-Administered Medications   Medication Dose Route

## 2020-04-11 NOTE — PROGRESS NOTES
thyroid. Lungs/pleura: 1.4 x 1.0 cm right lower lobe nodule with cavity. No pleural effusion, pneumothorax, or evidence of pulmonary edema. There is no focal consolidation. 3 mm nodule right upper lobe (image 33). 3 mm left upper lobe nodule (image 33). 2 mm nodule in the left upper lobe (image 24). 4 mm left lower lobe nodule (image 79). Soft Tissues/Bones: No aggressive lytic or blastic bony lesion. Abdomen/Pelvis: Organs: Intra and extrahepatic ductal dilatation. Unenhanced spleen, pancreas, and adrenal glands are unremarkable. 2.1 cm right upper pole exophytic mass, likely a cyst based on its attenuation. Nonobstructing 1 mm right renal stone. No hydronephrosis or perinephric stranding. Exophytic hypodense left renal cortical mass, suspected to be a cyst but not well assessed on this noncontrast exam.  Bibasilar pulmonary atelectasis. GI/Bowel: No evidence of bowel obstruction or free intraperitoneal air. Broad-based umbilical hernia contains a portion of the transverse colon anterior wall. No associated evidence of obstruction or bowel wall thickening. Appendix is not seen. Pelvis: Copeland catheter decompresses the urinary bladder. Uterus is absent. No free fluid in the pelvis. Peritoneum/Retroperitoneum: Abdominal aorta is atherosclerotic. No retroperitoneal adenopathy. Bones/Soft Tissues: No aggressive lytic or blastic bony lesion. Generalized disc bulge at L3-L4, L4-L5, and L5-S1.     1. Right lower lobe 1.4 x 1.0 cm nodule with eccentric cavity. Differential includes malignancy. Recommend PET-CT and/or tissue diagnosis and on a nonemergent basis. 2. Intra and extrahepatic ductal dilatation. No convincing identifiable cause on this noncontrast exam; however, there is abrupt cutoff of the common bile duct distally, near the ampulla of Vater. Differential includes ampullary mass. Consider MRCP for further evaluation.  3. Broad-based anterior abdominal hernia contains the anterior wall of the consolidation. 3 mm nodule right upper lobe (image 33). 3 mm left upper lobe nodule (image 33). 2 mm nodule in the left upper lobe (image 24). 4 mm left lower lobe nodule (image 79). Soft Tissues/Bones: No aggressive lytic or blastic bony lesion. Abdomen/Pelvis: Organs: Intra and extrahepatic ductal dilatation. Unenhanced spleen, pancreas, and adrenal glands are unremarkable. 2.1 cm right upper pole exophytic mass, likely a cyst based on its attenuation. Nonobstructing 1 mm right renal stone. No hydronephrosis or perinephric stranding. Exophytic hypodense left renal cortical mass, suspected to be a cyst but not well assessed on this noncontrast exam.  Bibasilar pulmonary atelectasis. GI/Bowel: No evidence of bowel obstruction or free intraperitoneal air. Broad-based umbilical hernia contains a portion of the transverse colon anterior wall. No associated evidence of obstruction or bowel wall thickening. Appendix is not seen. Pelvis: Copeland catheter decompresses the urinary bladder. Uterus is absent. No free fluid in the pelvis. Peritoneum/Retroperitoneum: Abdominal aorta is atherosclerotic. No retroperitoneal adenopathy. Bones/Soft Tissues: No aggressive lytic or blastic bony lesion. Generalized disc bulge at L3-L4, L4-L5, and L5-S1.     1. Right lower lobe 1.4 x 1.0 cm nodule with eccentric cavity. Differential includes malignancy. Recommend PET-CT and/or tissue diagnosis and on a nonemergent basis. 2. Intra and extrahepatic ductal dilatation. No convincing identifiable cause on this noncontrast exam; however, there is abrupt cutoff of the common bile duct distally, near the ampulla of Vater. Differential includes ampullary mass. Consider MRCP for further evaluation. 3. Broad-based anterior abdominal hernia contains the anterior wall of the transverse colon. No evidence of obstruction or inflammation. 4. Atherosclerosis and coronary artery disease. 5. Hysterectomy.  6. Multiple sub 5 mm nodules as Severe intrahepatic and extrahepatic bile duct dilatation is seen with abrupt narrowing of the distal common bile duct, concerning for a short-segment stricture or mass near the ampulla/pancreatic head. Further evaluation with ERCP is recommended. 2. Low-attenuation area is seen within the pancreatic head measuring 1.9 cm. Further evaluation with CT pancreatic mass protocol is recommended to exclude a pancreatic head mass. 3. Minimal ascites is seen within the upper abdomen. Assessment and Plan  1. Dilated Intrahepatic and Extrahepatic Biliary  Ducts with concern for ampullary  mass   - MRI / MRCP showing severe intra-and extrahepatic bile duct dilation with narrowing of the CBD distally  -Concerning for stricture or mass near the ampulla/pancreatic head  -Also with low attenuation area within the pancreatic head measuring 1.9 cm  -Elevated  = 502  -Patient should have ERCP/EUS and possible FNA - nobody in our group has the ability for these procedures in conjunction  -Dr. Demetra Soliman for ERCP / EUS     2. Elevated LFTs  - Alkaline phosphatase 1980   - GGT elevated indication hepatic source of elevated Alk Phose   - RODNEY negative AMA negative AFP is pending   - MRCP pending  - MRI / MRCP showing severe intra-and extrahepatic bile duct dilation with narrowing of the CBD distally  -Concerning for stricture or mass near the ampulla/pancreatic head  -Also with low attenuation area within the pancreatic head measuring 1.9 cm  -Elevated  = 502  -Patient should have ERCP/EUS and possible FNA - nobody in our group has the ability for these procedures in conjunction\    3.  Normocytic Anemia   - VSS No overt GI Bleed on exam   - Decreased hgb from baseline, mild decrease since admission  - Folate and B12 within normal limits   - No iron deficiency  - Continue supportive care   - Okay for diet from GI POV   - Colonoscopy and EGD remote hx per daughter  - PPI BID  - FOBT pending  - Consider EGD - can likely be done

## 2020-04-11 NOTE — CONSULTS
patient denies having IBS    Macular degeneration bilateral    PONV (postoperative nausea and vomiting)     Type II or unspecified type diabetes mellitus without mention of complication, not stated as uncontrolled     UTI (urinary tract infection)     susceptible       Past Surgical History:   Procedure Laterality Date    ABDOMINAL AORTIC ANEURYSM REPAIR      APPENDECTOMY      CARDIAC SURGERY  1991    abdominal aortic bypass    CARPAL TUNNEL RELEASE  2005    right    CARPAL TUNNEL RELEASE  3/27/12    right    CARPAL TUNNEL RELEASE Left 07/01/2016    CERVICAL DISCECTOMY      CORONARY ARTERY BYPASS GRAFT      patient denies having heart surgery  9/9/15    EYE SURGERY Left     cataract extraction both eye    HYSTERECTOMY      LAPAROSCOPY      X6    NERVE BLOCK  11 30 2011    therapeutic caudal with epiduragram #1    TONSILLECTOMY AND ADENOIDECTOMY      UPPER GASTROINTESTINAL ENDOSCOPY  september 2015       Social History     Socioeconomic History    Marital status:      Spouse name: Not on file    Number of children: Not on file    Years of education: Not on file    Highest education level: Not on file   Occupational History    Not on file   Social Needs    Financial resource strain: Not on file    Food insecurity     Worry: Not on file     Inability: Not on file    Transportation needs     Medical: Not on file     Non-medical: Not on file   Tobacco Use    Smoking status: Former Smoker    Smokeless tobacco: Current User   Substance and Sexual Activity    Alcohol use: No    Drug use: No    Sexual activity: Not on file   Lifestyle    Physical activity     Days per week: Not on file     Minutes per session: Not on file    Stress: Not on file   Relationships    Social connections     Talks on phone: Not on file     Gets together: Not on file     Attends Sabianism service: Not on file     Active member of club or organization: Not on file     Attends meetings of clubs or organizations: Not on file     Relationship status: Not on file    Intimate partner violence     Fear of current or ex partner: Not on file     Emotionally abused: Not on file     Physically abused: Not on file     Forced sexual activity: Not on file   Other Topics Concern    Not on file   Social History Narrative    Not on file       The patient has a family history that is negative for severe cardiovascular or respiratory issues, negative for reaction to anesthesia. Recent Labs     04/09/20  0615  04/10/20  0529  04/10/20  2321 04/11/20  0436 04/11/20  1120   WBC 15.3*  --  10.2  --   --  8.5  --    HGB 9.7*   < > 7.4*   < > 8.4* 7.6* 7.9*   HCT 29.9*  29.6*   < > 22.5*   < > 26.2* 23.3* 24.5*   MCV 93.4  --  92.6  --   --  93.2  --      --  264  --   --  234  --     < > = values in this interval not displayed. Recent Labs     04/09/20  0615 04/10/20  0529 04/11/20  0436    132 132   K 3.6 2.5* 4.3   CO2 18* 18* 19*   PHOS  --  1.8*  --    BUN 35* 28* 20   CREATININE 1.4* 1.2* 0.9       Recent Labs     04/09/20  0615 04/10/20  0529 04/11/20  0436   PROT 8.1 6.2* 6.0*   INR 1.0 0.9 1.0         Intake/Output Summary (Last 24 hours) at 4/11/2020 1229  Last data filed at 4/11/2020 0840  Gross per 24 hour   Intake 1075 ml   Output 1700 ml   Net -625 ml       I have reviewed relevant labs from this admission and interpretation is included in my assessment and plan      Review of Systems  A complete 10 system review was performed and are otherwise negative unless mentioned in the above HPI. Specific negatives are listed below but may not include all those reviewed.     General ROS: negative obtundation, AMS  ENT ROS: negative rhinorrhea, epistaxis  Allergy and Immunology ROS: negative itchy/watery eyes or nasal congestion  Hematological and Lymphatic ROS: negative spontaneous bleeding or bruising  Endocrine ROS: negative  lethargy, mood swings, palpitations or polydipsia/polyuria  Respiratory

## 2020-04-12 ENCOUNTER — ANESTHESIA (OUTPATIENT)
Dept: OPERATING ROOM | Age: 80
DRG: 444 | End: 2020-04-12
Payer: MEDICARE

## 2020-04-12 ENCOUNTER — APPOINTMENT (OUTPATIENT)
Dept: GENERAL RADIOLOGY | Age: 80
DRG: 444 | End: 2020-04-12
Payer: MEDICARE

## 2020-04-12 VITALS
OXYGEN SATURATION: 98 % | RESPIRATION RATE: 1 BRPM | SYSTOLIC BLOOD PRESSURE: 145 MMHG | TEMPERATURE: 96.6 F | DIASTOLIC BLOOD PRESSURE: 124 MMHG

## 2020-04-12 LAB
ALBUMIN SERPL-MCNC: 2.5 G/DL (ref 3.5–5.2)
ALP BLD-CCNC: 1672 U/L (ref 35–104)
ALT SERPL-CCNC: 182 U/L (ref 0–32)
ANION GAP SERPL CALCULATED.3IONS-SCNC: 12 MMOL/L (ref 7–16)
AST SERPL-CCNC: 174 U/L (ref 0–31)
BASOPHILS ABSOLUTE: 0.04 E9/L (ref 0–0.2)
BASOPHILS RELATIVE PERCENT: 0.5 % (ref 0–2)
BILIRUB SERPL-MCNC: 2.4 MG/DL (ref 0–1.2)
BILIRUBIN DIRECT: 1.6 MG/DL (ref 0–0.3)
BILIRUBIN, INDIRECT: 0.8 MG/DL (ref 0–1)
BUN BLDV-MCNC: 12 MG/DL (ref 8–23)
CALCIUM SERPL-MCNC: 8.6 MG/DL (ref 8.6–10.2)
CHLORIDE BLD-SCNC: 102 MMOL/L (ref 98–107)
CO2: 19 MMOL/L (ref 22–29)
CREAT SERPL-MCNC: 0.9 MG/DL (ref 0.5–1)
EOSINOPHILS ABSOLUTE: 0.33 E9/L (ref 0.05–0.5)
EOSINOPHILS RELATIVE PERCENT: 3.9 % (ref 0–6)
GFR AFRICAN AMERICAN: >60
GFR NON-AFRICAN AMERICAN: >60 ML/MIN/1.73
GLUCOSE BLD-MCNC: 176 MG/DL (ref 74–99)
HCT VFR BLD CALC: 23.9 % (ref 34–48)
HCT VFR BLD CALC: 25 % (ref 34–48)
HCT VFR BLD CALC: 25.2 % (ref 34–48)
HCT VFR BLD CALC: 27.8 % (ref 34–48)
HCT VFR BLD CALC: 28.4 % (ref 34–48)
HEMOGLOBIN: 7.4 G/DL (ref 11.5–15.5)
HEMOGLOBIN: 7.9 G/DL (ref 11.5–15.5)
HEMOGLOBIN: 8.4 G/DL (ref 11.5–15.5)
HEMOGLOBIN: 8.7 G/DL (ref 11.5–15.5)
HEMOGLOBIN: 8.8 G/DL (ref 11.5–15.5)
IMMATURE GRANULOCYTES #: 0.11 E9/L
IMMATURE GRANULOCYTES %: 1.3 % (ref 0–5)
INR BLD: 1
LIPASE: 138 U/L (ref 13–60)
LYMPHOCYTES ABSOLUTE: 1.87 E9/L (ref 1.5–4)
LYMPHOCYTES RELATIVE PERCENT: 21.9 % (ref 20–42)
MCH RBC QN AUTO: 29.8 PG (ref 26–35)
MCHC RBC AUTO-ENTMCNC: 31 % (ref 32–34.5)
MCV RBC AUTO: 96.3 FL (ref 80–99.9)
MONOCYTES ABSOLUTE: 0.69 E9/L (ref 0.1–0.95)
MONOCYTES RELATIVE PERCENT: 8.1 % (ref 2–12)
NEUTROPHILS ABSOLUTE: 5.5 E9/L (ref 1.8–7.3)
NEUTROPHILS RELATIVE PERCENT: 64.3 % (ref 43–80)
OCCULT BLOOD SCREENING: NORMAL
PDW BLD-RTO: 14.8 FL (ref 11.5–15)
PLATELET # BLD: 247 E9/L (ref 130–450)
PMV BLD AUTO: 11.5 FL (ref 7–12)
POTASSIUM SERPL-SCNC: 4.8 MMOL/L (ref 3.5–5)
PROTHROMBIN TIME: 12 SEC (ref 9.3–12.4)
RBC # BLD: 2.95 E12/L (ref 3.5–5.5)
SODIUM BLD-SCNC: 133 MMOL/L (ref 132–146)
TOTAL PROTEIN: 6.6 G/DL (ref 6.4–8.3)
WBC # BLD: 8.5 E9/L (ref 4.5–11.5)

## 2020-04-12 PROCEDURE — 6370000000 HC RX 637 (ALT 250 FOR IP): Performed by: SURGERY

## 2020-04-12 PROCEDURE — C1769 GUIDE WIRE: HCPCS | Performed by: SURGERY

## 2020-04-12 PROCEDURE — 0FD98ZX EXTRACTION OF COMMON BILE DUCT, VIA NATURAL OR ARTIFICIAL OPENING ENDOSCOPIC, DIAGNOSTIC: ICD-10-PCS | Performed by: SURGERY

## 2020-04-12 PROCEDURE — 2580000003 HC RX 258: Performed by: SURGERY

## 2020-04-12 PROCEDURE — 2500000003 HC RX 250 WO HCPCS: Performed by: NURSE ANESTHETIST, CERTIFIED REGISTERED

## 2020-04-12 PROCEDURE — 1200000000 HC SEMI PRIVATE

## 2020-04-12 PROCEDURE — 6360000002 HC RX W HCPCS: Performed by: NURSE ANESTHETIST, CERTIFIED REGISTERED

## 2020-04-12 PROCEDURE — 0F798DZ DILATION OF COMMON BILE DUCT WITH INTRALUMINAL DEVICE, VIA NATURAL OR ARTIFICIAL OPENING ENDOSCOPIC: ICD-10-PCS | Performed by: SURGERY

## 2020-04-12 PROCEDURE — 82248 BILIRUBIN DIRECT: CPT

## 2020-04-12 PROCEDURE — 2720000010 HC SURG SUPPLY STERILE: Performed by: SURGERY

## 2020-04-12 PROCEDURE — 43261 ENDO CHOLANGIOPANCREATOGRAPH: CPT | Performed by: SURGERY

## 2020-04-12 PROCEDURE — 6360000002 HC RX W HCPCS: Performed by: SURGERY

## 2020-04-12 PROCEDURE — 88305 TISSUE EXAM BY PATHOLOGIST: CPT

## 2020-04-12 PROCEDURE — 6360000002 HC RX W HCPCS: Performed by: INTERNAL MEDICINE

## 2020-04-12 PROCEDURE — 43274 ERCP DUCT STENT PLACEMENT: CPT | Performed by: SURGERY

## 2020-04-12 PROCEDURE — 6360000004 HC RX CONTRAST MEDICATION: Performed by: SURGERY

## 2020-04-12 PROCEDURE — 2580000003 HC RX 258: Performed by: NURSE ANESTHETIST, CERTIFIED REGISTERED

## 2020-04-12 PROCEDURE — 88112 CYTOPATH CELL ENHANCE TECH: CPT

## 2020-04-12 PROCEDURE — 85025 COMPLETE CBC W/AUTO DIFF WBC: CPT

## 2020-04-12 PROCEDURE — 85018 HEMOGLOBIN: CPT

## 2020-04-12 PROCEDURE — C2625 STENT, NON-COR, TEM W/DEL SY: HCPCS | Performed by: SURGERY

## 2020-04-12 PROCEDURE — 74330 X-RAY BILE/PANC ENDOSCOPY: CPT

## 2020-04-12 PROCEDURE — 2709999900 HC NON-CHARGEABLE SUPPLY: Performed by: SURGERY

## 2020-04-12 PROCEDURE — 3700000001 HC ADD 15 MINUTES (ANESTHESIA): Performed by: SURGERY

## 2020-04-12 PROCEDURE — 85014 HEMATOCRIT: CPT

## 2020-04-12 PROCEDURE — C9113 INJ PANTOPRAZOLE SODIUM, VIA: HCPCS | Performed by: SURGERY

## 2020-04-12 PROCEDURE — 3700000000 HC ANESTHESIA ATTENDED CARE: Performed by: SURGERY

## 2020-04-12 PROCEDURE — 7100000001 HC PACU RECOVERY - ADDTL 15 MIN: Performed by: SURGERY

## 2020-04-12 PROCEDURE — 3600007503: Performed by: SURGERY

## 2020-04-12 PROCEDURE — 7100000000 HC PACU RECOVERY - FIRST 15 MIN: Performed by: SURGERY

## 2020-04-12 PROCEDURE — 3600007513: Performed by: SURGERY

## 2020-04-12 PROCEDURE — 85610 PROTHROMBIN TIME: CPT

## 2020-04-12 PROCEDURE — 83690 ASSAY OF LIPASE: CPT

## 2020-04-12 PROCEDURE — 6370000000 HC RX 637 (ALT 250 FOR IP): Performed by: INTERNAL MEDICINE

## 2020-04-12 PROCEDURE — 80053 COMPREHEN METABOLIC PANEL: CPT

## 2020-04-12 PROCEDURE — 36415 COLL VENOUS BLD VENIPUNCTURE: CPT

## 2020-04-12 DEVICE — BILIARY STENT WITH NAVIFLEXTM RX DELIVERY SYSTEM
Type: IMPLANTABLE DEVICE | Status: FUNCTIONAL
Brand: ADVANIX™ BILIARY

## 2020-04-12 RX ORDER — ROCURONIUM BROMIDE 10 MG/ML
INJECTION, SOLUTION INTRAVENOUS PRN
Status: DISCONTINUED | OUTPATIENT
Start: 2020-04-12 | End: 2020-04-12 | Stop reason: SDUPTHER

## 2020-04-12 RX ORDER — FENTANYL CITRATE 50 UG/ML
INJECTION, SOLUTION INTRAMUSCULAR; INTRAVENOUS PRN
Status: DISCONTINUED | OUTPATIENT
Start: 2020-04-12 | End: 2020-04-12 | Stop reason: SDUPTHER

## 2020-04-12 RX ORDER — ONDANSETRON 2 MG/ML
4 INJECTION INTRAMUSCULAR; INTRAVENOUS
Status: DISCONTINUED | OUTPATIENT
Start: 2020-04-12 | End: 2020-04-12 | Stop reason: HOSPADM

## 2020-04-12 RX ORDER — LIDOCAINE HYDROCHLORIDE 20 MG/ML
INJECTION, SOLUTION INTRAVENOUS PRN
Status: DISCONTINUED | OUTPATIENT
Start: 2020-04-12 | End: 2020-04-12 | Stop reason: SDUPTHER

## 2020-04-12 RX ORDER — SODIUM CHLORIDE 9 MG/ML
INJECTION, SOLUTION INTRAVENOUS CONTINUOUS PRN
Status: DISCONTINUED | OUTPATIENT
Start: 2020-04-12 | End: 2020-04-12 | Stop reason: SDUPTHER

## 2020-04-12 RX ORDER — NEOSTIGMINE METHYLSULFATE 1 MG/ML
INJECTION, SOLUTION INTRAVENOUS PRN
Status: DISCONTINUED | OUTPATIENT
Start: 2020-04-12 | End: 2020-04-12 | Stop reason: SDUPTHER

## 2020-04-12 RX ORDER — ONDANSETRON 2 MG/ML
INJECTION INTRAMUSCULAR; INTRAVENOUS PRN
Status: DISCONTINUED | OUTPATIENT
Start: 2020-04-12 | End: 2020-04-12 | Stop reason: SDUPTHER

## 2020-04-12 RX ORDER — MEPERIDINE HYDROCHLORIDE 25 MG/ML
12.5 INJECTION INTRAMUSCULAR; INTRAVENOUS; SUBCUTANEOUS EVERY 5 MIN PRN
Status: DISCONTINUED | OUTPATIENT
Start: 2020-04-12 | End: 2020-04-12 | Stop reason: HOSPADM

## 2020-04-12 RX ORDER — DEXAMETHASONE SODIUM PHOSPHATE 10 MG/ML
INJECTION, SOLUTION INTRAMUSCULAR; INTRAVENOUS PRN
Status: DISCONTINUED | OUTPATIENT
Start: 2020-04-12 | End: 2020-04-12 | Stop reason: SDUPTHER

## 2020-04-12 RX ORDER — FENTANYL CITRATE 50 UG/ML
50 INJECTION, SOLUTION INTRAMUSCULAR; INTRAVENOUS EVERY 5 MIN PRN
Status: DISCONTINUED | OUTPATIENT
Start: 2020-04-12 | End: 2020-04-12 | Stop reason: HOSPADM

## 2020-04-12 RX ORDER — GLYCOPYRROLATE 1 MG/5 ML
SYRINGE (ML) INTRAVENOUS PRN
Status: DISCONTINUED | OUTPATIENT
Start: 2020-04-12 | End: 2020-04-12 | Stop reason: SDUPTHER

## 2020-04-12 RX ORDER — FENTANYL CITRATE 50 UG/ML
25 INJECTION, SOLUTION INTRAMUSCULAR; INTRAVENOUS EVERY 5 MIN PRN
Status: DISCONTINUED | OUTPATIENT
Start: 2020-04-12 | End: 2020-04-12 | Stop reason: HOSPADM

## 2020-04-12 RX ORDER — PROPOFOL 10 MG/ML
INJECTION, EMULSION INTRAVENOUS PRN
Status: DISCONTINUED | OUTPATIENT
Start: 2020-04-12 | End: 2020-04-12 | Stop reason: SDUPTHER

## 2020-04-12 RX ADMIN — FENTANYL CITRATE 50 MCG: 50 INJECTION, SOLUTION INTRAMUSCULAR; INTRAVENOUS at 09:23

## 2020-04-12 RX ADMIN — SODIUM CHLORIDE, PRESERVATIVE FREE 10 ML: 5 INJECTION INTRAVENOUS at 20:24

## 2020-04-12 RX ADMIN — Medication 10 ML: at 13:19

## 2020-04-12 RX ADMIN — GLUCAGON HYDROCHLORIDE 1 MG: KIT at 09:06

## 2020-04-12 RX ADMIN — PANTOPRAZOLE SODIUM 40 MG: 40 INJECTION, POWDER, FOR SOLUTION INTRAVENOUS at 20:24

## 2020-04-12 RX ADMIN — METOPROLOL TARTRATE 12.5 MG: 25 TABLET, FILM COATED ORAL at 13:45

## 2020-04-12 RX ADMIN — SODIUM CHLORIDE, PRESERVATIVE FREE 10 ML: 5 INJECTION INTRAVENOUS at 13:18

## 2020-04-12 RX ADMIN — NITROGLYCERIN 0.5 INCH: 20 OINTMENT TOPICAL at 13:23

## 2020-04-12 RX ADMIN — DEXAMETHASONE SODIUM PHOSPHATE 10 MG: 10 INJECTION, SOLUTION INTRAMUSCULAR; INTRAVENOUS at 08:18

## 2020-04-12 RX ADMIN — FENTANYL CITRATE 100 MCG: 50 INJECTION, SOLUTION INTRAMUSCULAR; INTRAVENOUS at 08:18

## 2020-04-12 RX ADMIN — ONDANSETRON 4 MG: 2 INJECTION INTRAMUSCULAR; INTRAVENOUS at 08:18

## 2020-04-12 RX ADMIN — POTASSIUM CHLORIDE AND SODIUM CHLORIDE: 900; 300 INJECTION, SOLUTION INTRAVENOUS at 21:43

## 2020-04-12 RX ADMIN — SODIUM CHLORIDE: 9 INJECTION, SOLUTION INTRAVENOUS at 08:08

## 2020-04-12 RX ADMIN — NITROGLYCERIN 0.5 INCH: 20 OINTMENT TOPICAL at 06:01

## 2020-04-12 RX ADMIN — PHENYLEPHRINE HYDROCHLORIDE 100 MCG: 10 INJECTION INTRAVENOUS at 08:38

## 2020-04-12 RX ADMIN — Medication 0.4 MG: at 09:38

## 2020-04-12 RX ADMIN — ROCURONIUM BROMIDE 40 MG: 10 SOLUTION INTRAVENOUS at 08:18

## 2020-04-12 RX ADMIN — PANTOPRAZOLE SODIUM 40 MG: 40 INJECTION, POWDER, FOR SOLUTION INTRAVENOUS at 13:18

## 2020-04-12 RX ADMIN — Medication 3 MG: at 09:38

## 2020-04-12 RX ADMIN — NITROGLYCERIN 0.5 INCH: 20 OINTMENT TOPICAL at 23:56

## 2020-04-12 RX ADMIN — GLUCAGON HYDROCHLORIDE 1 MG: KIT at 08:43

## 2020-04-12 RX ADMIN — LIDOCAINE HYDROCHLORIDE 60 MG: 20 INJECTION, SOLUTION INTRAVENOUS at 08:18

## 2020-04-12 RX ADMIN — NITROGLYCERIN 0.5 INCH: 20 OINTMENT TOPICAL at 00:09

## 2020-04-12 RX ADMIN — SODIUM CHLORIDE: 9 INJECTION, SOLUTION INTRAVENOUS at 09:40

## 2020-04-12 RX ADMIN — NITROGLYCERIN 0.5 INCH: 20 OINTMENT TOPICAL at 18:02

## 2020-04-12 RX ADMIN — POTASSIUM CHLORIDE AND SODIUM CHLORIDE: 900; 300 INJECTION, SOLUTION INTRAVENOUS at 03:51

## 2020-04-12 RX ADMIN — METOPROLOL TARTRATE 12.5 MG: 25 TABLET, FILM COATED ORAL at 20:24

## 2020-04-12 RX ADMIN — PROPOFOL 160 MG: 10 INJECTION, EMULSION INTRAVENOUS at 08:18

## 2020-04-12 ASSESSMENT — PULMONARY FUNCTION TESTS
PIF_VALUE: 26
PIF_VALUE: 3
PIF_VALUE: 22
PIF_VALUE: 27
PIF_VALUE: 28
PIF_VALUE: 28
PIF_VALUE: 20
PIF_VALUE: 27
PIF_VALUE: 20
PIF_VALUE: 20
PIF_VALUE: 28
PIF_VALUE: 28
PIF_VALUE: 29
PIF_VALUE: 30
PIF_VALUE: 29
PIF_VALUE: 29
PIF_VALUE: 24
PIF_VALUE: 2
PIF_VALUE: 30
PIF_VALUE: 26
PIF_VALUE: 26
PIF_VALUE: 27
PIF_VALUE: 27
PIF_VALUE: 2
PIF_VALUE: 28
PIF_VALUE: 28
PIF_VALUE: 31
PIF_VALUE: 3
PIF_VALUE: 28
PIF_VALUE: 28
PIF_VALUE: 20
PIF_VALUE: 28
PIF_VALUE: 25
PIF_VALUE: 29
PIF_VALUE: 26
PIF_VALUE: 28
PIF_VALUE: 27
PIF_VALUE: 20
PIF_VALUE: 21
PIF_VALUE: 28
PIF_VALUE: 21
PIF_VALUE: 26
PIF_VALUE: 0
PIF_VALUE: 27
PIF_VALUE: 30
PIF_VALUE: 29
PIF_VALUE: 27
PIF_VALUE: 28
PIF_VALUE: 29
PIF_VALUE: 28
PIF_VALUE: 2
PIF_VALUE: 20
PIF_VALUE: 20
PIF_VALUE: 3
PIF_VALUE: 27
PIF_VALUE: 24
PIF_VALUE: 29
PIF_VALUE: 4
PIF_VALUE: 28
PIF_VALUE: 23
PIF_VALUE: 29
PIF_VALUE: 28
PIF_VALUE: 29
PIF_VALUE: 4
PIF_VALUE: 20
PIF_VALUE: 34
PIF_VALUE: 28
PIF_VALUE: 28
PIF_VALUE: 29
PIF_VALUE: 26
PIF_VALUE: 28
PIF_VALUE: 29
PIF_VALUE: 26
PIF_VALUE: 3
PIF_VALUE: 26
PIF_VALUE: 28
PIF_VALUE: 29
PIF_VALUE: 26
PIF_VALUE: 28
PIF_VALUE: 22
PIF_VALUE: 22
PIF_VALUE: 28
PIF_VALUE: 23
PIF_VALUE: 26
PIF_VALUE: 18
PIF_VALUE: 26

## 2020-04-12 ASSESSMENT — PAIN SCALES - GENERAL
PAINLEVEL_OUTOF10: 0

## 2020-04-12 NOTE — ANESTHESIA POSTPROCEDURE EVALUATION
Department of Anesthesiology  Postprocedure Note    Patient: Aziza Sarmiento  MRN: [id number]  YOB: 1940  Date of evaluation: 4/12/2020  Time:  2:02 PM     Procedure Summary     Date:  04/12/20 Room / Location:  [address] [place] [address] 644 / 4199 [facility]    Anesthesia Start:  9115 Anesthesia Stop:  5910    Procedures:       ERCP SPHINCTER/PAPILLOTOMY (N/A )      ERCP STENT INSERTION (N/A )      ERCP DIAGNOSTIC WITH BRUSHINGS (N/A ) Diagnosis:  (obst bile duct stricture)    Surgeon:  Chelsy Wade MD Responsible Provider:  John Hameed MD    Anesthesia Type:  general ASA Status:  3          Anesthesia Type: general    Derek Phase I: Derek Score: 9    Derek Phase II:      Last vitals: Reviewed and per EMR flowsheets.        Anesthesia Post Evaluation    Patient location during evaluation: PACU  Patient participation: complete - patient participated  Level of consciousness: awake and alert  Airway patency: patent  Nausea & Vomiting: no vomiting and no nausea  Complications: no  Cardiovascular status: hemodynamically stable  Respiratory status: acceptable  Hydration status: stable

## 2020-04-12 NOTE — PROGRESS NOTES
was utilized to reduce the radiation dose to as low as reasonably achievable. COMPARISON: CT abdomen and pelvis dated April 29, 2016. No comparison for the chest portion of the exam. HISTORY: ORDERING SYSTEM PROVIDED HISTORY: Evaluate for ground- PROVIDED HISTORY: Reason for exam:-> Evaluate for ground-glass ORDERING SYSTEM PROVIDED HISTORY: Evaluate pancreas TECHNOLOGIST PROVIDED HISTORY: Reason for exam:-> Evaluate pancreas Additional Contrast?->None Cough, fever, and abdominal pain. Jaundice. Initial encounter. FINDINGS: Chest: Mediastinum: Heart size is normal.  No pericardial effusion. Coronary artery atherosclerosis. Thoracic aorta is atherosclerotic. Within the limitations of a noncontrast exam, there is no evidence of hilar adenopathy. No mediastinal adenopathy. Rim calcifications in the right lobe of the thyroid. Lungs/pleura: 1.4 x 1.0 cm right lower lobe nodule with cavity. No pleural effusion, pneumothorax, or evidence of pulmonary edema. There is no focal consolidation. 3 mm nodule right upper lobe (image 33). 3 mm left upper lobe nodule (image 33). 2 mm nodule in the left upper lobe (image 24). 4 mm left lower lobe nodule (image 79). Soft Tissues/Bones: No aggressive lytic or blastic bony lesion. Abdomen/Pelvis: Organs: Intra and extrahepatic ductal dilatation. Unenhanced spleen, pancreas, and adrenal glands are unremarkable. 2.1 cm right upper pole exophytic mass, likely a cyst based on its attenuation. Nonobstructing 1 mm right renal stone. No hydronephrosis or perinephric stranding. Exophytic hypodense left renal cortical mass, suspected to be a cyst but not well assessed on this noncontrast exam.  Bibasilar pulmonary atelectasis. GI/Bowel: No evidence of bowel obstruction or free intraperitoneal air. Broad-based umbilical hernia contains a portion of the transverse colon anterior wall. No associated evidence of obstruction or bowel wall thickening.   Appendix is not slab, thin slab and 3D coronal MRCP sequences were obtained without the administration of intravenous contrast.  MIP images are provided for review. COMPARISON: None HISTORY: ORDERING SYSTEM PROVIDED HISTORY: Intra and extrahepatic ductal dilatation TECHNOLOGIST PROVIDED HISTORY: Reason for exam:->Intra and extrahepatic ductal dilatation FINDINGS: Gallbladder: The gallbladder is surgically absent. Bile Ducts: There is severe intrahepatic and extrahepatic bile duct dilatation. The distal common bile duct measures 1.4 cm. There is abrupt narrowing of the distal common bile duct, concerning for stricture or mass near the ampulla/pancreatic head. There is a focal low-attenuation area in the pancreatic head which measures 1.5 x 1.9 cm, concerning for pancreatic mass. Further evaluation with CT pancreas mass protocol is recommended. Pancreatic Duct: The pancreatic duct is not dilated. Other:  Minimal perihepatic fluid is seen. Minimal ascites is seen within the left upper quadrant. 1. Severe intrahepatic and extrahepatic bile duct dilatation is seen with abrupt narrowing of the distal common bile duct, concerning for a short-segment stricture or mass near the ampulla/pancreatic head. Further evaluation with ERCP is recommended. 2. Low-attenuation area is seen within the pancreatic head measuring 1.9 cm. Further evaluation with CT pancreatic mass protocol is recommended to exclude a pancreatic head mass. 3. Minimal ascites is seen within the upper abdomen. Assessment and Plan  1. Dilated Intrahepatic and Extrahepatic Biliary  Ducts with concern for ampullary  mass   - MRI / MRCP showing severe intra-and extrahepatic bile duct dilation with narrowing of the CBD distally  -Concerning for stricture or mass near the ampulla/pancreatic head  -Also with low attenuation area within the pancreatic head measuring 1.9 cm  -Elevated  = 502  -Patient had ERCP and note reviewed. PT for possible EUS.   Dr. Lupillo Sarabia and Ashleyella following.     2. Elevated LFTs  - Alkaline phosphatase 1672  - GGT elevated indication hepatic source of elevated Alk Phose   - RODNEY negative AMA negative AFP is 3   - MRI / MRCP showing severe intra-and extrahepatic bile duct dilation with narrowing of the CBD distally  -Concerning for stricture or mass near the ampulla/pancreatic head  -Also with low attenuation area within the pancreatic head measuring 1.9 cm  -Elevated  = 502  -Recommend EUS and possible FNA -     3. Normocytic Anemia   - VSS No overt GI Bleed on exam   - Decreased hgb from baseline, mild decrease since admission  - Folate and B12 within normal limits   - No iron deficiency  - Continue supportive care   - Okay for diet from GI POV   - Colonoscopy and EGD remote hx per daughter  - PPI BID  - FOBT pending  - Consider EGD - can likely be done at the time of ERCP / EUS - defer to Dr. Chacho Hamilton     4. Right Lower Lobe Nodule   - Pt continued to smoke   - concern for possible malignancy  - per admitting      5. Acute Kidney Injury  - per admitting       Pt has FOBT +. .  Continue to follow serial Hg and have 2 units of PRBC on hold. Protoinx BID. Remote hx of colonoscopy which may need to be considered if bleeding occurs or further drop in Hg, will consider doing this week pending EUS consideration. Our service will follow.     DO Chi  4/12/2020  3:07 PM

## 2020-04-12 NOTE — OP NOTE
brought to the operating room and she underwent General anesthesia by the anesthesia team. She was then placed in the  left lateral decubitus position. The flexible duodenoscope was inserted down the oropharynx and passed down the esophagus into the stomach and into the  duodenum. The papilla was identified. It was a hooded papilla with difficulty accessing the opening. Multiple attempts were made at cannulating the common bile duct however these were unsuccessful within normal papillotome. Vision was then made to use a needle knife sphincterotome. Precut sphincterotomy was performed with a needle knife. Multiple other attempts were made with the sphincterotome without any success. I then decided to use a 0.025 stiff wire sphincterotome to attempt cannulation of the common bile duct. This was finally successful. A wire was passed and initial contrast injection confirm adequate cannulation of the CBD. Common bile duct was dilated to 16 mm and the intrahepatic bile ducts were dilated as well. There were no filling defects in the common bile duct. The sphincterotomy was extended with sphincterotome. Multiple sweeps with a 12 to 15 mm  Balloon were performed. There was no debris in the common bile duct, however there was a stricture in the distal common bile duct which may have precluded any passage of debris. A push cholangiogram was obtained which again showed a large common bile duct as well as distal common bile duct stricture. A cytology brush was used to obtain brushings at the level of the distal common bile duct. In light of the patient needing a an endoscopic ultrasound for possible biopsy of the pancreatic head, decision was made to place a plastic stent at this time. A 10 English by 7 cm plastic stent was deployed across the ampulla. There was good bile flow and the stent was in good position endoscopically and fluoroscopically. The scope was then withdrawn.  The patient tolerated the procedure well.       PLAN: EUS         Electronically signed by Jocelin Locke MD on 4/12/2020 at 9:57 AM

## 2020-04-12 NOTE — PROGRESS NOTES
complexity of decision management and chance of rapid decline or death. Continued cardiac monitoring and higher level of nursing are required. I am readily available for any further decision-making and intervention.        Juan Esposito DO, RACHEL.C.O.I.  4/12/2020  8:55 AM

## 2020-04-13 PROBLEM — C25.9 PANCREATIC CANCER (HCC): Status: ACTIVE | Noted: 2020-04-13

## 2020-04-13 LAB
ALBUMIN SERPL-MCNC: 2.7 G/DL (ref 3.5–5.2)
ALP BLD-CCNC: 1342 U/L (ref 35–104)
ALT SERPL-CCNC: 143 U/L (ref 0–32)
ANION GAP SERPL CALCULATED.3IONS-SCNC: 9 MMOL/L (ref 7–16)
AST SERPL-CCNC: 78 U/L (ref 0–31)
BASOPHILS ABSOLUTE: 0.03 E9/L (ref 0–0.2)
BASOPHILS RELATIVE PERCENT: 0.2 % (ref 0–2)
BILIRUB SERPL-MCNC: 2 MG/DL (ref 0–1.2)
BUN BLDV-MCNC: 16 MG/DL (ref 8–23)
CALCIUM SERPL-MCNC: 8.3 MG/DL (ref 8.6–10.2)
CHLORIDE BLD-SCNC: 104 MMOL/L (ref 98–107)
CO2: 18 MMOL/L (ref 22–29)
CREAT SERPL-MCNC: 0.9 MG/DL (ref 0.5–1)
EOSINOPHILS ABSOLUTE: 0.02 E9/L (ref 0.05–0.5)
EOSINOPHILS RELATIVE PERCENT: 0.2 % (ref 0–6)
GFR AFRICAN AMERICAN: >60
GFR NON-AFRICAN AMERICAN: >60 ML/MIN/1.73
GLUCOSE BLD-MCNC: 201 MG/DL (ref 74–99)
HCT VFR BLD CALC: 23.3 % (ref 34–48)
HCT VFR BLD CALC: 23.7 % (ref 34–48)
HCT VFR BLD CALC: 24.8 % (ref 34–48)
HEMOGLOBIN: 7.2 G/DL (ref 11.5–15.5)
HEMOGLOBIN: 7.3 G/DL (ref 11.5–15.5)
HEMOGLOBIN: 7.9 G/DL (ref 11.5–15.5)
IMMATURE GRANULOCYTES #: 0.13 E9/L
IMMATURE GRANULOCYTES %: 1 % (ref 0–5)
INR BLD: 1.1
LYMPHOCYTES ABSOLUTE: 2.12 E9/L (ref 1.5–4)
LYMPHOCYTES RELATIVE PERCENT: 16 % (ref 20–42)
MCH RBC QN AUTO: 30.3 PG (ref 26–35)
MCHC RBC AUTO-ENTMCNC: 30.8 % (ref 32–34.5)
MCV RBC AUTO: 98.3 FL (ref 80–99.9)
METER GLUCOSE: 311 MG/DL (ref 74–99)
MONOCYTES ABSOLUTE: 0.93 E9/L (ref 0.1–0.95)
MONOCYTES RELATIVE PERCENT: 7 % (ref 2–12)
NEUTROPHILS ABSOLUTE: 10 E9/L (ref 1.8–7.3)
NEUTROPHILS RELATIVE PERCENT: 75.6 % (ref 43–80)
PDW BLD-RTO: 14.9 FL (ref 11.5–15)
PLATELET # BLD: 230 E9/L (ref 130–450)
PMV BLD AUTO: 11.8 FL (ref 7–12)
POTASSIUM SERPL-SCNC: 5.6 MMOL/L (ref 3.5–5)
PROTHROMBIN TIME: 12.2 SEC (ref 9.3–12.4)
RBC # BLD: 2.41 E12/L (ref 3.5–5.5)
SODIUM BLD-SCNC: 131 MMOL/L (ref 132–146)
TOTAL PROTEIN: 6.1 G/DL (ref 6.4–8.3)
WBC # BLD: 13.2 E9/L (ref 4.5–11.5)

## 2020-04-13 PROCEDURE — 6370000000 HC RX 637 (ALT 250 FOR IP): Performed by: SURGERY

## 2020-04-13 PROCEDURE — 85025 COMPLETE CBC W/AUTO DIFF WBC: CPT

## 2020-04-13 PROCEDURE — 85014 HEMATOCRIT: CPT

## 2020-04-13 PROCEDURE — 2580000003 HC RX 258: Performed by: SURGERY

## 2020-04-13 PROCEDURE — 36415 COLL VENOUS BLD VENIPUNCTURE: CPT

## 2020-04-13 PROCEDURE — APPSS45 APP SPLIT SHARED TIME 31-45 MINUTES: Performed by: PHYSICIAN ASSISTANT

## 2020-04-13 PROCEDURE — 99233 SBSQ HOSP IP/OBS HIGH 50: CPT | Performed by: INTERNAL MEDICINE

## 2020-04-13 PROCEDURE — 85018 HEMOGLOBIN: CPT

## 2020-04-13 PROCEDURE — 99223 1ST HOSP IP/OBS HIGH 75: CPT | Performed by: NURSE PRACTITIONER

## 2020-04-13 PROCEDURE — 82962 GLUCOSE BLOOD TEST: CPT

## 2020-04-13 PROCEDURE — 92610 EVALUATE SWALLOWING FUNCTION: CPT | Performed by: SPEECH-LANGUAGE PATHOLOGIST

## 2020-04-13 PROCEDURE — 85610 PROTHROMBIN TIME: CPT

## 2020-04-13 PROCEDURE — 2580000003 HC RX 258: Performed by: INTERNAL MEDICINE

## 2020-04-13 PROCEDURE — 1200000000 HC SEMI PRIVATE

## 2020-04-13 PROCEDURE — 80053 COMPREHEN METABOLIC PANEL: CPT

## 2020-04-13 PROCEDURE — 97530 THERAPEUTIC ACTIVITIES: CPT

## 2020-04-13 PROCEDURE — 92523 SPEECH SOUND LANG COMPREHEN: CPT | Performed by: SPEECH-LANGUAGE PATHOLOGIST

## 2020-04-13 PROCEDURE — 6360000002 HC RX W HCPCS: Performed by: SURGERY

## 2020-04-13 PROCEDURE — C9113 INJ PANTOPRAZOLE SODIUM, VIA: HCPCS | Performed by: SURGERY

## 2020-04-13 RX ORDER — SODIUM CHLORIDE 9 MG/ML
INJECTION, SOLUTION INTRAVENOUS CONTINUOUS
Status: DISCONTINUED | OUTPATIENT
Start: 2020-04-13 | End: 2020-04-15 | Stop reason: HOSPADM

## 2020-04-13 RX ADMIN — NITROGLYCERIN 0.5 INCH: 20 OINTMENT TOPICAL at 06:18

## 2020-04-13 RX ADMIN — NITROGLYCERIN 0.5 INCH: 20 OINTMENT TOPICAL at 23:23

## 2020-04-13 RX ADMIN — NITROGLYCERIN 0.5 INCH: 20 OINTMENT TOPICAL at 13:11

## 2020-04-13 RX ADMIN — PANTOPRAZOLE SODIUM 40 MG: 40 INJECTION, POWDER, FOR SOLUTION INTRAVENOUS at 20:48

## 2020-04-13 RX ADMIN — METOPROLOL TARTRATE 12.5 MG: 25 TABLET, FILM COATED ORAL at 08:43

## 2020-04-13 RX ADMIN — SODIUM CHLORIDE: 9 INJECTION, SOLUTION INTRAVENOUS at 13:12

## 2020-04-13 RX ADMIN — PANTOPRAZOLE SODIUM 40 MG: 40 INJECTION, POWDER, FOR SOLUTION INTRAVENOUS at 08:43

## 2020-04-13 RX ADMIN — SODIUM CHLORIDE, PRESERVATIVE FREE 10 ML: 5 INJECTION INTRAVENOUS at 08:47

## 2020-04-13 RX ADMIN — METOPROLOL TARTRATE 12.5 MG: 25 TABLET, FILM COATED ORAL at 20:48

## 2020-04-13 RX ADMIN — Medication 10 ML: at 08:44

## 2020-04-13 RX ADMIN — SODIUM CHLORIDE, PRESERVATIVE FREE 10 ML: 5 INJECTION INTRAVENOUS at 20:48

## 2020-04-13 RX ADMIN — NITROGLYCERIN 0.5 INCH: 20 OINTMENT TOPICAL at 18:19

## 2020-04-13 ASSESSMENT — PAIN SCALES - GENERAL: PAINLEVEL_OUTOF10: 1

## 2020-04-13 NOTE — PROGRESS NOTES
She uses smokeless tobacco.  ETOH:   reports no history of alcohol use. Home Medications  Prior to Admission medications    Medication Sig Start Date End Date Taking? Authorizing Provider   ibuprofen (ADVIL;MOTRIN) 200 MG tablet Take 400 mg by mouth every 6 hours as needed for Pain   Yes Historical Provider, MD   atorvastatin (LIPITOR) 80 MG tablet Take 80 mg by mouth nightly    Yes Historical Provider, MD   Doxylamine Succinate, Sleep, (SLEEP AID PO) Take 1 tablet by mouth nightly   Yes Historical Provider, MD   influenza virus trivalent vaccine (FLUZONE) injection Inject 0.5 mLs into the muscle once Given 11/2019   Yes Historical Provider, MD   Lutein 6 MG TABS Take 1 tablet by mouth 2 times daily    Yes Historical Provider, MD   Cholecalciferol (VITAMIN D3) 2000 UNITS TABS Take 1 tablet by mouth daily. Yes Historical Provider, MD   Multiple Vitamins-Minerals (VITEYES AREDS ADVANCED PO) Take 1 tablet by mouth 2 times daily. Yes Historical Provider, MD   aspirin 81 MG EC tablet Take 81 mg by mouth daily    Yes Historical Provider, MD       Allergies  Allergies   Allergen Reactions    Nickel     Other      An anesthesia medication    Sulfa Antibiotics Other (See Comments)     confusion    Vicodin [Hydrocodone-Acetaminophen] Hives    Morphine Nausea And Vomiting    Penicillins Rash    Tape Otilio Shivers Tape] Rash    Versed [Midazolam] Hives and Nausea And Vomiting       Review of Systems:    Constitutional:  No fever chills or rigors. Eyes: No changes in vision, discharge, or pain  ENT: No Headaches, hearing loss or vertigo. No mouth sores or sore throat. No change in taste or smell. Cardiovascular: No chest discomfort, dyspnea on exertion, palpitations or loss of consciousness. or phlebitis. Respiratory: Has no cough or wheezing, Has no sputum production. Has no hemoptysis, Has no pleuritic pain, . Gastrointestinal: No abdominal pain, appetite loss, blood in stools.  No change in bowel Date     (L) 04/13/2020    K 5.6 (H) 04/13/2020     04/13/2020    CO2 18 (L) 04/13/2020    BUN 16 04/13/2020    CREATININE 0.9 04/13/2020    GLUCOSE 201 (H) 04/13/2020    CALCIUM 8.3 (L) 04/13/2020    PROT 6.1 (L) 04/13/2020    LABALBU 2.7 (L) 04/13/2020    BILITOT 2.0 (H) 04/13/2020    ALKPHOS 1,342 (H) 04/13/2020    AST 78 (H) 04/13/2020     (H) 04/13/2020    LABGLOM >60 04/13/2020    GFRAA >60 04/13/2020       Lab Results   Component Value Date    IRON 107 04/09/2020    TIBC 304 04/09/2020    FERRITIN 1,536 04/09/2020           Radiology-    FL ERCP BILIARY AND PANCREATIC S&I   Final Result   Distended biliary ductal system with abrupt caliber change in the region of   the pancreatic head correlating to recent CT and MR imaging. A biliary stent   was placed. MRI ABDOMEN WO CONTRAST MRCP   Final Result   1. Severe intrahepatic and extrahepatic bile duct dilatation is seen with   abrupt narrowing of the distal common bile duct, concerning for a   short-segment stricture or mass near the ampulla/pancreatic head. Further   evaluation with ERCP is recommended. 2. Low-attenuation area is seen within the pancreatic head measuring 1.9 cm. Further evaluation with CT pancreatic mass protocol is recommended to exclude   a pancreatic head mass. 3. Minimal ascites is seen within the upper abdomen. CT Chest WO Contrast   Final Result   1. Right lower lobe 1.4 x 1.0 cm nodule with eccentric cavity. Differential   includes malignancy. Recommend PET-CT and/or tissue diagnosis and on a   nonemergent basis. 2. Intra and extrahepatic ductal dilatation. No convincing identifiable   cause on this noncontrast exam; however, there is abrupt cutoff of the common   bile duct distally, near the ampulla of Vater. Differential includes   ampullary mass. Consider MRCP for further evaluation. 3. Broad-based anterior abdominal hernia contains the anterior wall of the   transverse colon.   No evidence of obstruction or inflammation. 4. Atherosclerosis and coronary artery disease. 5. Hysterectomy. 6. Multiple sub 5 mm nodules as listed above. Recommend CT chest follow-up   in 12 months. CT ABDOMEN PELVIS WO CONTRAST Additional Contrast? None   Final Result   1. Right lower lobe 1.4 x 1.0 cm nodule with eccentric cavity. Differential   includes malignancy. Recommend PET-CT and/or tissue diagnosis and on a   nonemergent basis. 2. Intra and extrahepatic ductal dilatation. No convincing identifiable   cause on this noncontrast exam; however, there is abrupt cutoff of the common   bile duct distally, near the ampulla of Vater. Differential includes   ampullary mass. Consider MRCP for further evaluation. 3. Broad-based anterior abdominal hernia contains the anterior wall of the   transverse colon. No evidence of obstruction or inflammation. 4. Atherosclerosis and coronary artery disease. 5. Hysterectomy. 6. Multiple sub 5 mm nodules as listed above. Recommend CT chest follow-up   in 12 months. XR CHEST PORTABLE   Final Result   1. Bibasilar airspace opacities. Given the clinical context, differential   includes pneumonia, including atypical/viral pneumonia. .               ASSESSMENT/PLAN :  69 yo female  Transaminitis  Biliary obstruction  TERRELL  RLL cavitary lesion  Anemia    - Her anemia is likely due to inflammation with myelosuppression. Ferritin elevation with normal serum iron supports this. B12/folate are normal, as is MCV  - Transfuse for <8 with NSTEMI  - GI consult, consider HBS consult as well depending on imaging. Planning EUS vs ERCP  - GGT elevated confirming alk phos from liver  - MRCP has been done w/o contrast, results pending. May need to repeat with contrast once renal function improved  - RLL 1.4 x 1.0 cm cavitary mass suspicious for primary lung neoplasm vs metastatic deposit from GI primary.  Lung biopsies are difficult at this time as both IR and

## 2020-04-13 NOTE — PROGRESS NOTES
SPEECH/LANGUAGE PATHOLOGY  SPEECH/LANGUAGE/COGNITIVE EVALUATION      PATIENT NAME:  James Wolff      :  1940          TODAY'S DATE:  2020 ROOM:  [address] [place] [address]     ADMITTING DIAGNOSIS: Acute respiratory failure with hypoxia ([facility] [J96.01]  Sepsis ([facility] [A41.9]    SPEECH PATHOLOGY DIAGNOSIS:    Moderate cognitive- linguistic deficits-- baseline unknown at this time. THERAPY RECOMMENDATIONS:   Speech Pathology intervention is recommended 3-6 times per week for LOS or when goals are met with emphasis on the following:     Attention to increase safety awareness during transfers/ ambulation and completion of ADL/iADL tasks with moderate. Immediate/ STM for functional information to complete tasks as instructed and recall pertinent medical information with moderate and/or use of external memory aide training. Problem solving/ insight/ judgement for various ADL/iADL tasks, including but not limited to: medication management and money/ finance management, overall safety awareness in the PLOF with 50% accuracy  Receptive and expressive language skills with adequate thought content, organization, and processing time to facilitate improved communication with moderate. MOTOR SPEECH       Oral Peripheral Examination   Could not test as Pt unable to follow directions    Parameters of Speech Production  Respiration:  Adequate for speech production  Articulation:  Within functional limits  Resonance:  Within functional limits  Quality:   Within functional limits  Pitch:     Within functional limits  Intensity: Within functional limits  Fluency:  Intact  Prosody Intact    RECEPTIVE LANGUAGE    Comprehension of Yes/No Questions:   Inconsistent    Process  Simple Verbal Commands:   Within functional limits  Process Intermediate Verbal Commands:   Within functional limits  Process Complex Verbal Commands:     Incomplete    Comprehension of Conversation:      Inconsistent      EXPRESSIVE LANGUAGE Serials: Functional    Imitation:  Words   Functional   Sentences Impaired      Conversation:      Confusion was noted during conversation    COGNITION     Attention/Orientation  Attention: Easily Distracted  Orientation:  Oriented to Person, Place, Date (Month/ Year, knew recent holiday)    Memory   Immediate Recall: Repeated 2/3    Delayed Recall:   Recalled 0/3    Long Term Recall:   Recalled Address, Birthdate and Age    Organization/Problem Solving/Reasoning   Verbal Sequencing: To be assessed        Verbal Problem solving: To be assessed         CLINICAL OBSERVATIONS NOTED DURING THE EVALUATION  Latent responses, Inconsistent responses and Cueing was required                  The Speech Language Pathologist (SLP) completed education with the patient regarding results of evaluation. Explained that Speech Pathology intervention is warranted  at this time   Prognosis for improvements is fair     This plan will be re-evaluated and revised in 1 week  if warranted. Patient stated goals: Did not state,   Treatment goals discussed with Patient   The Patient understand(s) the diagnosis, prognosis and plan of care at this time however question overall cog status vs baseline. CPT code:    89028  eval speech sound lang comprehension      The admitting diagnosis and active problem list, as listed below have been reviewed prior to initiation of this evaluation.         ACTIVE PROBLEM LIST:   Patient Active Problem List   Diagnosis    Carpal tunnel syndrome    Sprain of hand    Sprain and strain of unspecified site of shoulder and upper arm    Contusion of forearm    Shoulder pain    Shoulder impingement    Sciatica    Lumbar spinal stenosis    DDD (degenerative disc disease), lumbar    Bulging lumbar disc    DJD (degenerative joint disease) of knee    Knee pain    Medial meniscus tear    Cubital tunnel syndrome of both upper extremities    Acute respiratory failure with hypoxia ([facility]    Septicemia ([facility]    NSTEMI (non-ST elevated myocardial infarction) ([facility]    Biliary obstruction    Pancreatic mass    Pancreatic cancer ([facility]

## 2020-04-13 NOTE — DISCHARGE INSTR - COC
Continuity of Care Form    Patient Name: Tiarra Ramírez   :  1940  MRN:  [id number]    Admit date:  2020  Discharge date:  ***    Code Status Order: [facility]   Advance Directives:   [facility] Documentation     Date/Time Healthcare Directive Type of Healthcare Directive Copy in [address] Agent's Name Healthcare Agent's Phone Number    04/10/20 [phone]  Yes, patient has an advance directive for healthcare treatment  Living will  No, copy requested from family  Healthcare power of   Anika Olson  [phone]          Admitting Physician:  Milka Ramirez DO  PCP: Roslyn Gutierrez DO    Discharging Nurse: [facility] Unit/Room#: 3142/7149-35  Discharging Unit Phone Number: [phone]    Emergency Contact:   Extended Emergency Contact Information  Primary Emergency Contact: Kimberly Clemens  Address: [address] [place] [address] Phone: [phone]  Mobile Phone: [phone]  Relation: Child  Preferred language: English   needed? No  Secondary Emergency Contact: [address] Phone: [phone]  Relation: Other  Preferred language: English   needed?  No    Past Surgical History:  Past Surgical History:   Procedure Laterality Date    ABDOMINAL AORTIC ANEURYSM REPAIR      APPENDECTOMY      CARDIAC SURGERY      abdominal aortic bypass    CARPAL TUNNEL RELEASE      right    CARPAL TUNNEL RELEASE  3/27/12    right    CARPAL TUNNEL RELEASE Left 2016    CERVICAL DISCECTOMY      CORONARY ARTERY BYPASS GRAFT      patient denies having heart surgery  9/9/15    ERCP N/A 2020    ERCP SPHINCTER/PAPILLOTOMY performed by Анна Burns MD at [address] [place] [address] ERCP N/A 2020    ERCP STENT INSERTION performed by Анна Burns MD at [address] [place] [address] ERCP N/A 2020    ERCP DIAGNOSTIC WITH BRUSHINGS performed by Анна Burns MD at [address] [place] [address]

## 2020-04-13 NOTE — PROGRESS NOTES
SPEECH/LANGUAGE PATHOLOGY  CLINICAL ASSESSMENT OF SWALLOWING FUNCTION    PATIENT NAME:  Tino Aguilar      :  1940      TODAY'S DATE:  2020  ROOM:  1239/8020-00    SUMMARY OF EVALUATION     DYSPHAGIA DIAGNOSIS:  Moderate oral dysphagia, functional pharyngeal swallow at this time. DIET RECOMMENDATIONS:  Dysphagia 1, Pureed solids with  thin liquids     FEEDING RECOMMENDATIONS:     Assistance level:  Full assistance is needed during all oral intake      Compensatory strategies recommended: Small bites/sips, Alternate solids and liquids and Check for oral pocketing    THERAPY RECOMMENDATIONS:      Dysphagia therapy is recommended 3-5 times per week for LOS or when goals are met. Pt will complete oral motor strength/ coordination exercises to improve bolus prep/ control and mastication with  moderate verbal prompts . Ongoing meal endurance analysis to provide diet modification and compensatory strategy implementation to minimize risk of aspiration associated with PO intake  Pt will complete PO trials of upgraded diet textures with SLP only to determine the least restrictive PO diet to maintain adequate nutrition/hydration with no more than 1 overt s/s of pen/asp. PROCEDURE     Consistencies Administered During the Evaluation   Liquids: thin liquid   Solids:  pureed foods and soft solid foods      Method of Intake:   cup, straw, spoon  Self fed, Fed by clinician      Position:   Seated, upright                  RESULTS     Oral Stage:         missing teeth, Oral residuals were noted :  throughout the oral cavity for soft solids with Pt unaware and without awareness of residuals, clearing after 4-5 swallows and Decreased bolus formation      Pharyngeal Stage:      No signs of aspiration were noted during this evaluation however, silent aspiration cannot be ruled out at bedside.   If silent aspiration is suspected, a Videofluoroscopic Study of Swallowing (MBS) is recommended and

## 2020-04-13 NOTE — PROGRESS NOTES
Liaison Informational Visit Note      Referral received from [facility]           Call back number        Patient Name: Prudence Williamson   :  1940  MRN:  [id number]    [facility] date:  2020    Admitted from: [facility] Admitting Physician:  Erika Gutiérrez DO   PCP:  Veronica Jaime DO      Primary Insurance: Payor: Kareem Giraldo /  /  /    Secondary Insurance:      Emergency Contact:      Contact/Relation:   /         Phone:       Contact/Relation:   /     Phone:     Advance Directive  Advance directives received No  Patient has completed an advance directive   Discussed with: Family member  DPOA-HC Name-Relation:Healthcare Agent's Name: Jesica Matter Agent's Phone Number: [phone]      Terminal Diagnosis cancer as confirmed by Dr. German Tejada, [address] [place] [address] Problem List:   Patient Active Problem List   Diagnosis Code    Carpal tunnel syndrome G56.00    Sprain of hand S63.90XA    Sprain and strain of unspecified site of shoulder and upper arm [id number]    Contusion of forearm S50.10XA    Shoulder pain M25.519    Shoulder impingement M75.40    Sciatica M54.30    Lumbar spinal stenosis M48.061    DDD (degenerative disc disease), lumbar M51.36    Bulging lumbar disc M51.26    DJD (degenerative joint disease) of knee M17.10    Knee pain M25.569    Medial meniscus tear S83.249A    Cubital tunnel syndrome of both upper extremities G56.23    Acute respiratory failure with hypoxia ([address] J96.01    Septicemia ([address] A41.9    NSTEMI (non-ST elevated myocardial infarction) ([address] I21.4    Biliary obstruction K83.1    Pancreatic mass K86.89    Pancreatic cancer ([address] C25.9       Code Status Order: DNR-CC     Past Medical History:        Diagnosis Date    Anxiety     Asthma     Blind one eye     right eye    CAD (coronary artery disease)     Carpal tunnel syndrome, bilateral     COPD (chronic obstructive pulmonary disease) ([address] did say they are trying to get some skilled time for her mother to try and gain strength. Hospice philosophy discussed. In patients with a life-limiting illness, no further aggressive treatment is sought. This means no IV fluids, IV antibiotics, blood products, chemotherapy or dialysis. Comfort and symptom management are the goals of care. The hospice team is the physician, nurse, bath aides, , and LSW. Discussed the frequency of all team visits in routine hospice care. The [facility] is used for short term symptom management. In this high level of hospice care, the physician or nurse practitioner evaluate patients daily for symptoms. Once the symptoms are managed patients are changed to a routine level of care. Discussed the Transition program which is available for eight weeks, requires private payment for room and board fees which are not covered by any insurances. I told her if patients are in a nursing home with hospice the room and board fees are also private pay, but may be covered under Medicaid or VA benefits. Danuta Barcenas said she was waiting to hear back from [facility] about rehab. I told Danuta Barcenas patients do not have to be in the hospital to choose hospice care. I told Danuta Barcenas if her mother gets to nursing home and they decide on hospice, the facility can contact us. I called Mecca Molina bedside nurse at [facility] and updated her. \[facility]\"" PLAN:  1. \[facility]\"" is not presently providing any care for this patient. 2. Can follow up with daughter tomorrow if she has any questions. Daughter would prefer skilled time first.  3. Please contact \[facility]\"" at [phone] with any questions. Discharge Plan:  Discharge Disposition;    Facility Name:     Electronically signed by Farzana Shin RN on 4/13/2020 at 4:18 PM

## 2020-04-13 NOTE — CARE COORDINATION
SS Note: Per Howard Perez, [address], Fax [phone], bed will be available tomorrow and able to accept pt IF PT WILL NOT BE RECEIVING CHEMO. This SW had received call earlier this afternoon from [facility], stated she spoke with both daughters/POA today and both stated \"pt wouldn't want chemo:\" This SW updated pt's daughter Gabbie Gold, [phone], and she stated pt would not be receiving chemo. Note in chart from [place] [address] stated daughter plans for pt to have SNF stay for pt to gain strength and daughter is aware if her mother gets to nursing home and they decide on hospice, the facility can contact [facility]. HENS Exemption form completed. NO PRECERT required. Electronic KAREN in pt's EPIC Chart for physician signature.   Electronically signed by SIVA Foy on 4/13/2020 at 5:29 PM

## 2020-04-13 NOTE — CONSULTS
Palliative Care Department  Palliative Care Initial Consult  Provider: Sebas Huynh [facility]    Hospital Day: 5    Referring Provider:  Sabra Treadwell DO    Reason for Consult:  [x]  Code status Discussion  [x]  Assist with goals of care  [x]  Psychosocial support  [x]  Symptom Management  []  Advanced Care Planning    Chief Complaint: Nat Huitron is a 70 y.o. female with chief complaint of fatigue, cough, fever    Active Hospital Problems    Diagnosis Date Noted    Pancreatic cancer ([facility] [C25.9] 04/13/2020    Biliary obstruction [K83.1] 04/11/2020    Pancreatic mass [K86.89] 04/11/2020    NSTEMI (non-ST elevated myocardial infarction) ([facility] [I21.4] 04/10/2020    Acute respiratory failure with hypoxia ([facility] [J96.01] 04/09/2020    Sepsis ([facility] [A41.9] 04/09/2020       Palliative Care Encounter/Recommendations:      - Goals of care: support for family/caregiver, to continue skilled therapies and to be determined     - Code Status: DNR-CC     - Symptom Management:  No documented complaints at this time. Will continue to follow along and assist with symptoms as warranted    Subjective:     HPI:  Nat Huitron is a 70 y.o. female with significant past medical history of anxiety, asthma,CAD, COPD, HTN, IBS, DM, blind OD, history of UTI who presented to the ER on 4/9/2020 with complaints of fatigue, cough, fever. She also complained of decreased PO intake, weakness, and chills. Patient was worked up in the ER and treated with 1 L NS, azithromycin, and rocephin for septicemia and pneumonia. She was admitted to telemetry. On 412/2020 patient underwent an ERCP with brushings, papillotomy, and stent placement. Tumor markers are suggestive of concern for metastatic disease to the lung. The patient has poor performance status and documented overall poor prognosis. Subjective/Events/Discussions:  4/13/2020  1100  Telephone call to patient's daughter Blu Foy, three way call to include Karen Lundy, daughter. Palliative medicine introduced, goals of care and CODE status discussed at length. Abdiaziz Rowley reports that her mother was totally independent in her home last week and was using a wheeled walker to ambulate. She states that on Tuesday and Wednesday of last week patient had fallen and was unable to get up without assistance. Benjamin Lirianoma that she had tried to transfer the patient to the [facility] and was unable. Discussion held regarding noted physician progress notes of elevated tumor markers, suspected pancreatic head mass concerning for pancreatic cancer and multiple lung nodules suspicious for metastatic disease. Both daughters understand and had conversation with Dr. Darlene Doshi on Saturday regarding the ERCP and stent placement. Extensive discussion held with both daughters to review DNR-CCA vs DNR-CC and further goals of care. Abdiaziz Rowley states that at this time she would not be able to care for her mom in her home unless she becomes stronger and would like her to go to a facility for therapy, however they are both in agreement that their mom would not want aggressive treatment regardless of the outcome of the cytology results. Both daughters are actively employed and working outside the home. The daughters share HCPOA. They are tearful as the patient's change in condition was sudden and now they are being told that she may have less than six months. Understanding DNR pamphlet sent to the daughter Abdiaziz Rowley for she and her sister to review. Call back number for Palliative Medicine given for follow up regarding CODE status. 1440  Call back from daughters Abdiaziz Rowley and Juan Delgado. They would like to change the patient's CODE status to [facility] and are receptive to a Hospice referral for information only at this time. Family choiced for hospice services and they have chosen [facility].   Their wishes are for the patient to continue with therapy for some quality of life being able to sit up and even transfer, [facility] that will make her happy and feel a little better\", and then she can be transferred to a SNF. Daughters informed that the CODE STATUS will be changed today and an order will be place for a Hospice informational referral.  Call to nurse's station, floor nurse unavailable for update at present. Call to  [facility] intake department for placement of the referral.  Palliative medicine will continue to follow.     - Family Meeting:   Participants:Child   Family meeting was held to discuss:CODE status, diagnosis, goals of care, prior expressed wishes and advanced care planning    -Surrogate/Legal NOK: Child    Contacts:  [facility] (dtr) [phone]           Svitlana Alen (dtr)  [phone]    Past Medical History:   Diagnosis Date    Anxiety     Asthma     Blind one eye     right eye    CAD (coronary artery disease)     Carpal tunnel syndrome, bilateral     COPD [facility]chronic obstructive pulmonary disease) [facility]     patient denies    Hypertension     IBS (irritable bowel syndrome)     patient denies having IBS    Macular degeneration bilateral    PONV (postoperative nausea and vomiting)     Type II or unspecified type diabetes mellitus without mention of complication, not stated as uncontrolled     UTI (urinary tract infection)     susceptible       Past Surgical History:   Procedure Laterality Date    ABDOMINAL AORTIC ANEURYSM REPAIR      APPENDECTOMY      CARDIAC SURGERY  1991    abdominal aortic bypass    CARPAL TUNNEL RELEASE  2005    right    CARPAL TUNNEL RELEASE  3/27/12    right    CARPAL TUNNEL RELEASE Left 07/01/2016    CERVICAL DISCECTOMY      CORONARY ARTERY BYPASS GRAFT      patient denies having heart surgery  9/9/15    ERCP N/A 4/12/2020    ERCP SPHINCTER/PAPILLOTOMY performed by David Arellano MD at [address] [place] [address] ERCP N/A 4/12/2020    ERCP STENT INSERTION performed by David Arellano MD at [address] [place] [address] ERCP N/A 4/12/2020    ERCP DIAGNOSTIC WITH BRUSHINGS performed by throughout  Heart[de-identified]  NSR per monitor  Abd:  Soft, BS+ x4  :  Copeland catheter  Ext:  Moving all extremities, BLE weak, no edema, pulses present  Skin:  Jaundice and dry  Neuro:  PERRL, Alert, disoriented x 4; follows commands    Imaging    MRI Abdomen WO contrast MRCP 4/10/2020    . Severe intrahepatic and extrahepatic bile duct dilatation is seen with   abrupt narrowing of the distal common bile duct, concerning for a   short-segment stricture or mass near the ampulla/pancreatic head.  Further   evaluation with ERCP is recommended. 2. Low-attenuation area is seen within the pancreatic head measuring 1.9 cm. Further evaluation with CT pancreatic mass protocol is recommended to exclude   a pancreatic head mass. 3. Minimal ascites is seen within the upper abdomen.           CT Chest, Abdomen, Pelvis  WO Contrast 4/9/2019  1. Right lower lobe 1.4 x 1.0 cm nodule with eccentric cavity.  Differential   includes malignancy.  Recommend PET-CT and/or tissue diagnosis and on a   nonemergent basis. 2. Intra and extrahepatic ductal dilatation.  No convincing identifiable   cause on this noncontrast exam; however, there is abrupt cutoff of the common   bile duct distally, near the ampulla of Vater.  Differential includes   ampullary mass.  Consider MRCP for further evaluation. 3. Broad-based anterior abdominal hernia contains the anterior wall of the   transverse colon.  No evidence of obstruction or inflammation. 4. Atherosclerosis and coronary artery disease. 5. Hysterectomy. 6. Multiple sub 5 mm nodules as listed above.  Recommend CT chest follow-up   in 12 months.           CXR 4/9/2020    1. Bibasilar airspace opacities.  Given the clinical context, differential   includes pneumonia, including atypical/viral pneumonia. .       Current Medications:  Inpatient medications reviewed: yes  Home Medications reviewed: yes    Results/Verification of Data Review  Objective data reviewed: labs, images, records,

## 2020-04-13 NOTE — PROGRESS NOTES
[facility] Quality Flow/Interdisciplinary Rounds Progress Note        Quality Flow Rounds held on April 13, 2020    Disciplines Attending:  Bedside Nurse, ,  and Nursing Unit Leadership    Tye Lamas was admitted on 4/9/2020  6:01 AM    Anticipated Discharge Date:  Expected Discharge Date: 04/13/20    Disposition:    Nabor Score:  Nabor Scale Score: 18    Readmission Risk              Risk of Unplanned Readmission:        9           Discussed patient goal for the day, patient clinical progression, and barriers to discharge.   The following Goal(s) of the Day/Commitment(s) have been identified:  Activity progression, plan is KEITH Evans IV F, Sharrell Killian  April 13, 2020

## 2020-04-13 NOTE — PROGRESS NOTES
04/13/2020    BUN 16 04/13/2020    CREATININE 0.9 04/13/2020    GFRAA >60 04/13/2020    LABGLOM >60 04/13/2020    GLUCOSE 201 04/13/2020    GLUCOSE 208 03/23/2012    PROT 6.1 04/13/2020    LABALBU 2.7 04/13/2020    CALCIUM 8.3 04/13/2020    BILITOT 2.0 04/13/2020    ALKPHOS 1,342 04/13/2020    AST 78 04/13/2020     04/13/2020       Assessment:   1. Short segment stricture or mass near the ampulla/pancreatic head with concern for an underlying malignant process status post ERCP with biliary stenting and ultimate plans for EUS  2. Non-ST elevated myocardial infarction with known history of coronary artery disease  3. Acute renal failure upon presentation with complete resolution  4. Multiple lung nodules with concern for a metastatic malignant process  5. Normochromic normocytic anemia  6. Hyperlipidemia  7. Vitamin D deficiency  8. Early dementia    Plan:   Rasheed Bangura tolerated ERCP with stenting yesterday without complication. She denies any significant postoperative abdominal pain. Diet is being advanced at the discretion of the surgical team.  There appears to be a grossly malignant process at play with concern for metastatic disease to the lung. The patient has an overall poor performance status and we will ask the palliative care team to provide consultation. The patient is demented at baseline. The patient's daughter has been updated by the surgery team.  The social work team is following and plans are for placement at a skilled nursing facility. We need to advance her diet as tolerated. Her long-term prognosis is poor. More than 50% of my  time was spent at the bedside counseling/coordinating care with the patient and/or family with face to face contact. This time was spent reviewing notes and laboratory data as well as instructing and counseling the patient.  Time I spent with the family or surrogate(s) is included only if the patient was incapable of providing the necessary information or

## 2020-04-13 NOTE — PROGRESS NOTES
and rhythm, possible soft systolic murmur LLSB, apex. No s3, s4 or rub. PV: No lower extremity edema. No varicosities. Pedal pulses palpable, no clubbing or cyanosis. ABDOMEN: Soft, non-tender to light palpation. Bowel sounds present. MS: Good muscle strength and tone. No atrophy or abnormal movements. SKIN: Warm and dry. NEURO / PSYCH: Oriented to person. Speech clear. Follows all commands. DATA:    Telemetry: Normal sinus rhythm in the 70s    Diagnostic:  All diagnostic testing and lab work thus far this admission reviewed in detail.     CXR 04/09/2020: Impression:  1. Bibasilar airspace opacities.  Given the clinical context, differential  includes pneumonia, including atypical/viral pneumonia.     CT Chest and Abdomen/Pelvis:  FINDINGS:  Chest:  Mediastinum: Heart size is normal.  No pericardial effusion.  Coronary artery  atherosclerosis.  Thoracic aorta is atherosclerotic.  Within the limitations  of a noncontrast exam, there is no evidence of hilar adenopathy.  No  mediastinal adenopathy.  Rim calcifications in the right lobe of the thyroid. Lungs/pleura: 1.4 x 1.0 cm right lower lobe nodule with cavity.  No pleural  effusion, pneumothorax, or evidence of pulmonary edema.  There is no focal  consolidation. 3 mm nodule right upper lobe (image 33). 3 mm left upper lobe nodule (image 33). 2 mm nodule in the left upper lobe (image 24). 4 mm left lower lobe nodule (image 79).   Soft Tissues/Bones: No aggressive lytic or blastic bony lesion.     Abdomen/Pelvis:  Organs: Intra and extrahepatic ductal dilatation.  Unenhanced spleen,  pancreas, and adrenal glands are unremarkable.  2.1 cm right upper pole  exophytic mass, likely a cyst based on its attenuation.  Nonobstructing 1 mm  right renal stone.  No hydronephrosis or perinephric stranding.  Exophytic  hypodense left renal cortical mass, suspected to be a cyst but not well  assessed on this noncontrast exam.  Bibasilar pulmonary system with abrupt caliber change in the region of  the pancreatic head correlating to recent CT and MR imaging.  A biliary stent  was placed. 2D TTE this admission:   Summary   Technically difficult study - very limited visualization. Normal left ventricular size and systolic function. Ejection fraction is visually estimated at 60-65%. Unable to completely assess diastolic function. No apparent regional wall motion abnormalities seen, but visualization of   endocardial borders was incomplete. Mild-moderate left ventricular concentric hypertrophy noted. Valve not well visualized but no significant abnormalities seen on Doppler   assessment. Intake/Output Summary (Last 24 hours) at 4/13/2020 0919  Last data filed at 4/13/2020 0708  Gross per 24 hour   Intake 2486.25 ml   Output 2125 ml   Net 361.25 ml       Labs:   CBC:   Recent Labs     04/12/20  0541  04/12/20  2343 04/13/20  0448   WBC 8.5  --   --  13.2*   HGB 8.8*   < > 7.4* 7.3*   HCT 28.4*   < > 23.9* 23.7*     --   --  230    < > = values in this interval not displayed. BMP:   Recent Labs     04/12/20  0541 04/13/20  0448    131*   K 4.8 5.6*   CO2 19* 18*   BUN 12 16   CREATININE 0.9 0.9   LABGLOM >60 >60   CALCIUM 8.6 8.3*     HgA1c:   Lab Results   Component Value Date    LABA1C 7.7 (H) 04/10/2020     PT/INR:   Recent Labs     04/12/20  0541 04/13/20  0448   PROTIME 12.0 12.2   INR 1.0 1.1     FASTING LIPID PANEL:  Lab Results   Component Value Date    CHOL 251 04/10/2020    HDL 44 04/10/2020    LDLCALC 154 04/10/2020    TRIG 263 04/10/2020     LIVER PROFILE:  Recent Labs     04/12/20  0541 04/13/20  0448   * 78*   * 143*   LABALBU 2.5* 2.7*         ASSESSMENT:  1. Elevated troponin: Likely type II demand ischemia given multiple comorbidities (renal insufficiency, anemia), however patient has multiple risk factors for coronary artery disease. Coronary calcifications noted on Chest CT.  Echocardiogram reviewed. Patient continues to deny any chest pain or shortness of breath at this time. 2. Abnormal EKG. 3. Hypertension: Better controlled today. 4. Acute kidney injury. 5. Diabetes mellitus type II. 6. Mass near ampulla/pancreatic head: Concerning for malignant process. Now s/p ERCP with biliary stenting. Plans for EUS. 7. Elevated LFTs, with elevated alkaline phosphatase. 8. Hyperlipidemia: Statin initiation on hold due to elevated LFTs. 9. Anemia: FOBT + this admission. GI service following. 10. Electrolyte abnormalities. 11. Likely dementia. RECOMMENDATIONS:  1. Stress testing/cardiac evaluation discussed in detail with patient's daughter over the phone, who has been involved with medical decision making for the patient. Family wishes for no aggressive cardiac work-up at this time (including stress testing) and for medical management only. 2. Continue current cardiac medications the same at this time. 3. Further recommendations as per Dr. Humberto Man. Above case and recommendations to be discussed with Dr. Humberto Man. Further recommendations and assessment/plan as per Dr. Humberto Man. Electronically signed by Cherie Wood PA-C on 4/13/2020 at 9:19 AM     I independently performed an evaluation on the patient. I have reviewed the above documentation completed by the advance practitioner. Please see my additional contributions to the HPI, physical exam, assessment and medical decision making. An Velasco D.O.   Cardiologist  Cardiology, [facility]

## 2020-04-14 LAB
ALBUMIN SERPL-MCNC: 2.7 G/DL (ref 3.5–5.2)
ALP BLD-CCNC: 1110 U/L (ref 35–104)
ALT SERPL-CCNC: 116 U/L (ref 0–32)
ANION GAP SERPL CALCULATED.3IONS-SCNC: 13 MMOL/L (ref 7–16)
AST SERPL-CCNC: 50 U/L (ref 0–31)
BASOPHILS ABSOLUTE: 0.03 E9/L (ref 0–0.2)
BASOPHILS RELATIVE PERCENT: 0.3 % (ref 0–2)
BILIRUB SERPL-MCNC: 1.8 MG/DL (ref 0–1.2)
BUN BLDV-MCNC: 12 MG/DL (ref 8–23)
CALCIUM SERPL-MCNC: 8.6 MG/DL (ref 8.6–10.2)
CHLORIDE BLD-SCNC: 98 MMOL/L (ref 98–107)
CO2: 18 MMOL/L (ref 22–29)
CREAT SERPL-MCNC: 0.8 MG/DL (ref 0.5–1)
CULTURE, BLOOD 2: NORMAL
EOSINOPHILS ABSOLUTE: 0.25 E9/L (ref 0.05–0.5)
EOSINOPHILS RELATIVE PERCENT: 2.5 % (ref 0–6)
GFR AFRICAN AMERICAN: >60
GFR NON-AFRICAN AMERICAN: >60 ML/MIN/1.73
GLUCOSE BLD-MCNC: 171 MG/DL (ref 74–99)
HCT VFR BLD CALC: 24.9 % (ref 34–48)
HCT VFR BLD CALC: 25.2 % (ref 34–48)
HCT VFR BLD CALC: 25.9 % (ref 34–48)
HCT VFR BLD CALC: 26.1 % (ref 34–48)
HCT VFR BLD CALC: 27.4 % (ref 34–48)
HEMOGLOBIN: 7.8 G/DL (ref 11.5–15.5)
HEMOGLOBIN: 8 G/DL (ref 11.5–15.5)
HEMOGLOBIN: 8.3 G/DL (ref 11.5–15.5)
HEMOGLOBIN: 8.4 G/DL (ref 11.5–15.5)
HEMOGLOBIN: 8.5 G/DL (ref 11.5–15.5)
IMMATURE GRANULOCYTES #: 0.15 E9/L
IMMATURE GRANULOCYTES %: 1.5 % (ref 0–5)
INR BLD: 1
LYMPHOCYTES ABSOLUTE: 2.74 E9/L (ref 1.5–4)
LYMPHOCYTES RELATIVE PERCENT: 26.9 % (ref 20–42)
MCH RBC QN AUTO: 30.9 PG (ref 26–35)
MCHC RBC AUTO-ENTMCNC: 31.7 % (ref 32–34.5)
MCV RBC AUTO: 97.3 FL (ref 80–99.9)
MONOCYTES ABSOLUTE: 0.93 E9/L (ref 0.1–0.95)
MONOCYTES RELATIVE PERCENT: 9.1 % (ref 2–12)
NEUTROPHILS ABSOLUTE: 6.08 E9/L (ref 1.8–7.3)
NEUTROPHILS RELATIVE PERCENT: 59.7 % (ref 43–80)
PDW BLD-RTO: 15.1 FL (ref 11.5–15)
PLATELET # BLD: 248 E9/L (ref 130–450)
PMV BLD AUTO: 11.3 FL (ref 7–12)
POTASSIUM SERPL-SCNC: 4.4 MMOL/L (ref 3.5–5)
PROTHROMBIN TIME: 11.7 SEC (ref 9.3–12.4)
RBC # BLD: 2.59 E12/L (ref 3.5–5.5)
SODIUM BLD-SCNC: 129 MMOL/L (ref 132–146)
TOTAL PROTEIN: 6.1 G/DL (ref 6.4–8.3)
WBC # BLD: 10.2 E9/L (ref 4.5–11.5)

## 2020-04-14 PROCEDURE — 99232 SBSQ HOSP IP/OBS MODERATE 35: CPT | Performed by: INTERNAL MEDICINE

## 2020-04-14 PROCEDURE — 1200000000 HC SEMI PRIVATE

## 2020-04-14 PROCEDURE — 80053 COMPREHEN METABOLIC PANEL: CPT

## 2020-04-14 PROCEDURE — 97110 THERAPEUTIC EXERCISES: CPT

## 2020-04-14 PROCEDURE — 85018 HEMOGLOBIN: CPT

## 2020-04-14 PROCEDURE — 6370000000 HC RX 637 (ALT 250 FOR IP): Performed by: INTERNAL MEDICINE

## 2020-04-14 PROCEDURE — 85014 HEMATOCRIT: CPT

## 2020-04-14 PROCEDURE — 6360000002 HC RX W HCPCS: Performed by: SURGERY

## 2020-04-14 PROCEDURE — 85610 PROTHROMBIN TIME: CPT

## 2020-04-14 PROCEDURE — 2580000003 HC RX 258: Performed by: INTERNAL MEDICINE

## 2020-04-14 PROCEDURE — 85025 COMPLETE CBC W/AUTO DIFF WBC: CPT

## 2020-04-14 PROCEDURE — 2580000003 HC RX 258: Performed by: SURGERY

## 2020-04-14 PROCEDURE — APPSS45 APP SPLIT SHARED TIME 31-45 MINUTES: Performed by: PHYSICIAN ASSISTANT

## 2020-04-14 PROCEDURE — 6370000000 HC RX 637 (ALT 250 FOR IP): Performed by: SURGERY

## 2020-04-14 PROCEDURE — C9113 INJ PANTOPRAZOLE SODIUM, VIA: HCPCS | Performed by: SURGERY

## 2020-04-14 PROCEDURE — 36415 COLL VENOUS BLD VENIPUNCTURE: CPT

## 2020-04-14 PROCEDURE — 97530 THERAPEUTIC ACTIVITIES: CPT

## 2020-04-14 RX ORDER — DIPHENHYDRAMINE HCL 25 MG
25 TABLET ORAL EVERY 6 HOURS PRN
Status: DISCONTINUED | OUTPATIENT
Start: 2020-04-14 | End: 2020-04-15 | Stop reason: HOSPADM

## 2020-04-14 RX ADMIN — NITROGLYCERIN 0.5 INCH: 20 OINTMENT TOPICAL at 23:53

## 2020-04-14 RX ADMIN — Medication 10 ML: at 09:26

## 2020-04-14 RX ADMIN — METOPROLOL TARTRATE 12.5 MG: 25 TABLET, FILM COATED ORAL at 09:24

## 2020-04-14 RX ADMIN — NITROGLYCERIN 0.5 INCH: 20 OINTMENT TOPICAL at 12:28

## 2020-04-14 RX ADMIN — PANTOPRAZOLE SODIUM 40 MG: 40 INJECTION, POWDER, FOR SOLUTION INTRAVENOUS at 20:11

## 2020-04-14 RX ADMIN — SODIUM CHLORIDE: 9 INJECTION, SOLUTION INTRAVENOUS at 17:11

## 2020-04-14 RX ADMIN — METOPROLOL TARTRATE 12.5 MG: 25 TABLET, FILM COATED ORAL at 20:11

## 2020-04-14 RX ADMIN — NITROGLYCERIN 0.5 INCH: 20 OINTMENT TOPICAL at 17:12

## 2020-04-14 RX ADMIN — DIPHENHYDRAMINE HCL 25 MG: 25 TABLET ORAL at 23:56

## 2020-04-14 RX ADMIN — NITROGLYCERIN 0.5 INCH: 20 OINTMENT TOPICAL at 07:08

## 2020-04-14 RX ADMIN — SODIUM CHLORIDE, PRESERVATIVE FREE 10 ML: 5 INJECTION INTRAVENOUS at 09:25

## 2020-04-14 RX ADMIN — PANTOPRAZOLE SODIUM 40 MG: 40 INJECTION, POWDER, FOR SOLUTION INTRAVENOUS at 09:24

## 2020-04-14 NOTE — PROGRESS NOTES
CARDIAC SURGERY  1991    abdominal aortic bypass    CARPAL TUNNEL RELEASE  2005    right    CARPAL TUNNEL RELEASE  3/27/12    right    CARPAL TUNNEL RELEASE Left 07/01/2016    CERVICAL DISCECTOMY      CORONARY ARTERY BYPASS GRAFT      patient denies having heart surgery  9/9/15    ERCP N/A 4/12/2020    ERCP SPHINCTER/PAPILLOTOMY performed by Edwar Smith MD at [address] [place] [address],[facility] ERCP N/A 4/12/2020    ERCP STENT INSERTION performed by Edwar Smith MD at [address] [place] [address],[facility] ERCP N/A 4/12/2020    ERCP DIAGNOSTIC WITH BRUSHINGS performed by Edwar Smith MD at [place] [address] Left     cataract extraction both eye    HYSTERECTOMY      LAPAROSCOPY      X6    NERVE BLOCK  11 30 2011    therapeutic caudal with epiduragram #1    TONSILLECTOMY AND ADENOIDECTOMY      UPPER GASTROINTESTINAL ENDOSCOPY  september 2015     Precautions: falls, alarm, confusion and hard of hearing, Blind right eye    SUBJECTIVE:    Social history: Patient lives alone in a apartment with Elevator to enter on 2nd floor Tub shower grab bars    Equipment owned: Rollator and Shower chair,       [address] [facility]   How much difficulty turning over in bed?: A Little  How much difficulty sitting down on / standing up from a chair with arms?: A Lot  How much difficulty moving from lying on back to sitting on side of bed?: A Lot  How much help from another person moving to and from a bed to a chair?: A Lot  How much help from another person needed to walk in hospital room?: A Lot  How much help from another person for climbing 3-5 steps with a railing?: Total  AM-PAC Inpatient Mobility Raw Score : 12  AM-PAC Inpatient T-Scale Score : 35.33  Mobility Inpatient CMS 0-100% Score: 68.66  Mobility Inpatient CMS G-Code Modifier : CL    Nursing cleared patient for PT treatment. Patient displayed confusion throughout treatment.       OBJECTIVE:   Initial Evaluation  Date: 4/10/2020 Treatment Date:     Short Term/

## 2020-04-14 NOTE — PROGRESS NOTES
difficult study - very limited visualization.   Normal left ventricular size and systolic function.   Ejection fraction is visually estimated at 60-65%.   Unable to completely assess diastolic function.   No apparent regional wall motion abnormalities seen, but visualization of   endocardial borders was incomplete.   Mild-moderate left ventricular concentric hypertrophy noted.   Valve not well visualized but no significant abnormalities seen on Doppler   assessment. Intake/Output Summary (Last 24 hours) at 4/14/2020 1025  Last data filed at 4/14/2020 0926  Gross per 24 hour   Intake 1620 ml   Output 3150 ml   Net -1530 ml       Labs:   CBC:   Recent Labs     04/13/20 0448  04/14/20  0048 04/14/20  0531   WBC 13.2*  --   --  10.2   HGB 7.3*   < > 8.5* 8.0*   HCT 23.7*   < > 27.4* 25.2*     --   --  248    < > = values in this interval not displayed. BMP:   Recent Labs     04/13/20 0448 04/14/20  0531   * 129*   K 5.6* 4.4   CO2 18* 18*   BUN 16 12   CREATININE 0.9 0.8   LABGLOM >60 >60   CALCIUM 8.3* 8.6     HgA1c:   Lab Results   Component Value Date    LABA1C 7.7 (H) 04/10/2020     PT/INR:   Recent Labs     04/13/20 0448 04/14/20  0531   PROTIME 12.2 11.7   INR 1.1 1.0     FASTING LIPID PANEL:  Lab Results   Component Value Date    CHOL 251 04/10/2020    HDL 44 04/10/2020    LDLCALC 154 04/10/2020    TRIG 263 04/10/2020     LIVER PROFILE:  Recent Labs     04/13/20 0448 04/14/20  0531   AST 78* 50*   * 116*   LABALBU 2.7* 2.7*         ASSESSMENT:  1. Elevated troponin: Likely type II demand ischemia given multiple comorbidities (renal insufficiency, anemia), however patient has multiple risk factors for coronary artery disease. Coronary calcifications noted on Chest CT. Echocardiogram reviewed. 2. Abnormal EKG. 3. Hypertension: Decently controlled this admission. 4. Acute kidney injury. 5. Diabetes mellitus type II.   6. Mass near ampulla/pancreatic head: Concerning for

## 2020-04-14 NOTE — PROGRESS NOTES
attenuation area within the pancreatic head measuring 1.9 cm  -Elevated  = 502  -Patient had ERCP and note reviewed. PT for possible EUS. Dr. Vy Silva and Nino Bocanegra following.     2. Elevated LFTs  - Alkaline phosphatase 1672  - GGT elevated indication hepatic source of elevated Alk Phose   - RODNEY negative AMA negative AFP is 3   - MRI / MRCP showing severe intra-and extrahepatic bile duct dilation with narrowing of the CBD distally  -Concerning for stricture or mass near the ampulla/pancreatic head  -Also with low attenuation area within the pancreatic head measuring 1.9 cm  -Elevated  = 502  -Recommend EUS and possible FNA -      3. Normocytic Anemia   - VSS No overt GI Bleed on exam   - Decreased hgb from baseline, mild decrease since admission  - Folate and B12 within normal limits   - No iron deficiency  - Continue supportive care   - Okay for diet from GI POV   - Colonoscopy and EGD remote hx per daughter  - Continue with Protonix BID   -FOBT positive consider colonoscopy to evaluate would have to be coordinated with EUS   -     4. Right Lower Lobe Nodule   - Pt continued to smoke   - concern for possible malignancy  - per admitting      5. Acute Kidney Injury  - per admitting         Patient's case was discussed with admitting team patient to transition to DNR CC with no more aggressive treatment wanted with discussion with patient's daughters.   GI will sign off the case please not hesitate to contact if we can be of further service      Pt was discussed with Dr. Viviana Kate DO  GI Fellow   4/14/2020  9:17 AM

## 2020-04-14 NOTE — PROGRESS NOTES
Internal Medicine Progress Note    Beba Shields. [facility]., & [address] [facility]  [facility] F.A.C.O.I. Angelo Linton D.O., F.A.C.O.I. Primary Care Physician: Alexa Kevin DO   Admitting Physician:  Jessica Dodd DO  Admission date and time: 4/9/2020  6:01 AM    Room:  [address] [place], [place] [address]-    Patient Name: Helder Lopez  MRN: [id number]    Date of Service: 4/14/2020     Subjective:  Merlin Speedy is seated at the bedside today resting comfortably. She is feeding herself and seems far more oriented. She is able to answer multiple questions. She denies any overt pain or discomfort. I spent an extended period of time speaking with her two daughters via the telephone. We discussed discharge planning. Review of System: HEENT:  Carlos ear pain, sore throat, sinus or eye problems  Cardiovascular:   Denies any chest pain, irregular heartbeats, or palpitations. Respiratory:   Denies shortness of breath, coughing, sputum production, hemoptysis, or wheezing. Gastrointestinal:   No current abdominal pain. She denies nausea. She admits to an increasing appetite and is tolerating a diet during my examination. Extremities:   Denies any lower extremity swelling or edema. Neurology:    Denies any headache or focal neurological deficits. She admits to weakness and deconditioning. Derm:    Denies any rashes, ulcers, or excoriations. Denies bruising. Genitourinary:    Denies any urgency, frequency, hematuria. Voiding without difficulty. Musculoskeletal:  Denies myalgias, joint complaints or back pain      Physical Exam:  I/O this shift: In: 900 [I.V.:900]  Out: 1550 [Urine:1550]  Blood pressure (!) 163/70, pulse 83, temperature 98 °F (36.7 °C), temperature source Oral, resp. rate 20, height 4' 11\" (1.499 m), weight 143 lb 1.6 oz (64.9 kg), SpO2 95 %. HEENT:    PERRLA. EOMI. Sclera is icteric. Buccal mucosa is moist.  Neck:    Supple. Trachea midline. No thyromegaly. No JVD.  No LABALBU 2.7 04/14/2020    CALCIUM 8.6 04/14/2020    BILITOT 1.8 04/14/2020    ALKPHOS 1,110 04/14/2020    AST 50 04/14/2020     04/14/2020       Assessment:   1. Short segment stricture or mass near the ampulla/pancreatic head with concern for an underlying malignant process status post ERCP with biliary stenting   2. Non-ST elevated myocardial infarction with known history of coronary artery disease  3. Acute renal failure upon presentation with complete resolution  4. Multiple lung nodules with concern for a metastatic malignant process  5. Normochromic normocytic anemia  6. Hyperlipidemia  7. Vitamin D deficiency  8. Early dementia    Plan:   I spent an extended period of time discussing the case with the patient's 2 daughters Danuta Barcenas and Taylor. They have agreed that their mother would not tolerate nor would she want to move forward with chemotherapy. As a result, they would like to avoid EUS as well. Their goal is for the patient to transfer to the skilled nursing facility with the ultimate plan to transition home with hospice if the patient becomes more independent. They realize that this may not happen. They are obviously frustrated as they have been unable to visit the patient and are having a difficult time communicating with her via the telephone secondary to her profound hearing loss. As the patient's mentation has improved dramatically today and is far more oriented, this did put their minds at ease. They will reach out to the skilled nursing facility today to assess visitation opportunities. Otherwise, the ultimate goal will be for discharge to the skilled nursing facility with no aggressive intervention planned and ultimate plans to transition to hospice. The patient is comfortable currently. I anticipate discharging the patient to the skilled nursing facility tomorrow. Greater than 30 minutes of time was spent with the patient.   This time included extensive conversation with the patient's daughters. More than 50% of my  time was spent at the bedside counseling/coordinating care with the patient and/or family with face to face contact. This time was spent reviewing notes and laboratory data as well as instructing and counseling the patient. Time I spent with the family or surrogate(s) is included only if the patient was incapable of providing the necessary information or participating in medical decisions. I also discussed the differential diagnosis and all of the proposed management plans with the patient and individuals accompanying the patient. Sumeet Lobato requires this high level of physician care and nursing on the C/Telemetry unit due the complexity of decision management and chance of rapid decline or death. Continued cardiac monitoring and higher level of nursing are required. I am readily available for any further decision-making and intervention.        Chrissy Martin DO, F.A.C.O.I.  4/14/2020  8:20 AM

## 2020-04-14 NOTE — PROGRESS NOTES
Blood and [address] [place] [address]  Hematology/Oncology      Patient Name: Loli Soliman  YOB: 1940  PCP: Vipul San DO   Referring Provider:      Reason for Consultation:   Chief Complaint   Patient presents with    Fatigue    Cough    Fever      Subjective:   Confused and complaining of pruritis      History of Present Illness: This pt is a pleasant 69 yo female who presented to the ER with decreased PO intake, cough/SOB and fever. She is confused on exam today and most history is taken from the EMR. In the ER, she was noted to have transaminitis and elevated T bili (mostly direct), TERRELL and leukocytosis with WBC 15.3, improved to 10.2 today and and anemia of 9.7 down to 7.4 today with normal platelet count. All 3 cell lines dropped from 4/9 to 4/10 suggesting a hemodilution. Troponin was significantly elevated and she was diagnosed with NSTEMI. Ferritin was extremely elevated at 1536 and serum iron studies were normal. CT imaging without contrast showed a 1.4 x 1.0 RLL cavitary nodule and multiple, <5 mm, scattered lung nodules. Intra and extrahepatic ductal dilation was also noted without definitive cause. The bile duct was cut off at the ampulla. MRCP of the abdomen is pending. Oncology has been consulted due to concern for neoplastic process. Mammogram 2/20 was negative.     Diagnostic Data:     Past Medical History:   Diagnosis Date    Anxiety     Asthma     Blind one eye     right eye    CAD (coronary artery disease)     Carpal tunnel syndrome, bilateral     COPD (chronic obstructive pulmonary disease) (HCC)     patient denies    Hypertension     IBS (irritable bowel syndrome)     patient denies having IBS    Macular degeneration bilateral    PONV (postoperative nausea and vomiting)     Type II or unspecified type diabetes mellitus without mention of complication, not stated as uncontrolled     UTI (urinary tract infection)     susceptible       Patient Active Problem List

## 2020-04-14 NOTE — PROGRESS NOTES
P Quality Flow/Interdisciplinary Rounds Progress Note        Quality Flow Rounds held on April 14, 2020    Disciplines Attending:  Bedside Nurse, ,  and Nursing Unit Leadership    Yina Fang was admitted on 4/9/2020  6:01 AM    Anticipated Discharge Date:  Expected Discharge Date: 04/13/20    Disposition:    Nabor Score:  Nabor Scale Score: 18    Readmission Risk              Risk of Unplanned Readmission:        9           Discussed patient goal for the day, patient clinical progression, and barriers to discharge.   The following Goal(s) of the Day/Commitment(s) have been identified:  Activity progression, possible DC today      Diego Block  April 14, 2020

## 2020-04-15 VITALS
OXYGEN SATURATION: 96 % | HEIGHT: 59 IN | RESPIRATION RATE: 18 BRPM | DIASTOLIC BLOOD PRESSURE: 69 MMHG | HEART RATE: 79 BPM | WEIGHT: 132 LBS | TEMPERATURE: 97.7 F | SYSTOLIC BLOOD PRESSURE: 145 MMHG | BODY MASS INDEX: 26.61 KG/M2

## 2020-04-15 LAB
ALBUMIN SERPL-MCNC: 2.9 G/DL (ref 3.5–5.2)
ALP BLD-CCNC: 917 U/L (ref 35–104)
ALT SERPL-CCNC: 91 U/L (ref 0–32)
ANION GAP SERPL CALCULATED.3IONS-SCNC: 13 MMOL/L (ref 7–16)
AST SERPL-CCNC: 39 U/L (ref 0–31)
BASOPHILS ABSOLUTE: 0.04 E9/L (ref 0–0.2)
BASOPHILS RELATIVE PERCENT: 0.5 % (ref 0–2)
BILIRUB SERPL-MCNC: 1.8 MG/DL (ref 0–1.2)
BLOOD BANK DISPENSE STATUS: NORMAL
BLOOD BANK DISPENSE STATUS: NORMAL
BLOOD BANK PRODUCT CODE: NORMAL
BLOOD BANK PRODUCT CODE: NORMAL
BPU ID: NORMAL
BPU ID: NORMAL
BUN BLDV-MCNC: 9 MG/DL (ref 8–23)
CALCIUM SERPL-MCNC: 8.7 MG/DL (ref 8.6–10.2)
CHLORIDE BLD-SCNC: 97 MMOL/L (ref 98–107)
CO2: 21 MMOL/L (ref 22–29)
CREAT SERPL-MCNC: 0.9 MG/DL (ref 0.5–1)
DESCRIPTION BLOOD BANK: NORMAL
DESCRIPTION BLOOD BANK: NORMAL
EOSINOPHILS ABSOLUTE: 0.15 E9/L (ref 0.05–0.5)
EOSINOPHILS RELATIVE PERCENT: 1.8 % (ref 0–6)
GFR AFRICAN AMERICAN: >60
GFR NON-AFRICAN AMERICAN: >60 ML/MIN/1.73
GLUCOSE BLD-MCNC: 162 MG/DL (ref 74–99)
HCT VFR BLD CALC: 25.2 % (ref 34–48)
HEMOGLOBIN: 8.2 G/DL (ref 11.5–15.5)
IMMATURE GRANULOCYTES #: 0.14 E9/L
IMMATURE GRANULOCYTES %: 1.7 % (ref 0–5)
INR BLD: 1.1
LYMPHOCYTES ABSOLUTE: 2.44 E9/L (ref 1.5–4)
LYMPHOCYTES RELATIVE PERCENT: 29.2 % (ref 20–42)
MCH RBC QN AUTO: 30.9 PG (ref 26–35)
MCHC RBC AUTO-ENTMCNC: 32.5 % (ref 32–34.5)
MCV RBC AUTO: 95.1 FL (ref 80–99.9)
MONOCYTES ABSOLUTE: 0.94 E9/L (ref 0.1–0.95)
MONOCYTES RELATIVE PERCENT: 11.3 % (ref 2–12)
NEUTROPHILS ABSOLUTE: 4.64 E9/L (ref 1.8–7.3)
NEUTROPHILS RELATIVE PERCENT: 55.5 % (ref 43–80)
PDW BLD-RTO: 15.2 FL (ref 11.5–15)
PLATELET # BLD: 230 E9/L (ref 130–450)
PMV BLD AUTO: 11.3 FL (ref 7–12)
POTASSIUM SERPL-SCNC: 3.7 MMOL/L (ref 3.5–5)
PROTHROMBIN TIME: 12.1 SEC (ref 9.3–12.4)
RBC # BLD: 2.65 E12/L (ref 3.5–5.5)
SODIUM BLD-SCNC: 131 MMOL/L (ref 132–146)
TOTAL PROTEIN: 6.1 G/DL (ref 6.4–8.3)
WBC # BLD: 8.4 E9/L (ref 4.5–11.5)

## 2020-04-15 PROCEDURE — 6370000000 HC RX 637 (ALT 250 FOR IP): Performed by: SURGERY

## 2020-04-15 PROCEDURE — 36415 COLL VENOUS BLD VENIPUNCTURE: CPT

## 2020-04-15 PROCEDURE — 85025 COMPLETE CBC W/AUTO DIFF WBC: CPT

## 2020-04-15 PROCEDURE — 80053 COMPREHEN METABOLIC PANEL: CPT

## 2020-04-15 PROCEDURE — 6360000002 HC RX W HCPCS: Performed by: SURGERY

## 2020-04-15 PROCEDURE — 85610 PROTHROMBIN TIME: CPT

## 2020-04-15 PROCEDURE — 2580000003 HC RX 258: Performed by: SURGERY

## 2020-04-15 PROCEDURE — C9113 INJ PANTOPRAZOLE SODIUM, VIA: HCPCS | Performed by: SURGERY

## 2020-04-15 RX ORDER — PANTOPRAZOLE SODIUM 40 MG/1
40 TABLET, DELAYED RELEASE ORAL
Qty: 60 TABLET | Refills: 0 | Status: SHIPPED | OUTPATIENT
Start: 2020-04-15

## 2020-04-15 RX ORDER — ISOSORBIDE MONONITRATE 30 MG/1
30 TABLET, EXTENDED RELEASE ORAL DAILY
Qty: 30 TABLET | Refills: 3 | Status: SHIPPED | OUTPATIENT
Start: 2020-04-15

## 2020-04-15 RX ADMIN — SODIUM CHLORIDE, PRESERVATIVE FREE 10 ML: 5 INJECTION INTRAVENOUS at 08:31

## 2020-04-15 RX ADMIN — NITROGLYCERIN 0.5 INCH: 20 OINTMENT TOPICAL at 05:44

## 2020-04-15 RX ADMIN — PANTOPRAZOLE SODIUM 40 MG: 40 INJECTION, POWDER, FOR SOLUTION INTRAVENOUS at 08:29

## 2020-04-15 RX ADMIN — METOPROLOL TARTRATE 12.5 MG: 25 TABLET, FILM COATED ORAL at 08:29

## 2020-04-15 NOTE — PROGRESS NOTES
Spoke with patient's daughter Ирина Rey regarding missing hearing aid. Staff does state patient was admitted with a left hearing aid and during the day on Friday the hearing aid went missing. Reaching out to dietary and the linen services to see if they have the earring aid.

## 2020-04-15 NOTE — DISCHARGE SUMMARY
Internal Medicine  Discharge Summary    NAME: Noel Royal  :  1940  MRN:  [id number]  PCP:Roland Sanon DO  ADMITTED: 2020      DISCHARGED: 4/15/20    ADMITTING PHYSICIAN: Shantal Longoria DO    CONSULTANT(S):   IP CONSULT TO CARDIOLOGY  IP CONSULT TO GI  IP CONSULT TO ONCOLOGY  IP CONSULT TO CARDIOLOGY  IP CONSULT TO ONCOLOGY  IP CONSULT TO GENERAL SURGERY  IP CONSULT TO GENERAL SURGERY  IP CONSULT TO PALLIATIVE CARE  IP CONSULT TO HOSPICE     ADMITTING DIAGNOSIS:   Acute respiratory failure with hypoxia ([facility] [J96.01]  Sepsis ([facility] [A41.9]     DISCHARGE DIAGNOSES:   1. Short segment stricture or mass near the ampulla/pancreatic head with concern for an underlying malignant process status post ERCP with biliary stenting   2. Non-ST elevated myocardial infarction with known history of coronary artery disease  3. Acute renal failure upon presentation with complete resolution  4. Multiple lung nodules with concern for a metastatic malignant process  5. Normochromic normocytic anemia  6. Hyperlipidemia  7. Vitamin D deficiency  8. Early dementia    BRIEF HISTORY OF PRESENT ILLNESS:   Noel Royal is a 70 y.o. female who presents to [facility] ER with original complaint of fatigue, cough, fever.     Noel Royal has a past medical history that includes diabetes, hypertension, CAD, blind right eye, COPD, IBS (per EMR).     Blanca Das is a very poor historian secondary to being confused and hard of hearing. Most information taken from ER physician and EMR. During my exam, she is A&Ox1 to self. She states her main complaint is that she has not been eating at home.  She reported to ER triage that she had fatigue, cough, and fever, however, she is currently denying any complaints during my examination including SOB, chest pain, cough, abdominal pain.      In ER she was found to abby TERRELL, increased liver enzymes, WBC 15.3, anemia, along with elevated troponin of 0.29.      ER Course  Upon presentation to the ER, routine labwork was performed which revealed CO2 18, BUN 35, Cr 1.4, glucose 166, trop 0.29, alk phos 1980, , , bilirubin 4.4, WBC 15.3, hemoglobin 9.7. Imaging results are as outlined below in the Imaging section of this note. EKG revealed NSR with PVC. Upon arrival to the ER, patient was 122/53. The patient received 1L NS, azithromycin, rocephin in the emergency room and was admitted to telemetry under the care of Dr. Domenica Martinez and Jorge Jane. LABS[de-identified]  Lab Results   Component Value Date    WBC 8.4 04/15/2020    HGB 8.2 (L) 04/15/2020    HCT 25.2 (L) 04/15/2020     04/15/2020     (L) 04/15/2020    K 3.7 04/15/2020    CL 97 (L) 04/15/2020    CREATININE 0.9 04/15/2020    BUN 9 04/15/2020    CO2 21 (L) 04/15/2020    GLUCOSE 162 (H) 04/15/2020    ALT 91 (H) 04/15/2020    AST 39 (H) 04/15/2020    INR 1.1 04/15/2020     Lab Results   Component Value Date    INR 1.1 04/15/2020    INR 1.0 04/14/2020    INR 1.1 04/13/2020    PROTIME 12.1 04/15/2020    PROTIME 11.7 04/14/2020    PROTIME 12.2 04/13/2020      Lab Results   Component Value Date    TSH 2.410 04/10/2020     Lab Results   Component Value Date    TRIG 263 (H) 04/10/2020    TRIG 335 (H) 07/23/2015     Lab Results   Component Value Date    HDL 44 04/10/2020    HDL 47 07/23/2015     Lab Results   Component Value Date    LDLCALC 154 (H) 04/10/2020    LDLCALC 131 (H) 07/23/2015     Lab Results   Component Value Date    LABA1C 7.7 (H) 04/10/2020       IMAGING:  Ct Abdomen Pelvis Wo Contrast Additional Contrast? None    Result Date: 4/9/2020  EXAMINATION: CT OF THE CHEST WITHOUT CONTRAST; CT OF THE ABDOMEN AND PELVIS WITHOUT CONTRAST 4/9/2020 8:04 am TECHNIQUE: CT of the chest was performed without the administration of intravenous contrast. Multiplanar reformatted images are provided for review.  Dose modulation, iterative reconstruction, and/or weight based adjustment of the mA/kV was utilized to reduce the radiation dose to as low as reasonably achievable.; CT of the abdomen and pelvis was performed without the administration of intravenous contrast. Multiplanar reformatted images are provided for review. Dose modulation, iterative reconstruction, and/or weight based adjustment of the mA/kV was utilized to reduce the radiation dose to as low as reasonably achievable. COMPARISON: CT abdomen and pelvis dated April 29, 2016. No comparison for the chest portion of the exam. HISTORY: ORDERING SYSTEM PROVIDED HISTORY: Evaluate for ground- PROVIDED HISTORY: Reason for exam:-> Evaluate for ground-glass ORDERING SYSTEM PROVIDED HISTORY: Evaluate pancreas TECHNOLOGIST PROVIDED HISTORY: Reason for exam:-> Evaluate pancreas Additional Contrast?->None Cough, fever, and abdominal pain. Jaundice. Initial encounter. FINDINGS: Chest: Mediastinum: Heart size is normal.  No pericardial effusion. Coronary artery atherosclerosis. Thoracic aorta is atherosclerotic. Within the limitations of a noncontrast exam, there is no evidence of hilar adenopathy. No mediastinal adenopathy. Rim calcifications in the right lobe of the thyroid. Lungs/pleura: 1.4 x 1.0 cm right lower lobe nodule with cavity. No pleural effusion, pneumothorax, or evidence of pulmonary edema. There is no focal consolidation. 3 mm nodule right upper lobe (image 33). 3 mm left upper lobe nodule (image 33). 2 mm nodule in the left upper lobe (image 24). 4 mm left lower lobe nodule (image 79). Soft Tissues/Bones: No aggressive lytic or blastic bony lesion. Abdomen/Pelvis: Organs: Intra and extrahepatic ductal dilatation. Unenhanced spleen, pancreas, and adrenal glands are unremarkable. 2.1 cm right upper pole exophytic mass, likely a cyst based on its attenuation. Nonobstructing 1 mm right renal stone. No hydronephrosis or perinephric stranding.   Exophytic hypodense left renal cortical mass, suspected to be a cyst but not well assessed on this noncontrast exam.  Bibasilar level of the pancreatic head correlating to the finding seen on recent CT and MRI imaging. Final image demonstrates a biliary stent in place. Distended biliary ductal system with abrupt caliber change in the region of the pancreatic head correlating to recent CT and MR imaging. A biliary stent was placed. Xr Chest Portable    Result Date: 4/9/2020  EXAMINATION: ONE XRAY VIEW OF THE CHEST 4/9/2020 6:32 am COMPARISON: None HISTORY: ORDERING SYSTEM PROVIDED HISTORY: fever, generalized weakness TECHNOLOGIST PROVIDED HISTORY: Reason for exam:->fever, generalized weakness FINDINGS: Bibasilar airspace opacities with small bilateral pleural effusions. Cardiac silhouette is within normal range. No pneumothorax. Lower cervical spine fusion. 1. Bibasilar airspace opacities. Given the clinical context, differential includes pneumonia, including atypical/viral pneumonia. .     Mri Abdomen Wo Contrast Mrcp    Result Date: 4/11/2020  EXAMINATION: MRI OF THE ABDOMEN WITHOUT CONTRAST AND MRCP 4/10/2020 7:28 am TECHNIQUE: Multiplanar multisequence MRI of the abdomen was performed without the administration of intravenous contrast.  After initial T2 axial and coronal images, thick slab, thin slab and 3D coronal MRCP sequences were obtained without the administration of intravenous contrast.  MIP images are provided for review. COMPARISON: None HISTORY: ORDERING SYSTEM PROVIDED HISTORY: Intra and extrahepatic ductal dilatation TECHNOLOGIST PROVIDED HISTORY: Reason for exam:->Intra and extrahepatic ductal dilatation FINDINGS: Gallbladder: The gallbladder is surgically absent. Bile Ducts: There is severe intrahepatic and extrahepatic bile duct dilatation. The distal common bile duct measures 1.4 cm. There is abrupt narrowing of the distal common bile duct, concerning for stricture or mass near the ampulla/pancreatic head.   There is a focal low-attenuation area in the pancreatic head which measures 1.5 x 1.9 cm,

## 2020-04-16 LAB
BLOOD CULTURE, ROUTINE: ABNORMAL
ORGANISM: ABNORMAL

## 2020-05-01 ENCOUNTER — TELEPHONE (OUTPATIENT)
Dept: ONCOLOGY | Age: 80
End: 2020-05-01

## 2020-05-04 ENCOUNTER — VIRTUAL VISIT (OUTPATIENT)
Dept: CARDIOLOGY CLINIC | Age: 80
End: 2020-05-04
Payer: MEDICARE

## 2020-05-04 ENCOUNTER — TELEPHONE (OUTPATIENT)
Dept: SURGERY | Age: 80
End: 2020-05-04

## 2020-05-04 VITALS
BODY MASS INDEX: 25.5 KG/M2 | SYSTOLIC BLOOD PRESSURE: 149 MMHG | WEIGHT: 126.5 LBS | DIASTOLIC BLOOD PRESSURE: 85 MMHG | HEIGHT: 59 IN | HEART RATE: 75 BPM

## 2020-05-04 PROCEDURE — 99214 OFFICE O/P EST MOD 30 MIN: CPT | Performed by: INTERNAL MEDICINE

## 2020-05-04 RX ORDER — LACTULOSE 20 G/30ML
30 SOLUTION ORAL DAILY
COMMUNITY

## 2020-05-04 RX ORDER — HYDROXYZINE PAMOATE 50 MG/1
50 CAPSULE ORAL PRN
COMMUNITY
End: 2020-06-17

## 2020-05-04 RX ORDER — LUTEIN 6 MG
TABLET ORAL DAILY
COMMUNITY
End: 2020-06-17

## 2020-05-04 NOTE — TELEPHONE ENCOUNTER
Julies daughter Blayne Urbina called. She is asking that if there is anything that needs to be discussed if she can be contacted ([phone])  instead of her mother. They are trying not to let her mom know that she is terminal since she is in a nursing home and can not have any family support at this time.      Electronically signed by Woo Serrano on 5/4/2020 at 9:06 AM

## 2020-05-04 NOTE — PROGRESS NOTES
adenopathy.  No  mediastinal adenopathy.  Rim calcifications in the right lobe of the thyroid. Lungs/pleura: 1.4 x 1.0 cm right lower lobe nodule with cavity.  No pleural  effusion, pneumothorax, or evidence of pulmonary edema.  There is no focal  consolidation. 3 mm nodule right upper lobe (image 33). 3 mm left upper lobe nodule (image 33). 2 mm nodule in the left upper lobe (image 24). 4 mm left lower lobe nodule (image 79). Soft Tissues/Bones: No aggressive lytic or blastic bony lesion.     Abdomen/Pelvis:  Organs: Intra and extrahepatic ductal dilatation.  Unenhanced spleen,  pancreas, and adrenal glands are unremarkable.  2.1 cm right upper pole  exophytic mass, likely a cyst based on its attenuation.  Nonobstructing 1 mm  right renal stone.  No hydronephrosis or perinephric stranding.  Exophytic  hypodense left renal cortical mass, suspected to be a cyst but not well  assessed on this noncontrast exam.  Bibasilar pulmonary atelectasis. GI/Bowel: No evidence of bowel obstruction or free intraperitoneal air. Broad-based umbilical hernia contains a portion of the transverse colon  anterior wall.  No associated evidence of obstruction or bowel wall  thickening.  Appendix is not seen. Pelvis: Copeland catheter decompresses the urinary bladder.  Uterus is absent. No free fluid in the pelvis. Peritoneum/Retroperitoneum: Abdominal aorta is atherosclerotic.  No  retroperitoneal adenopathy. Bones/Soft Tissues: No aggressive lytic or blastic bony lesion.  Generalized  disc bulge at L3-L4, L4-L5, and L5-S1.     Impression:  1. Right lower lobe 1.4 x 1.0 cm nodule with eccentric cavity.  Differential  includes malignancy.  Recommend PET-CT and/or tissue diagnosis and on a  nonemergent basis.   2. Intra and extrahepatic ductal dilatation.  No convincing identifiable  cause on this noncontrast exam; however, there is abrupt cutoff of the common  bile duct distally, near the ampulla of Vater.  Differential includes  ampullary mass.  Consider MRCP for further evaluation. 3. Broad-based anterior abdominal hernia contains the anterior wall of the  transverse colon.  No evidence of obstruction or inflammation. 4. Atherosclerosis and coronary artery disease. 5. Hysterectomy. 6. Multiple sub 5 mm nodules as listed above.  Recommend CT chest follow-up  in 12 months.     Abdomen MRI 04/10/2020: Impression:  1. Severe intrahepatic and extrahepatic bile duct dilatation is seen with  abrupt narrowing of the distal common bile duct, concerning for a  short-segment stricture or mass near the ampulla/pancreatic head.  Further  evaluation with ERCP is recommended. 2. Low-attenuation area is seen within the pancreatic head measuring 1.9 cm. Further evaluation with CT pancreatic mass protocol is recommended to exclude  a pancreatic head mass. 3. Minimal ascites is seen within the upper abdomen.        ERCP 04/12/2020: Impression:  Distended biliary ductal system with abrupt caliber change in the region of  the pancreatic head correlating to recent CT and MR imaging.  A biliary stent  was placed.     2D TTE this admission:   Summary   Technically difficult study - very limited visualization.   Normal left ventricular size and systolic function.   Ejection fraction is visually estimated at 60-65%.   Unable to completely assess diastolic function.   No apparent regional wall motion abnormalities seen, but visualization of   endocardial borders was incomplete.   Mild-moderate left ventricular concentric hypertrophy noted.   Valve not well visualized but no significant abnormalities seen on Doppler   assessment. ASSESSMENT AND PLAN:  1. Elevated troponin: Likely type II demand ischemia given multiple comorbidities (renal insufficiency, anemia), however patient has multiple risk factors for coronary artery disease. Coronary calcifications noted on Chest CT. Echocardiogram reviewed.  They do not wish for any further aggressive work-up or management at this time. 2. Abnormal EKG. 3. Hypertension: 4. Acute kidney injury. 5. Diabetes mellitus type II. 6. Mass near ampulla/pancreatic head: Concerning for malignant process. GI/General Surgery service following. 7. Elevated LFTs, with elevated alkaline phosphatase. 8. Hyperlipidemia:   9. Anemia. 10. Electrolyte abnormalities. 11. Likely dementia. 12. DNR-CC. Eliot Estrella D.O. Cardiologist  Cardiology, [facility]) Physicians    Patient is being evaluated by a Virtual Visit (video visit) encounter to address concerns as mentioned above. A caregiver was present when appropriate. Due to this being a TeleHealth encounter (During NTFPS-06 public health emergency), evaluation of the following organ systems was limited: Vitals/Constitutional/EENT/Resp/CV/GI//MS/Neuro/Skin/Heme-Lymph-Imm. Pursuant to the emergency declaration under the [address] [place] [address] authority and the [employer] and [employer] Act, this Virtual Visit was conducted with patient's (and/or legal guardian's) consent, to reduce the patient's risk of exposure to COVID-19 and provide necessary medical care. The patient (and/or legal guardian) has also been advised to contact this office for worsening conditions or problems, and seek emergency medical treatment and/or call 911 if deemed necessary. Services were provided through a video synchronous discussion virtually to substitute for in-person clinic visit. Patient and provider were located at their individual homes.

## 2020-05-05 ENCOUNTER — VIRTUAL VISIT (OUTPATIENT)
Dept: SURGERY | Age: 80
End: 2020-05-05

## 2020-05-05 PROCEDURE — 99024 POSTOP FOLLOW-UP VISIT: CPT | Performed by: SURGERY

## 2020-06-03 ENCOUNTER — TELEPHONE (OUTPATIENT)
Dept: SURGERY | Age: 80
End: 2020-06-03

## 2020-06-03 NOTE — TELEPHONE ENCOUNTER
Patient is due to have plastic stent exchanged for a metal stent. Call placed to patients daughter to discuss. Sohail Grant not available, message left requesting a return call to discuss next steps.   Electronically signed by Shayy Hernandez on 6/3/20 at 2:00 PM EDT

## 2020-06-17 ENCOUNTER — APPOINTMENT (OUTPATIENT)
Dept: CT IMAGING | Age: 80
DRG: 871 | End: 2020-06-17
Payer: MEDICARE

## 2020-06-17 ENCOUNTER — APPOINTMENT (OUTPATIENT)
Dept: GENERAL RADIOLOGY | Age: 80
DRG: 871 | End: 2020-06-17
Payer: MEDICARE

## 2020-06-17 ENCOUNTER — HOSPITAL ENCOUNTER (INPATIENT)
Age: 80
LOS: 6 days | Discharge: NURSING, REHAB OR LONG-TERM CARE | DRG: 871 | End: 2020-06-23
Attending: EMERGENCY MEDICINE | Admitting: INTERNAL MEDICINE
Payer: MEDICARE

## 2020-06-17 PROBLEM — R41.82 ALTERED MENTAL STATUS: Status: ACTIVE | Noted: 2020-06-17

## 2020-06-17 PROBLEM — N39.0 UTI (URINARY TRACT INFECTION): Status: ACTIVE | Noted: 2020-06-17

## 2020-06-17 LAB
ALBUMIN SERPL-MCNC: 3.2 G/DL (ref 3.5–5.2)
ALP BLD-CCNC: 499 U/L (ref 35–104)
ALT SERPL-CCNC: 289 U/L (ref 0–32)
AMMONIA: 29 UMOL/L (ref 11–51)
ANION GAP SERPL CALCULATED.3IONS-SCNC: 19 MMOL/L (ref 7–16)
APTT: 31 SEC (ref 24.5–35.1)
AST SERPL-CCNC: 118 U/L (ref 0–31)
BACTERIA: ABNORMAL /HPF
BASOPHILS ABSOLUTE: 0 E9/L (ref 0–0.2)
BASOPHILS RELATIVE PERCENT: 0.1 % (ref 0–2)
BILIRUB SERPL-MCNC: 4.4 MG/DL (ref 0–1.2)
BILIRUBIN DIRECT: 4.1 MG/DL (ref 0–0.3)
BILIRUBIN URINE: ABNORMAL
BILIRUBIN, INDIRECT: 0.3 MG/DL (ref 0–1)
BLOOD, URINE: ABNORMAL
BUN BLDV-MCNC: 43 MG/DL (ref 8–23)
BURR CELLS: ABNORMAL
CALCIUM SERPL-MCNC: 8.7 MG/DL (ref 8.6–10.2)
CHLORIDE BLD-SCNC: 90 MMOL/L (ref 98–107)
CLARITY: ABNORMAL
CO2: 20 MMOL/L (ref 22–29)
COLOR: ABNORMAL
CREAT SERPL-MCNC: 2.1 MG/DL (ref 0.5–1)
CRYSTALS, UA: ABNORMAL /HPF
EOSINOPHILS ABSOLUTE: 0 E9/L (ref 0.05–0.5)
EOSINOPHILS RELATIVE PERCENT: 0 % (ref 0–6)
GFR AFRICAN AMERICAN: 28
GFR NON-AFRICAN AMERICAN: 23 ML/MIN/1.73
GLUCOSE BLD-MCNC: 239 MG/DL (ref 74–99)
GLUCOSE URINE: NEGATIVE MG/DL
HCT VFR BLD CALC: 31.7 % (ref 34–48)
HEMOGLOBIN: 10.2 G/DL (ref 11.5–15.5)
INR BLD: 1.4
KETONES, URINE: NEGATIVE MG/DL
LACTIC ACID: 3.5 MMOL/L (ref 0.5–2.2)
LEUKOCYTE ESTERASE, URINE: ABNORMAL
LYMPHOCYTES ABSOLUTE: 1 E9/L (ref 1.5–4)
LYMPHOCYTES RELATIVE PERCENT: 4.3 % (ref 20–42)
MCH RBC QN AUTO: 29.1 PG (ref 26–35)
MCHC RBC AUTO-ENTMCNC: 32.2 % (ref 32–34.5)
MCV RBC AUTO: 90.3 FL (ref 80–99.9)
METAMYELOCYTES RELATIVE PERCENT: 0.9 % (ref 0–1)
METER GLUCOSE: 283 MG/DL (ref 74–99)
MONOCYTES ABSOLUTE: 2.24 E9/L (ref 0.1–0.95)
MONOCYTES RELATIVE PERCENT: 8.7 % (ref 2–12)
NEUTROPHILS ABSOLUTE: 21.66 E9/L (ref 1.8–7.3)
NEUTROPHILS RELATIVE PERCENT: 86.1 % (ref 43–80)
NITRITE, URINE: POSITIVE
PDW BLD-RTO: 13.1 FL (ref 11.5–15)
PH UA: 8.5 (ref 5–9)
PLATELET # BLD: 176 E9/L (ref 130–450)
PMV BLD AUTO: 11.9 FL (ref 7–12)
POIKILOCYTES: ABNORMAL
POLYCHROMASIA: ABNORMAL
POTASSIUM REFLEX MAGNESIUM: 4.1 MMOL/L (ref 3.5–5)
PROTEIN UA: >=300 MG/DL
PROTHROMBIN TIME: 15.5 SEC (ref 9.3–12.4)
RBC # BLD: 3.51 E12/L (ref 3.5–5.5)
RBC UA: >20 /HPF (ref 0–2)
SODIUM BLD-SCNC: 129 MMOL/L (ref 132–146)
SPECIFIC GRAVITY UA: 1.01 (ref 1–1.03)
TOTAL PROTEIN: 7 G/DL (ref 6.4–8.3)
TROPONIN: 0.02 NG/ML (ref 0–0.03)
UROBILINOGEN, URINE: 1 E.U./DL
WBC # BLD: 24.9 E9/L (ref 4.5–11.5)
WBC UA: >20 /HPF (ref 0–5)

## 2020-06-17 PROCEDURE — 83605 ASSAY OF LACTIC ACID: CPT

## 2020-06-17 PROCEDURE — 81001 URINALYSIS AUTO W/SCOPE: CPT

## 2020-06-17 PROCEDURE — 2580000003 HC RX 258: Performed by: PHYSICIAN ASSISTANT

## 2020-06-17 PROCEDURE — 80076 HEPATIC FUNCTION PANEL: CPT

## 2020-06-17 PROCEDURE — 2580000003 HC RX 258: Performed by: STUDENT IN AN ORGANIZED HEALTH CARE EDUCATION/TRAINING PROGRAM

## 2020-06-17 PROCEDURE — 85610 PROTHROMBIN TIME: CPT

## 2020-06-17 PROCEDURE — 71045 X-RAY EXAM CHEST 1 VIEW: CPT

## 2020-06-17 PROCEDURE — 82140 ASSAY OF AMMONIA: CPT

## 2020-06-17 PROCEDURE — 6370000000 HC RX 637 (ALT 250 FOR IP): Performed by: PHYSICIAN ASSISTANT

## 2020-06-17 PROCEDURE — 85025 COMPLETE CBC W/AUTO DIFF WBC: CPT

## 2020-06-17 PROCEDURE — 6360000002 HC RX W HCPCS: Performed by: STUDENT IN AN ORGANIZED HEALTH CARE EDUCATION/TRAINING PROGRAM

## 2020-06-17 PROCEDURE — 87088 URINE BACTERIA CULTURE: CPT

## 2020-06-17 PROCEDURE — 85730 THROMBOPLASTIN TIME PARTIAL: CPT

## 2020-06-17 PROCEDURE — 80048 BASIC METABOLIC PNL TOTAL CA: CPT

## 2020-06-17 PROCEDURE — 1200000000 HC SEMI PRIVATE

## 2020-06-17 PROCEDURE — 70450 CT HEAD/BRAIN W/O DYE: CPT

## 2020-06-17 PROCEDURE — 82962 GLUCOSE BLOOD TEST: CPT

## 2020-06-17 PROCEDURE — 87186 SC STD MICRODIL/AGAR DIL: CPT

## 2020-06-17 PROCEDURE — 99285 EMERGENCY DEPT VISIT HI MDM: CPT

## 2020-06-17 PROCEDURE — 84484 ASSAY OF TROPONIN QUANT: CPT

## 2020-06-17 PROCEDURE — 93005 ELECTROCARDIOGRAM TRACING: CPT | Performed by: STUDENT IN AN ORGANIZED HEALTH CARE EDUCATION/TRAINING PROGRAM

## 2020-06-17 RX ORDER — NICOTINE POLACRILEX 4 MG
15 LOZENGE BUCCAL PRN
Status: DISCONTINUED | OUTPATIENT
Start: 2020-06-17 | End: 2020-06-23 | Stop reason: HOSPADM

## 2020-06-17 RX ORDER — SODIUM CHLORIDE 0.9 % (FLUSH) 0.9 %
10 SYRINGE (ML) INJECTION EVERY 12 HOURS SCHEDULED
Status: DISCONTINUED | OUTPATIENT
Start: 2020-06-17 | End: 2020-06-23 | Stop reason: HOSPADM

## 2020-06-17 RX ORDER — ACETAMINOPHEN 650 MG/1
650 SUPPOSITORY RECTAL EVERY 6 HOURS PRN
Status: DISCONTINUED | OUTPATIENT
Start: 2020-06-17 | End: 2020-06-23 | Stop reason: HOSPADM

## 2020-06-17 RX ORDER — ONDANSETRON 2 MG/ML
4 INJECTION INTRAMUSCULAR; INTRAVENOUS EVERY 6 HOURS PRN
Status: DISCONTINUED | OUTPATIENT
Start: 2020-06-17 | End: 2020-06-23 | Stop reason: HOSPADM

## 2020-06-17 RX ORDER — PANTOPRAZOLE SODIUM 40 MG/1
40 TABLET, DELAYED RELEASE ORAL
Status: DISCONTINUED | OUTPATIENT
Start: 2020-06-18 | End: 2020-06-23 | Stop reason: HOSPADM

## 2020-06-17 RX ORDER — ISOSORBIDE MONONITRATE 30 MG/1
30 TABLET, EXTENDED RELEASE ORAL DAILY
Status: DISCONTINUED | OUTPATIENT
Start: 2020-06-18 | End: 2020-06-23 | Stop reason: HOSPADM

## 2020-06-17 RX ORDER — DEXTROSE MONOHYDRATE 50 MG/ML
100 INJECTION, SOLUTION INTRAVENOUS PRN
Status: DISCONTINUED | OUTPATIENT
Start: 2020-06-17 | End: 2020-06-23 | Stop reason: HOSPADM

## 2020-06-17 RX ORDER — DEXTROSE MONOHYDRATE 25 G/50ML
12.5 INJECTION, SOLUTION INTRAVENOUS PRN
Status: DISCONTINUED | OUTPATIENT
Start: 2020-06-17 | End: 2020-06-23 | Stop reason: HOSPADM

## 2020-06-17 RX ORDER — 0.9 % SODIUM CHLORIDE 0.9 %
1000 INTRAVENOUS SOLUTION INTRAVENOUS ONCE
Status: COMPLETED | OUTPATIENT
Start: 2020-06-17 | End: 2020-06-17

## 2020-06-17 RX ORDER — PROMETHAZINE HYDROCHLORIDE 25 MG/1
12.5 TABLET ORAL EVERY 6 HOURS PRN
Status: DISCONTINUED | OUTPATIENT
Start: 2020-06-17 | End: 2020-06-23 | Stop reason: HOSPADM

## 2020-06-17 RX ORDER — LACTULOSE 10 G/15ML
30 SOLUTION ORAL DAILY
Status: DISCONTINUED | OUTPATIENT
Start: 2020-06-18 | End: 2020-06-23 | Stop reason: HOSPADM

## 2020-06-17 RX ORDER — SODIUM CHLORIDE 0.9 % (FLUSH) 0.9 %
10 SYRINGE (ML) INJECTION PRN
Status: DISCONTINUED | OUTPATIENT
Start: 2020-06-17 | End: 2020-06-23 | Stop reason: HOSPADM

## 2020-06-17 RX ORDER — ASPIRIN 81 MG/1
81 TABLET ORAL DAILY
Status: DISCONTINUED | OUTPATIENT
Start: 2020-06-18 | End: 2020-06-23 | Stop reason: HOSPADM

## 2020-06-17 RX ORDER — ACETAMINOPHEN 325 MG/1
650 TABLET ORAL EVERY 6 HOURS PRN
Status: DISCONTINUED | OUTPATIENT
Start: 2020-06-17 | End: 2020-06-23 | Stop reason: HOSPADM

## 2020-06-17 RX ORDER — ATORVASTATIN CALCIUM 80 MG/1
80 TABLET, FILM COATED ORAL NIGHTLY
Status: DISCONTINUED | OUTPATIENT
Start: 2020-06-17 | End: 2020-06-19

## 2020-06-17 RX ORDER — SODIUM CHLORIDE 9 MG/ML
INJECTION, SOLUTION INTRAVENOUS CONTINUOUS
Status: DISCONTINUED | OUTPATIENT
Start: 2020-06-17 | End: 2020-06-20

## 2020-06-17 RX ADMIN — SODIUM CHLORIDE: 9 INJECTION, SOLUTION INTRAVENOUS at 22:51

## 2020-06-17 RX ADMIN — INSULIN LISPRO 2 UNITS: 100 INJECTION, SOLUTION INTRAVENOUS; SUBCUTANEOUS at 22:56

## 2020-06-17 RX ADMIN — SODIUM CHLORIDE, PRESERVATIVE FREE 10 ML: 5 INJECTION INTRAVENOUS at 22:52

## 2020-06-17 RX ADMIN — CEFTRIAXONE SODIUM 1 G: 1 INJECTION, POWDER, FOR SOLUTION INTRAMUSCULAR; INTRAVENOUS at 20:23

## 2020-06-17 RX ADMIN — SODIUM CHLORIDE 1000 ML: 9 INJECTION, SOLUTION INTRAVENOUS at 20:24

## 2020-06-17 ASSESSMENT — PAIN SCALES - WONG BAKER: WONGBAKER_NUMERICALRESPONSE: 0

## 2020-06-18 ENCOUNTER — APPOINTMENT (OUTPATIENT)
Dept: CT IMAGING | Age: 80
DRG: 871 | End: 2020-06-18
Payer: MEDICARE

## 2020-06-18 PROBLEM — N12 PYELONEPHRITIS: Status: ACTIVE | Noted: 2020-06-18

## 2020-06-18 PROBLEM — N17.9 ACUTE KIDNEY FAILURE (HCC): Status: ACTIVE | Noted: 2020-06-18

## 2020-06-18 PROBLEM — G93.41 METABOLIC ENCEPHALOPATHY: Status: ACTIVE | Noted: 2020-06-18

## 2020-06-18 LAB
ANION GAP SERPL CALCULATED.3IONS-SCNC: 18 MMOL/L (ref 7–16)
BASOPHILS ABSOLUTE: 0 E9/L (ref 0–0.2)
BASOPHILS RELATIVE PERCENT: 0.3 % (ref 0–2)
BUN BLDV-MCNC: 47 MG/DL (ref 8–23)
BURR CELLS: ABNORMAL
CALCIUM SERPL-MCNC: 8.6 MG/DL (ref 8.6–10.2)
CHLORIDE BLD-SCNC: 94 MMOL/L (ref 98–107)
CO2: 17 MMOL/L (ref 22–29)
CREAT SERPL-MCNC: 1.7 MG/DL (ref 0.5–1)
EKG ATRIAL RATE: 90 BPM
EKG P AXIS: 54 DEGREES
EKG P-R INTERVAL: 162 MS
EKG Q-T INTERVAL: 364 MS
EKG QRS DURATION: 68 MS
EKG QTC CALCULATION (BAZETT): 445 MS
EKG R AXIS: 10 DEGREES
EKG T AXIS: 71 DEGREES
EKG VENTRICULAR RATE: 90 BPM
EOSINOPHILS ABSOLUTE: 0 E9/L (ref 0.05–0.5)
EOSINOPHILS RELATIVE PERCENT: 0 % (ref 0–6)
GFR AFRICAN AMERICAN: 35
GFR NON-AFRICAN AMERICAN: 29 ML/MIN/1.73
GLUCOSE BLD-MCNC: 244 MG/DL (ref 74–99)
HCT VFR BLD CALC: 33.6 % (ref 34–48)
HEMOGLOBIN: 10.8 G/DL (ref 11.5–15.5)
LYMPHOCYTES ABSOLUTE: 1.17 E9/L (ref 1.5–4)
LYMPHOCYTES RELATIVE PERCENT: 4.3 % (ref 20–42)
MCH RBC QN AUTO: 29.2 PG (ref 26–35)
MCHC RBC AUTO-ENTMCNC: 32.1 % (ref 32–34.5)
MCV RBC AUTO: 90.8 FL (ref 80–99.9)
METAMYELOCYTES RELATIVE PERCENT: 1.7 % (ref 0–1)
METER GLUCOSE: 196 MG/DL (ref 74–99)
METER GLUCOSE: 212 MG/DL (ref 74–99)
METER GLUCOSE: 227 MG/DL (ref 74–99)
METER GLUCOSE: 230 MG/DL (ref 74–99)
MONOCYTES ABSOLUTE: 1.46 E9/L (ref 0.1–0.95)
MONOCYTES RELATIVE PERCENT: 5.2 % (ref 2–12)
NEUTROPHILS ABSOLUTE: 26.37 E9/L (ref 1.8–7.3)
NEUTROPHILS RELATIVE PERCENT: 88.7 % (ref 43–80)
PDW BLD-RTO: 13.2 FL (ref 11.5–15)
PLATELET # BLD: 172 E9/L (ref 130–450)
PMV BLD AUTO: 11.8 FL (ref 7–12)
POIKILOCYTES: ABNORMAL
POLYCHROMASIA: ABNORMAL
POTASSIUM REFLEX MAGNESIUM: 4.7 MMOL/L (ref 3.5–5)
RBC # BLD: 3.7 E12/L (ref 3.5–5.5)
SODIUM BLD-SCNC: 129 MMOL/L (ref 132–146)
WBC # BLD: 29.3 E9/L (ref 4.5–11.5)

## 2020-06-18 PROCEDURE — 6360000002 HC RX W HCPCS: Performed by: INTERNAL MEDICINE

## 2020-06-18 PROCEDURE — 2700000000 HC OXYGEN THERAPY PER DAY

## 2020-06-18 PROCEDURE — 80048 BASIC METABOLIC PNL TOTAL CA: CPT

## 2020-06-18 PROCEDURE — 97530 THERAPEUTIC ACTIVITIES: CPT

## 2020-06-18 PROCEDURE — 6360000002 HC RX W HCPCS: Performed by: PHYSICIAN ASSISTANT

## 2020-06-18 PROCEDURE — 97166 OT EVAL MOD COMPLEX 45 MIN: CPT

## 2020-06-18 PROCEDURE — 82962 GLUCOSE BLOOD TEST: CPT

## 2020-06-18 PROCEDURE — 1200000000 HC SEMI PRIVATE

## 2020-06-18 PROCEDURE — 2580000003 HC RX 258: Performed by: INTERNAL MEDICINE

## 2020-06-18 PROCEDURE — 93010 ELECTROCARDIOGRAM REPORT: CPT | Performed by: INTERNAL MEDICINE

## 2020-06-18 PROCEDURE — 99223 1ST HOSP IP/OBS HIGH 75: CPT | Performed by: NURSE PRACTITIONER

## 2020-06-18 PROCEDURE — 36415 COLL VENOUS BLD VENIPUNCTURE: CPT

## 2020-06-18 PROCEDURE — 85025 COMPLETE CBC W/AUTO DIFF WBC: CPT

## 2020-06-18 PROCEDURE — 74176 CT ABD & PELVIS W/O CONTRAST: CPT

## 2020-06-18 PROCEDURE — 6370000000 HC RX 637 (ALT 250 FOR IP): Performed by: PHYSICIAN ASSISTANT

## 2020-06-18 PROCEDURE — 97162 PT EVAL MOD COMPLEX 30 MIN: CPT

## 2020-06-18 RX ADMIN — INSULIN LISPRO 2 UNITS: 100 INJECTION, SOLUTION INTRAVENOUS; SUBCUTANEOUS at 13:51

## 2020-06-18 RX ADMIN — PANTOPRAZOLE SODIUM 40 MG: 40 TABLET, DELAYED RELEASE ORAL at 17:02

## 2020-06-18 RX ADMIN — ISOSORBIDE MONONITRATE 30 MG: 30 TABLET ORAL at 13:51

## 2020-06-18 RX ADMIN — INSULIN LISPRO 1 UNITS: 100 INJECTION, SOLUTION INTRAVENOUS; SUBCUTANEOUS at 20:15

## 2020-06-18 RX ADMIN — ENOXAPARIN SODIUM 30 MG: 30 INJECTION SUBCUTANEOUS at 11:00

## 2020-06-18 RX ADMIN — INSULIN LISPRO 1 UNITS: 100 INJECTION, SOLUTION INTRAVENOUS; SUBCUTANEOUS at 17:03

## 2020-06-18 RX ADMIN — ASPIRIN 81 MG: 81 TABLET, COATED ORAL at 13:51

## 2020-06-18 RX ADMIN — MEROPENEM 1 G: 1 INJECTION, POWDER, FOR SOLUTION INTRAVENOUS at 13:51

## 2020-06-18 RX ADMIN — MEROPENEM 1 G: 1 INJECTION, POWDER, FOR SOLUTION INTRAVENOUS at 20:14

## 2020-06-18 RX ADMIN — ATORVASTATIN CALCIUM 80 MG: 80 TABLET, FILM COATED ORAL at 20:13

## 2020-06-18 RX ADMIN — LACTULOSE 20 G: 20 SOLUTION ORAL at 11:00

## 2020-06-18 RX ADMIN — METOPROLOL TARTRATE 12.5 MG: 25 TABLET, FILM COATED ORAL at 20:14

## 2020-06-18 RX ADMIN — METOPROLOL TARTRATE 12.5 MG: 25 TABLET, FILM COATED ORAL at 13:57

## 2020-06-18 ASSESSMENT — PAIN SCALES - WONG BAKER: WONGBAKER_NUMERICALRESPONSE: 0

## 2020-06-18 NOTE — CARE COORDINATION
Admitted from [facility] for [address] [place] [address]. She is a bedhold. She may return. Ambulance form and envelope in soft chart.  Darya Main RN [phone]

## 2020-06-18 NOTE — H&P
Nickel; Other; Sulfa antibiotics; Vicodin [hydrocodone-acetaminophen]; Morphine; Penicillins; Tape Luiz Hammers tape]; and Versed [midazolam]    Social History:    reports that she has quit smoking. She uses smokeless tobacco. She reports that she does not drink alcohol or use drugs. Family History:   Unable to obtain    REVIEW OF SYSTEMS:  As above in the HPI, otherwise negative    PHYSICAL EXAM:    Vitals:  /80   Pulse 99   Temp 98 °F (36.7 °C) (Temporal)   Resp 24   Ht 5' (1.524 m)   Wt 146 lb (66.2 kg)   SpO2 99%   BMI 28.51 kg/m²     General appearance: Nontoxic but obviously totally confused female lying in bed in no acute distress  HEENT: AT/NC, MMM  Neck: FROM, supple  Lungs: Clear to auscultation anteriorly  CV: RRR, no MRGs  Abdomen: Soft, non-tender; no masses or HSM, +BS  : Left CVA tenderness  Extremities: No peripheral edema or digital cyanosis  Skin: no rash, lesions or ulcers  Psych: Calm and cooperative  Neuro: Awake, opens eyes to voice, does not track with eyes, obviously confused and delirious    LABS:  All labs reviewed. Of note:  CBC:   Lab Results   Component Value Date    WBC 29.3 06/18/2020    RBC 3.70 06/18/2020    HGB 10.8 06/18/2020    HCT 33.6 06/18/2020    MCV 90.8 06/18/2020    MCH 29.2 06/18/2020    MCHC 32.1 06/18/2020    RDW 13.2 06/18/2020     06/18/2020    MPV 11.8 06/18/2020     CMP:    Lab Results   Component Value Date     06/18/2020    K 4.7 06/18/2020    CL 94 06/18/2020    CO2 17 06/18/2020    BUN 47 06/18/2020    CREATININE 1.7 06/18/2020    GFRAA 35 06/18/2020    LABGLOM 29 06/18/2020    GLUCOSE 244 06/18/2020    GLUCOSE 208 03/23/2012    PROT 7.0 06/17/2020    LABALBU 3.2 06/17/2020    CALCIUM 8.6 06/18/2020    BILITOT 4.4 06/17/2020    ALKPHOS 499 06/17/2020     06/17/2020     06/17/2020       Imaging:  I've personally reviewed the patient's CT head:     Stable sclerosis meningioma on the right. Mild atrophy.  Nothing

## 2020-06-18 NOTE — DISCHARGE INSTR - COC
Continuity of Care Form    Patient Name: Sacha Flores   :  1940  MRN:  [id number]    [facility] date:  2020  Discharge date:  20    Code Status Order: [facility]   Advance Directives:   [facility] Documentation     Date/Time Healthcare Directive Type of Healthcare Directive Copy in [address] Agent's Name Healthcare Agent's Phone Number    [phone]  Yes, patient has an advance directive for healthcare treatment  --  --  --  --  --          Admitting Physician:  Nolon Snellen, MD  PCP: Alessia Og DO    Discharging Nurse: [address] [place] [address] Unit/Room#: 8380/6285-Y  Discharging Unit Phone Number: [phone]    Emergency Contact:   Extended Emergency Contact Information  Primary Emergency Contact: Kimberly Clemens  Address: [address] [place] [address] Phone: [phone]  Mobile Phone: [phone]  Relation: Child  Preferred language: English   needed?  No  Secondary Emergency Contact: Hien Jeffers  Mobile Phone: [phone]  Relation: Child    Past Surgical History:  Past Surgical History:   Procedure Laterality Date    ABDOMINAL AORTIC ANEURYSM REPAIR      APPENDECTOMY      CARDIAC SURGERY      abdominal aortic bypass    CARPAL TUNNEL RELEASE      right    CARPAL TUNNEL RELEASE  3/27/12    right    CARPAL TUNNEL RELEASE Left 2016    CERVICAL DISCECTOMY      CORONARY ARTERY BYPASS GRAFT      patient denies having heart surgery  9/9/15    ERCP N/A 2020    ERCP SPHINCTER/PAPILLOTOMY performed by Hanh Hines MD at [address] [place] [address] ERCP N/A 2020    ERCP STENT INSERTION performed by Hanh Hines MD at [address] [place] [address] ERCP N/A 2020    ERCP DIAGNOSTIC WITH BRUSHINGS performed by Hanh Hines MD at [facility] Left     cataract extraction both eye    HYSTERECTOMY      LAPAROSCOPY      X6    NERVE BLOCK  2011 signed by SIVA Edmonds on 6/18/2020 at 9:24 AM      PHYSICIAN SECTION    Prognosis: Good    Condition at Discharge: Stable    Rehab Potential (if transferring to Rehab): Good    Recommended Labs or Other Treatments After Discharge:   CMP + CBC on 6/29  Due to pressure ulcer and incontinence/reduced immobility, leave Copeland catheter in place for now. As soon as she is able to use bedpan or ulcers improving, please remove Copeland. I would not recommend it stay in place longer than 1 additional week. Please get wound care consult at SNF for pressure ulcers    Physician Certification: I certify the above information and transfer of Alvarado Alejandro  is necessary for the continuing treatment of the diagnosis listed and that she requires [facility] for less 30 days.      Update Admission H&P: No change in H&P    PHYSICIAN SIGNATURE:  Electronically signed by Dominga Jorge MD on 6/23/20 at 10:25 AM EDT

## 2020-06-18 NOTE — ED NOTES
Daughter only wants comfort care measures only.  Floor called and aware of DNR-CC     Gerri Brown RN  06/17/20 1946

## 2020-06-18 NOTE — CONSULTS
the [facility] and was unable. Discussion held regarding noted physician progress notes of elevated tumor markers, suspected pancreatic head mass concerning for pancreatic cancer and multiple lung nodules suspicious for metastatic disease. Both daughters understand and had conversation with Dr. Maco Lane on Saturday regarding the ERCP and stent placement. Extensive discussion held with both daughters to review DNR-CCA vs DNR-CC and further goals of care. Ambika Velarde states that at this time she would not be able to care for her mom in her home unless she becomes stronger and would like her to go to a facility for therapy, however they are both in agreement that their mom would not want aggressive treatment regardless of the outcome of the cytology results. Both daughters are actively employed and working outside the home. The daughters share HCPOA. They are tearful as the patient's change in condition was sudden and now they are being told that she may have less than six months. Understanding DNR pamphlet sent to the daughter Ambika Velarde for she and her sister to review. Call back number for Palliative Medicine given for follow up regarding CODE status. [phone]  Call back from daughters Ambika Velarde and Taylor. They would like to change the patient's CODE status to [facility] and are receptive to a Hospice referral for information only at this time. Family choiced for hospice services and they have chosen [facility]. Their wishes are for the patient to continue with therapy for some quality of life being able to sit up and even transfer, \"maybe that will make her happy and feel a little better\", and then she can be transferred to a SNF. Daughters informed that the CODE STATUS will be changed today and an order will be place for a Hospice informational referral.  Call to nurse's station, floor nurse unavailable for update at present.   Call to [facility] intake department for placement of the referral.  Palliative This report was completed using [id number] voiced recognition software. Every effort has been made to ensure accuracy; however, inadvertent computerized transcription errors may be present.

## 2020-06-19 ENCOUNTER — APPOINTMENT (OUTPATIENT)
Dept: GENERAL RADIOLOGY | Age: 80
DRG: 871 | End: 2020-06-19
Payer: MEDICARE

## 2020-06-19 ENCOUNTER — ANESTHESIA (OUTPATIENT)
Dept: ENDOSCOPY | Age: 80
DRG: 871 | End: 2020-06-19
Payer: MEDICARE

## 2020-06-19 ENCOUNTER — ANESTHESIA EVENT (OUTPATIENT)
Dept: ENDOSCOPY | Age: 80
DRG: 871 | End: 2020-06-19
Payer: MEDICARE

## 2020-06-19 VITALS — SYSTOLIC BLOOD PRESSURE: 136 MMHG | OXYGEN SATURATION: 89 % | DIASTOLIC BLOOD PRESSURE: 71 MMHG

## 2020-06-19 LAB
ALBUMIN SERPL-MCNC: 2.7 G/DL (ref 3.5–5.2)
ALP BLD-CCNC: 483 U/L (ref 35–104)
ALT SERPL-CCNC: 193 U/L (ref 0–32)
ANION GAP SERPL CALCULATED.3IONS-SCNC: 19 MMOL/L (ref 7–16)
ANISOCYTOSIS: ABNORMAL
AST SERPL-CCNC: 94 U/L (ref 0–31)
BASOPHILS ABSOLUTE: 0 E9/L (ref 0–0.2)
BASOPHILS RELATIVE PERCENT: 0.1 % (ref 0–2)
BILIRUB SERPL-MCNC: 3.8 MG/DL (ref 0–1.2)
BUN BLDV-MCNC: 47 MG/DL (ref 8–23)
BURR CELLS: ABNORMAL
CALCIUM SERPL-MCNC: 8.7 MG/DL (ref 8.6–10.2)
CHLORIDE BLD-SCNC: 99 MMOL/L (ref 98–107)
CO2: 16 MMOL/L (ref 22–29)
CREAT SERPL-MCNC: 1.2 MG/DL (ref 0.5–1)
EOSINOPHILS ABSOLUTE: 0 E9/L (ref 0.05–0.5)
EOSINOPHILS RELATIVE PERCENT: 0 % (ref 0–6)
GFR AFRICAN AMERICAN: 53
GFR NON-AFRICAN AMERICAN: 43 ML/MIN/1.73
GLUCOSE BLD-MCNC: 204 MG/DL (ref 74–99)
HCT VFR BLD CALC: 30.4 % (ref 34–48)
HEMOGLOBIN: 9.8 G/DL (ref 11.5–15.5)
LYMPHOCYTES ABSOLUTE: 1.5 E9/L (ref 1.5–4)
LYMPHOCYTES RELATIVE PERCENT: 7 % (ref 20–42)
MCH RBC QN AUTO: 28.7 PG (ref 26–35)
MCHC RBC AUTO-ENTMCNC: 32.2 % (ref 32–34.5)
MCV RBC AUTO: 88.9 FL (ref 80–99.9)
METER GLUCOSE: 175 MG/DL (ref 74–99)
METER GLUCOSE: 181 MG/DL (ref 74–99)
METER GLUCOSE: 194 MG/DL (ref 74–99)
METER GLUCOSE: 230 MG/DL (ref 74–99)
MONOCYTES ABSOLUTE: 0.43 E9/L (ref 0.1–0.95)
MONOCYTES RELATIVE PERCENT: 1.7 % (ref 2–12)
NEUTROPHILS ABSOLUTE: 19.47 E9/L (ref 1.8–7.3)
NEUTROPHILS RELATIVE PERCENT: 91.3 % (ref 43–80)
PDW BLD-RTO: 13.1 FL (ref 11.5–15)
PLATELET # BLD: 243 E9/L (ref 130–450)
PMV BLD AUTO: 11.8 FL (ref 7–12)
POIKILOCYTES: ABNORMAL
POLYCHROMASIA: ABNORMAL
POTASSIUM SERPL-SCNC: 3.2 MMOL/L (ref 3.5–5)
RBC # BLD: 3.42 E12/L (ref 3.5–5.5)
SODIUM BLD-SCNC: 134 MMOL/L (ref 132–146)
SPHEROCYTES: ABNORMAL
TEAR DROP CELLS: ABNORMAL
TOTAL PROTEIN: 6.6 G/DL (ref 6.4–8.3)
WBC # BLD: 21.4 E9/L (ref 4.5–11.5)

## 2020-06-19 PROCEDURE — 2500000003 HC RX 250 WO HCPCS: Performed by: NURSE ANESTHETIST, CERTIFIED REGISTERED

## 2020-06-19 PROCEDURE — 88305 TISSUE EXAM BY PATHOLOGIST: CPT

## 2020-06-19 PROCEDURE — 3609015200 HC ERCP REMOVE CALCULI/DEBRIS BILIARY/PANCREAS DUCT: Performed by: SURGERY

## 2020-06-19 PROCEDURE — 3609015100 HC ERCP STENT PLACEMENT BILIARY/PANCREATIC DUCT: Performed by: SURGERY

## 2020-06-19 PROCEDURE — C1874 STENT, COATED/COV W/DEL SYS: HCPCS | Performed by: SURGERY

## 2020-06-19 PROCEDURE — 7100000001 HC PACU RECOVERY - ADDTL 15 MIN: Performed by: SURGERY

## 2020-06-19 PROCEDURE — 6360000004 HC RX CONTRAST MEDICATION: Performed by: SURGERY

## 2020-06-19 PROCEDURE — 43264 ERCP REMOVE DUCT CALCULI: CPT | Performed by: SURGERY

## 2020-06-19 PROCEDURE — 3609014300 HC ERCP BALLOON DILATE BILIARY/PANC DUCT/AMPULLA EA: Performed by: SURGERY

## 2020-06-19 PROCEDURE — 6360000002 HC RX W HCPCS: Performed by: PHYSICIAN ASSISTANT

## 2020-06-19 PROCEDURE — 6360000002 HC RX W HCPCS: Performed by: SURGERY

## 2020-06-19 PROCEDURE — 6370000000 HC RX 637 (ALT 250 FOR IP): Performed by: SURGERY

## 2020-06-19 PROCEDURE — 3609018800 HC ERCP DX COLLECTION SPECIMEN BRUSHING/WASHING: Performed by: SURGERY

## 2020-06-19 PROCEDURE — 3609020800 HC EGD W/EUS FNA: Performed by: SURGERY

## 2020-06-19 PROCEDURE — 3700000001 HC ADD 15 MINUTES (ANESTHESIA): Performed by: SURGERY

## 2020-06-19 PROCEDURE — 88112 CYTOPATH CELL ENHANCE TECH: CPT

## 2020-06-19 PROCEDURE — C1769 GUIDE WIRE: HCPCS | Performed by: SURGERY

## 2020-06-19 PROCEDURE — 43242 EGD US FINE NEEDLE BX/ASPIR: CPT | Performed by: SURGERY

## 2020-06-19 PROCEDURE — 6360000002 HC RX W HCPCS: Performed by: INTERNAL MEDICINE

## 2020-06-19 PROCEDURE — 3209999900 FLUORO FOR SURGICAL PROCEDURES

## 2020-06-19 PROCEDURE — C1753 CATH, INTRAVAS ULTRASOUND: HCPCS | Performed by: SURGERY

## 2020-06-19 PROCEDURE — 1200000000 HC SEMI PRIVATE

## 2020-06-19 PROCEDURE — 43261 ENDO CHOLANGIOPANCREATOGRAPH: CPT | Performed by: SURGERY

## 2020-06-19 PROCEDURE — 2709999900 HC NON-CHARGEABLE SUPPLY: Performed by: SURGERY

## 2020-06-19 PROCEDURE — 2580000003 HC RX 258: Performed by: SURGERY

## 2020-06-19 PROCEDURE — 2720000010 HC SURG SUPPLY STERILE: Performed by: SURGERY

## 2020-06-19 PROCEDURE — 0FBG8ZX EXCISION OF PANCREAS, VIA NATURAL OR ARTIFICIAL OPENING ENDOSCOPIC, DIAGNOSTIC: ICD-10-PCS | Performed by: SURGERY

## 2020-06-19 PROCEDURE — 6370000000 HC RX 637 (ALT 250 FOR IP): Performed by: PHYSICIAN ASSISTANT

## 2020-06-19 PROCEDURE — 88173 CYTOPATH EVAL FNA REPORT: CPT

## 2020-06-19 PROCEDURE — 43276 ERCP STENT EXCHANGE W/DILATE: CPT | Performed by: SURGERY

## 2020-06-19 PROCEDURE — 2700000000 HC OXYGEN THERAPY PER DAY

## 2020-06-19 PROCEDURE — 82962 GLUCOSE BLOOD TEST: CPT

## 2020-06-19 PROCEDURE — 2580000003 HC RX 258: Performed by: NURSE ANESTHETIST, CERTIFIED REGISTERED

## 2020-06-19 PROCEDURE — 0FC98ZZ EXTIRPATION OF MATTER FROM COMMON BILE DUCT, VIA NATURAL OR ARTIFICIAL OPENING ENDOSCOPIC: ICD-10-PCS | Performed by: SURGERY

## 2020-06-19 PROCEDURE — 7100000000 HC PACU RECOVERY - FIRST 15 MIN: Performed by: SURGERY

## 2020-06-19 PROCEDURE — 3609015000 HC ERCP REMOVE FOREIGN BODY/STENT BILIARY/PANC DUCT: Performed by: SURGERY

## 2020-06-19 PROCEDURE — 3700000000 HC ANESTHESIA ATTENDED CARE: Performed by: SURGERY

## 2020-06-19 PROCEDURE — 36415 COLL VENOUS BLD VENIPUNCTURE: CPT

## 2020-06-19 PROCEDURE — 80053 COMPREHEN METABOLIC PANEL: CPT

## 2020-06-19 PROCEDURE — 6360000002 HC RX W HCPCS: Performed by: NURSE ANESTHETIST, CERTIFIED REGISTERED

## 2020-06-19 PROCEDURE — 2580000003 HC RX 258: Performed by: INTERNAL MEDICINE

## 2020-06-19 PROCEDURE — 85025 COMPLETE CBC W/AUTO DIFF WBC: CPT

## 2020-06-19 PROCEDURE — 99222 1ST HOSP IP/OBS MODERATE 55: CPT | Performed by: SURGERY

## 2020-06-19 DEVICE — STENT SYSTEM RMV
Type: IMPLANTABLE DEVICE | Site: PANCREAS | Status: FUNCTIONAL
Brand: WALLFLEX BILIARY

## 2020-06-19 RX ORDER — MORPHINE SULFATE 2 MG/ML
2 INJECTION, SOLUTION INTRAMUSCULAR; INTRAVENOUS EVERY 5 MIN PRN
Status: DISCONTINUED | OUTPATIENT
Start: 2020-06-19 | End: 2020-06-19 | Stop reason: HOSPADM

## 2020-06-19 RX ORDER — PROMETHAZINE HYDROCHLORIDE 25 MG/ML
6.25 INJECTION, SOLUTION INTRAMUSCULAR; INTRAVENOUS EVERY 10 MIN PRN
Status: DISCONTINUED | OUTPATIENT
Start: 2020-06-19 | End: 2020-06-19 | Stop reason: HOSPADM

## 2020-06-19 RX ORDER — FENTANYL CITRATE 50 UG/ML
INJECTION, SOLUTION INTRAMUSCULAR; INTRAVENOUS PRN
Status: DISCONTINUED | OUTPATIENT
Start: 2020-06-19 | End: 2020-06-19 | Stop reason: SDUPTHER

## 2020-06-19 RX ORDER — PETROLATUM 42 G/100G
OINTMENT TOPICAL 3 TIMES DAILY PRN
Status: DISCONTINUED | OUTPATIENT
Start: 2020-06-19 | End: 2020-06-23 | Stop reason: HOSPADM

## 2020-06-19 RX ORDER — MEPERIDINE HYDROCHLORIDE 25 MG/ML
12.5 INJECTION INTRAMUSCULAR; INTRAVENOUS; SUBCUTANEOUS EVERY 5 MIN PRN
Status: DISCONTINUED | OUTPATIENT
Start: 2020-06-19 | End: 2020-06-19 | Stop reason: HOSPADM

## 2020-06-19 RX ORDER — MORPHINE SULFATE 2 MG/ML
1 INJECTION, SOLUTION INTRAMUSCULAR; INTRAVENOUS EVERY 5 MIN PRN
Status: DISCONTINUED | OUTPATIENT
Start: 2020-06-19 | End: 2020-06-19 | Stop reason: HOSPADM

## 2020-06-19 RX ORDER — LIDOCAINE HYDROCHLORIDE 20 MG/ML
INJECTION, SOLUTION INTRAVENOUS PRN
Status: DISCONTINUED | OUTPATIENT
Start: 2020-06-19 | End: 2020-06-19 | Stop reason: SDUPTHER

## 2020-06-19 RX ORDER — HYDROCODONE BITARTRATE AND ACETAMINOPHEN 5; 325 MG/1; MG/1
2 TABLET ORAL PRN
Status: DISCONTINUED | OUTPATIENT
Start: 2020-06-19 | End: 2020-06-19 | Stop reason: HOSPADM

## 2020-06-19 RX ORDER — PROPOFOL 10 MG/ML
INJECTION, EMULSION INTRAVENOUS PRN
Status: DISCONTINUED | OUTPATIENT
Start: 2020-06-19 | End: 2020-06-19 | Stop reason: SDUPTHER

## 2020-06-19 RX ORDER — PETROLATUM 42 G/100G
OINTMENT TOPICAL 2 TIMES DAILY
Status: DISCONTINUED | OUTPATIENT
Start: 2020-06-19 | End: 2020-06-23 | Stop reason: HOSPADM

## 2020-06-19 RX ORDER — HYDROCODONE BITARTRATE AND ACETAMINOPHEN 5; 325 MG/1; MG/1
1 TABLET ORAL PRN
Status: DISCONTINUED | OUTPATIENT
Start: 2020-06-19 | End: 2020-06-19 | Stop reason: HOSPADM

## 2020-06-19 RX ORDER — SODIUM CHLORIDE 9 MG/ML
INJECTION, SOLUTION INTRAVENOUS CONTINUOUS PRN
Status: DISCONTINUED | OUTPATIENT
Start: 2020-06-19 | End: 2020-06-19 | Stop reason: SDUPTHER

## 2020-06-19 RX ORDER — ONDANSETRON 2 MG/ML
INJECTION INTRAMUSCULAR; INTRAVENOUS PRN
Status: DISCONTINUED | OUTPATIENT
Start: 2020-06-19 | End: 2020-06-19 | Stop reason: SDUPTHER

## 2020-06-19 RX ORDER — ESMOLOL HYDROCHLORIDE 10 MG/ML
INJECTION INTRAVENOUS PRN
Status: DISCONTINUED | OUTPATIENT
Start: 2020-06-19 | End: 2020-06-19 | Stop reason: SDUPTHER

## 2020-06-19 RX ADMIN — LACTULOSE 20 G: 20 SOLUTION ORAL at 08:29

## 2020-06-19 RX ADMIN — ISOSORBIDE MONONITRATE 30 MG: 30 TABLET ORAL at 08:29

## 2020-06-19 RX ADMIN — INSULIN LISPRO 1 UNITS: 100 INJECTION, SOLUTION INTRAVENOUS; SUBCUTANEOUS at 21:03

## 2020-06-19 RX ADMIN — METOPROLOL TARTRATE 12.5 MG: 25 TABLET, FILM COATED ORAL at 21:02

## 2020-06-19 RX ADMIN — PANTOPRAZOLE SODIUM 40 MG: 40 TABLET, DELAYED RELEASE ORAL at 08:29

## 2020-06-19 RX ADMIN — METOPROLOL TARTRATE 12.5 MG: 25 TABLET, FILM COATED ORAL at 08:29

## 2020-06-19 RX ADMIN — ESMOLOL HYDROCHLORIDE 20 MG: 10 INJECTION, SOLUTION INTRAVENOUS at 14:50

## 2020-06-19 RX ADMIN — LIDOCAINE HYDROCHLORIDE 40 MG: 20 INJECTION, SOLUTION INTRAVENOUS at 14:45

## 2020-06-19 RX ADMIN — INSULIN LISPRO 1 UNITS: 100 INJECTION, SOLUTION INTRAVENOUS; SUBCUTANEOUS at 08:30

## 2020-06-19 RX ADMIN — ENOXAPARIN SODIUM 30 MG: 30 INJECTION SUBCUTANEOUS at 08:29

## 2020-06-19 RX ADMIN — FENTANYL CITRATE 25 MCG: 50 INJECTION, SOLUTION INTRAMUSCULAR; INTRAVENOUS at 14:49

## 2020-06-19 RX ADMIN — PETROLATUM: 42 OINTMENT TOPICAL at 21:07

## 2020-06-19 RX ADMIN — LIDOCAINE HYDROCHLORIDE 60 MG: 20 INJECTION, SOLUTION INTRAVENOUS at 15:21

## 2020-06-19 RX ADMIN — PROPOFOL 150 MG: 10 INJECTION, EMULSION INTRAVENOUS at 14:45

## 2020-06-19 RX ADMIN — ONDANSETRON HYDROCHLORIDE 4 MG: 2 INJECTION, SOLUTION INTRAMUSCULAR; INTRAVENOUS at 14:52

## 2020-06-19 RX ADMIN — INSULIN LISPRO 1 UNITS: 100 INJECTION, SOLUTION INTRAVENOUS; SUBCUTANEOUS at 17:32

## 2020-06-19 RX ADMIN — PANTOPRAZOLE SODIUM 40 MG: 40 TABLET, DELAYED RELEASE ORAL at 17:32

## 2020-06-19 RX ADMIN — SODIUM CHLORIDE: 9 INJECTION, SOLUTION INTRAVENOUS at 20:58

## 2020-06-19 RX ADMIN — SODIUM CHLORIDE, PRESERVATIVE FREE 10 ML: 5 INJECTION INTRAVENOUS at 21:03

## 2020-06-19 RX ADMIN — FENTANYL CITRATE 25 MCG: 50 INJECTION, SOLUTION INTRAMUSCULAR; INTRAVENOUS at 14:45

## 2020-06-19 RX ADMIN — ASPIRIN 81 MG: 81 TABLET, COATED ORAL at 08:29

## 2020-06-19 RX ADMIN — MEROPENEM 1 G: 1 INJECTION, POWDER, FOR SOLUTION INTRAVENOUS at 08:28

## 2020-06-19 RX ADMIN — SODIUM CHLORIDE: 9 INJECTION, SOLUTION INTRAVENOUS at 14:27

## 2020-06-19 RX ADMIN — MEROPENEM 1 G: 1 INJECTION, POWDER, FOR SOLUTION INTRAVENOUS at 20:57

## 2020-06-19 ASSESSMENT — PAIN SCALES - GENERAL
PAINLEVEL_OUTOF10: 0

## 2020-06-19 ASSESSMENT — PULMONARY FUNCTION TESTS
PIF_VALUE: 1
PIF_VALUE: 0
PIF_VALUE: 1
PIF_VALUE: 0
PIF_VALUE: 2
PIF_VALUE: 1
PIF_VALUE: 0
PIF_VALUE: 1
PIF_VALUE: 0
PIF_VALUE: 1
PIF_VALUE: 0

## 2020-06-19 NOTE — OP NOTE
bile duct was flushed with saline until the aspirate returned clear please. A push cholangiogram was obtained, this revealed dilated intra-and extrahepatic bile duct with a distal common bile duct stricture. Brushings were obtained with a cytology brush. A 10 mm x 60 mm fully covered wall flex stent was deployed across the stricture. This was in good position endoscopically and fluoroscopically. There was good bile flow. The procedure was terminated. The scope was withdrawn. The patient tolerated the procedure well. The Patient was taken to the Endoscopy Recovery area in satisfactory condition.       Electronically signed by Naima Chan MD on 6/19/2020 at 2:34 PM        Electronically signed by Naima Chan MD on 6/19/2020 at 3:35 PM

## 2020-06-19 NOTE — FLOWSHEET NOTE
Inpatient Wound Care (Initial consult) 5403B    Admit Date: 6/17/2020  6:01 PM    Reason for consult:  Buttocks wound    Significant history: Per H&P      CHIEF COMPLAINT:      UTI (urinary tract infection)     History of Present Illness: The patient is totally confused and unable to provide history. She was sent to the hospital because her mental status was acutely altered at the nursing home. She has history of metastatic pancreatic cancer. Findings:      06/19/20 0930   Skin Integrity   Skin Integrity   (purple, brown, dry patchy discoloration)   Location   (Bilateral buttocks and ishium)   Skin Integrity Site 2   Skin Integrity Location 2   (dryness)   Location 2 bilateral feet   Skin Integrity Site 3   Skin Integrity Location 3   (red area)    Location 3   (right posterior leg)   Skin Integrity Site 4   Skin Integrity Location 4 Bruising   Location 4 BUE   Wound 06/19/20 Coccyx Gluteal cleft   Date First Assessed/Time First Assessed: 06/19/20 0930   Present on Hospital Admission: No  Location: Coccyx  Wound Description (Comments): Gluteal cleft   Wound Image    Wound Pressure Stage  2   Dressing/Treatment Pharmaceutical agent (see MAR)   Wound Length (cm) 0.9 cm   Wound Width (cm) 0.4 cm   Wound Depth (cm) 0.1 cm   Wound Surface Area (cm^2) 0.36 cm^2   Wound Volume (cm^3) 0.04 cm^3   Wound Assessment Pink   Drainage Amount Scant   Drainage Description Serosanguinous   Odor None   Pooja-wound Assessment   (purple, brown, dry discoloration)   Pink%Wound Bed 100      **Informed Consent**     photos taken of wound and inserted into their chart as part of their permanent medical record for purposes of documentation, treatment management and/or medical review. All Images taken on 6/19/20 of patient name: Loli Primes were transmitted and stored on [employer] located within [employer] Tab by a registered [employer] Mobile Application Device.        Impression:  Stage 2 pressure injury coccyx, gluteal fold  Purple, brown, dry discoloration bilateral buttocks, and ishium    Plan: Aquaphor to buttocks, zeus-area, and posterior thighs  Low air loss module    Dr. Mihir Valle notified of pressure injury, via perfect serve Sherlie Lesches 6/19/2020 10:18 AM

## 2020-06-19 NOTE — CONSULTS
GENERAL SURGERY  CONSULT NOTE  6/19/2020    Physician Consulted: Dr. Alonzo Henson  Reason for Consult: hyperbilirubinemia, stent re-evaluation  Referring Physician: Dr. Rafaela STOVALL  Sammie Lamb is a 70 y.o. female who presents for evaluation of hyperbilirubinemia, re-evaluation of CBD stent. Patient is known to the service, presents from nursing facility for altered mental status. She was found to have a transaminitis and bilirubin of 4.4. Patient was found to have a likely pancreatic neoplasm in April, went for ERCP and had CBD stent placed at that time. Her discharge bili was 1.8. The brushings from the ERCP were indeterminate for malignant cells. Phone call with Dr. Alonzo Henson 5/5 states family was discussing ERCP EUS vs hospice. Patient is currently confused and unable to give any history. She denies abdominal pain, N, V.     Bili today 3.8, WBC 21.4. Urine cx + Proteus mirabilis.        Past Medical History:   Diagnosis Date    Anxiety     Asthma     Blind one eye     right eye    CAD (coronary artery disease)     Carpal tunnel syndrome, bilateral     COPD (chronic obstructive pulmonary disease) (HCC)     patient denies    Hypertension     IBS (irritable bowel syndrome)     patient denies having IBS    Macular degeneration bilateral    PONV (postoperative nausea and vomiting)     Type II or unspecified type diabetes mellitus without mention of complication, not stated as uncontrolled     UTI (urinary tract infection)     susceptible       Past Surgical History:   Procedure Laterality Date    ABDOMINAL AORTIC ANEURYSM REPAIR      APPENDECTOMY      CARDIAC SURGERY  1991    abdominal aortic bypass    CARPAL TUNNEL RELEASE  2005    right    CARPAL TUNNEL RELEASE  3/27/12    right    CARPAL TUNNEL RELEASE Left 07/01/2016    CERVICAL DISCECTOMY      CORONARY ARTERY BYPASS GRAFT      patient denies having heart surgery  9/9/15    ERCP N/A 4/12/2020    ERCP SPHINCTER/PAPILLOTOMY performed by MATEUS Belinda Solitario MD at [address] [place] [address] ERCP N/A 4/12/2020    ERCP STENT INSERTION performed by Wesley Baumann MD at [address] [place] [address] ERCP N/A 4/12/2020    ERCP DIAGNOSTIC WITH BRUSHINGS performed by Wesley Baumann MD at [place] [address] Left     cataract extraction both eye    HYSTERECTOMY      LAPAROSCOPY      X6    NERVE BLOCK  11 30 2011    therapeutic caudal with epiduragram #1    TONSILLECTOMY AND ADENOIDECTOMY      UPPER GASTROINTESTINAL ENDOSCOPY  september 2015       Medications Prior to Admission:    Prior to Admission medications    Medication Sig Start Date End Date Taking? Authorizing Provider   insulin glargine (LANTUS;BASAGLAR) 100 UNIT/ML injection pen Inject into the skin nightly   Yes Historical Provider, MD   Potassium Chloride 25 MEQ PACK Take by mouth daily   Yes Historical Provider, MD   lactulose 20 GM/30ML SOLN Take 30 mLs by mouth daily   Yes Historical Provider, MD   metoprolol tartrate (LOPRESSOR) 25 MG tablet Take 0.5 tablets by mouth 2 times daily 4/15/20  Yes Oval Pallas, DO   isosorbide mononitrate (IMDUR) 30 MG extended release tablet Take 1 tablet by mouth daily 4/15/20  Yes Oval Pallas, DO   pantoprazole (PROTONIX) 40 MG tablet Take 1 tablet by mouth 2 times daily (before meals) 4/15/20  Yes Oval Pallas, DO   atorvastatin (LIPITOR) 80 MG tablet Take 80 mg by mouth nightly    Yes Historical Provider, MD   Cholecalciferol (VITAMIN D3) 2000 UNITS TABS Take 1 tablet by mouth daily. Yes Historical Provider, MD   Multiple Vitamins-Minerals (VITEYES AREDS ADVANCED PO) Take 1 tablet by mouth 2 times daily.      Yes Historical Provider, MD   aspirin 81 MG EC tablet Take 81 mg by mouth daily    Yes Historical Provider, MD       Allergies   Allergen Reactions    Nickel     Other      An anesthesia medication    Sulfa Antibiotics Other (See Comments)     confusion    Vicodin [Hydrocodone-Acetaminophen] Hives    Morphine Nausea And Vomiting    Penicillins Rash    Tape Ervin Dandy Gordon Pickles Adjustment of MA and or KV according to patient's size or 3. Use of iterative reconstruction. Multiple computerized tomography sections of the abdomen and pelvis with sagittal and coronal MPR reconstructions were obtained from the top of the diaphragm to the pelvis. FINDINGS: LUNG BASES: The heart is mildly enlarged. Mild interstitial edema is seen. Dependent atelectasis and trace pleural effusions are noted. LIVER: Intrahepatic and extrahepatic biliary ductal dilatation is seen. Biliary stent is noted. No focal hepatic mass is seen. The liver is normal in size and contour. GALLBLADDER: Surgically absent. SPLEEN:Unremarkable. ADRENALS:Unremarkable. KIDNEYS: Normal in size and contour. 1 mm nonobstructive right renal calculus. No hydronephrosis. Small cyst along the upper pole of the right kidney. PANCREAS:Unremarkable. BOWEL: There is a shallow herniation of the anterior wall of the transverse colon a midline periumbilical ventral hernia. No bowel wall thickening or obstruction. APPENDIX: Surgically absent. BLADDER: Decompressed by Copeland catheter. Otherwise, unremarkable. REPRODUCTIVE ORGANS: Status post hysterectomy. VASCULATURE:Moderate calcified atherosclerosis seen in the abdominal aorta. Status post repair of the infrarenal aortic aneurysm. No current evidence of aortic aneurysm. LYMPH NODES:Unremarkable. BONES:Evaluation of the bones reveals no fracture or destructive lesion. MISCELLANEOUS: Surgical staples are seen in the para-aortic region related to abdominal aortic aneurysm repair. 1. Mild intrahepatic and prominent extrahepatic biliary ductal dilatation. CBD stent in situ. 2. Status post cholecystectomy. 3. 1 mm right renal calculus. No hydronephrosis. 4. Mild cardiomegaly with interstitial edema seen in the lower lobes.     Ct Head Wo Contrast    Result Date: 2020  Patient MRN: [id number] : 1940 Age:  70 years Gender: Female Order Date: 2020 6:15 PM Exam: CT HEAD WO CONTRAST Number of congestion  Hematological and Lymphatic ROS: negative spontaneous bleeding or bruising  Endocrine ROS: negative  lethargy, mood swings, palpitations or polydipsia/polyuria  Respiratory ROS: negative sputum changes, stridor, tachypnea or wheezing  Cardiovascular ROS: negative for - loss of consciousness, murmur or orthopnea  Gastrointestinal ROS: negative for - hematochezia or hematemesis  Genito-Urinary ROS: negative for -  genital discharge or hematuria  Musculoskeletal ROS: negative for - focal weakness, gangrene  Psych/Neuro ROS: negative for - visual or auditory hallucinations, suicidal ideation      Physical exam:   BP (!) 141/71   Pulse 102   Temp 98.6 °F (37 °C) (Temporal)   Resp 19   Ht 5' (1.524 m)   Wt 146 lb (66.2 kg)   SpO2 92%   BMI 28.51 kg/m²   General appearance:  NAD, appears stated age  Head: NCAT, PERRLA, EOMI, scleral icterus  Neck: supple, no masses, trachea midline  Lungs: Equal chest rise bilateral, no retractions, no wheezing  Heart: Reg rate  Abdomen: soft, non distended  Skin; warm and dry, no cyanosis  Gu: no cva tenderness  Extremities: atraumatic, no focal motor deficits, no open wounds  Psych: No tremor, visual hallucinations    Pathology: n/a    Radiology: I reviewed relevant abdominal imaging from this admission and that available in the EMR including CT abd/pel from 6/18/20. My assessment is biliary ductal dilation. Stent in place    Assessment:  Sammie Lamb is a 70 y.o. female with pancreatic head mass and biliary obstruction.  hyperbilirubinemia    Patient Active Problem List   Diagnosis    Carpal tunnel syndrome    Sprain of hand    Sprain and strain of unspecified site of shoulder and upper arm    Contusion of forearm    Shoulder pain    Shoulder impingement    Sciatica    Lumbar spinal stenosis    DDD (degenerative disc disease), lumbar    Bulging lumbar disc    DJD (degenerative joint disease) of knee    Knee pain    Medial meniscus tear    Cubital

## 2020-06-20 ENCOUNTER — APPOINTMENT (OUTPATIENT)
Dept: CT IMAGING | Age: 80
DRG: 871 | End: 2020-06-20
Payer: MEDICARE

## 2020-06-20 LAB
ALBUMIN SERPL-MCNC: 2.4 G/DL (ref 3.5–5.2)
ALP BLD-CCNC: 471 U/L (ref 35–104)
ALT SERPL-CCNC: 140 U/L (ref 0–32)
ANION GAP SERPL CALCULATED.3IONS-SCNC: 15 MMOL/L (ref 7–16)
AST SERPL-CCNC: 63 U/L (ref 0–31)
BASOPHILS ABSOLUTE: 0.01 E9/L (ref 0–0.2)
BASOPHILS RELATIVE PERCENT: 0.1 % (ref 0–2)
BILIRUB SERPL-MCNC: 2.4 MG/DL (ref 0–1.2)
BUN BLDV-MCNC: 42 MG/DL (ref 8–23)
CALCIUM SERPL-MCNC: 8.1 MG/DL (ref 8.6–10.2)
CHLORIDE BLD-SCNC: 102 MMOL/L (ref 98–107)
CO2: 18 MMOL/L (ref 22–29)
CREAT SERPL-MCNC: 1.1 MG/DL (ref 0.5–1)
EOSINOPHILS ABSOLUTE: 0 E9/L (ref 0.05–0.5)
EOSINOPHILS RELATIVE PERCENT: 0 % (ref 0–6)
GFR AFRICAN AMERICAN: 58
GFR NON-AFRICAN AMERICAN: 48 ML/MIN/1.73
GLUCOSE BLD-MCNC: 205 MG/DL (ref 74–99)
HCT VFR BLD CALC: 27.1 % (ref 34–48)
HEMOGLOBIN: 8.7 G/DL (ref 11.5–15.5)
IMMATURE GRANULOCYTES #: 0.14 E9/L
IMMATURE GRANULOCYTES %: 1 % (ref 0–5)
LYMPHOCYTES ABSOLUTE: 1.14 E9/L (ref 1.5–4)
LYMPHOCYTES RELATIVE PERCENT: 8.3 % (ref 20–42)
MAGNESIUM: 1.8 MG/DL (ref 1.6–2.6)
MCH RBC QN AUTO: 28.5 PG (ref 26–35)
MCHC RBC AUTO-ENTMCNC: 32.1 % (ref 32–34.5)
MCV RBC AUTO: 88.9 FL (ref 80–99.9)
METER GLUCOSE: 124 MG/DL (ref 74–99)
METER GLUCOSE: 150 MG/DL (ref 74–99)
METER GLUCOSE: 188 MG/DL (ref 74–99)
METER GLUCOSE: 215 MG/DL (ref 74–99)
MONOCYTES ABSOLUTE: 1.12 E9/L (ref 0.1–0.95)
MONOCYTES RELATIVE PERCENT: 8.1 % (ref 2–12)
NEUTROPHILS ABSOLUTE: 11.39 E9/L (ref 1.8–7.3)
NEUTROPHILS RELATIVE PERCENT: 82.5 % (ref 43–80)
ORGANISM: ABNORMAL
PDW BLD-RTO: 13 FL (ref 11.5–15)
PLATELET # BLD: 187 E9/L (ref 130–450)
PMV BLD AUTO: 11.9 FL (ref 7–12)
POTASSIUM SERPL-SCNC: 2.9 MMOL/L (ref 3.5–5)
RBC # BLD: 3.05 E12/L (ref 3.5–5.5)
SODIUM BLD-SCNC: 135 MMOL/L (ref 132–146)
TOTAL PROTEIN: 5.8 G/DL (ref 6.4–8.3)
URINE CULTURE, ROUTINE: ABNORMAL
WBC # BLD: 13.8 E9/L (ref 4.5–11.5)

## 2020-06-20 PROCEDURE — 1200000000 HC SEMI PRIVATE

## 2020-06-20 PROCEDURE — 6370000000 HC RX 637 (ALT 250 FOR IP): Performed by: SURGERY

## 2020-06-20 PROCEDURE — 6360000002 HC RX W HCPCS: Performed by: SURGERY

## 2020-06-20 PROCEDURE — 36415 COLL VENOUS BLD VENIPUNCTURE: CPT

## 2020-06-20 PROCEDURE — 6370000000 HC RX 637 (ALT 250 FOR IP): Performed by: INTERNAL MEDICINE

## 2020-06-20 PROCEDURE — 2580000003 HC RX 258: Performed by: SURGERY

## 2020-06-20 PROCEDURE — 2580000003 HC RX 258: Performed by: INTERNAL MEDICINE

## 2020-06-20 PROCEDURE — 85025 COMPLETE CBC W/AUTO DIFF WBC: CPT

## 2020-06-20 PROCEDURE — 82962 GLUCOSE BLOOD TEST: CPT

## 2020-06-20 PROCEDURE — 83735 ASSAY OF MAGNESIUM: CPT

## 2020-06-20 PROCEDURE — 2700000000 HC OXYGEN THERAPY PER DAY

## 2020-06-20 PROCEDURE — 71275 CT ANGIOGRAPHY CHEST: CPT

## 2020-06-20 PROCEDURE — 6360000004 HC RX CONTRAST MEDICATION: Performed by: RADIOLOGY

## 2020-06-20 PROCEDURE — 6360000002 HC RX W HCPCS: Performed by: INTERNAL MEDICINE

## 2020-06-20 PROCEDURE — 80053 COMPREHEN METABOLIC PANEL: CPT

## 2020-06-20 RX ORDER — SODIUM CHLORIDE, SODIUM LACTATE, POTASSIUM CHLORIDE, CALCIUM CHLORIDE 600; 310; 30; 20 MG/100ML; MG/100ML; MG/100ML; MG/100ML
INJECTION, SOLUTION INTRAVENOUS CONTINUOUS
Status: DISCONTINUED | OUTPATIENT
Start: 2020-06-20 | End: 2020-06-21

## 2020-06-20 RX ADMIN — INSULIN LISPRO 1 UNITS: 100 INJECTION, SOLUTION INTRAVENOUS; SUBCUTANEOUS at 09:51

## 2020-06-20 RX ADMIN — INSULIN LISPRO 1 UNITS: 100 INJECTION, SOLUTION INTRAVENOUS; SUBCUTANEOUS at 20:25

## 2020-06-20 RX ADMIN — MEROPENEM 1 G: 1 INJECTION, POWDER, FOR SOLUTION INTRAVENOUS at 09:50

## 2020-06-20 RX ADMIN — PETROLATUM: 42 OINTMENT TOPICAL at 09:50

## 2020-06-20 RX ADMIN — POTASSIUM BICARBONATE 40 MEQ: 782 TABLET, EFFERVESCENT ORAL at 11:40

## 2020-06-20 RX ADMIN — IOPAMIDOL 60 ML: 755 INJECTION, SOLUTION INTRAVENOUS at 08:15

## 2020-06-20 RX ADMIN — METOPROLOL TARTRATE 12.5 MG: 25 TABLET, FILM COATED ORAL at 20:24

## 2020-06-20 RX ADMIN — PANTOPRAZOLE SODIUM 40 MG: 40 TABLET, DELAYED RELEASE ORAL at 17:29

## 2020-06-20 RX ADMIN — Medication 10 ML: at 08:16

## 2020-06-20 RX ADMIN — ACETAMINOPHEN 650 MG: 325 TABLET ORAL at 21:55

## 2020-06-20 RX ADMIN — CEFTRIAXONE 2 G: 2 INJECTION, POWDER, FOR SOLUTION INTRAMUSCULAR; INTRAVENOUS at 12:52

## 2020-06-20 RX ADMIN — LACTULOSE 20 G: 20 SOLUTION ORAL at 09:51

## 2020-06-20 RX ADMIN — SKIN PROTECTANT: 44 OINTMENT TOPICAL at 12:57

## 2020-06-20 RX ADMIN — ASPIRIN 81 MG: 81 TABLET, COATED ORAL at 09:52

## 2020-06-20 RX ADMIN — INSULIN LISPRO 1 UNITS: 100 INJECTION, SOLUTION INTRAVENOUS; SUBCUTANEOUS at 12:53

## 2020-06-20 RX ADMIN — PANTOPRAZOLE SODIUM 40 MG: 40 TABLET, DELAYED RELEASE ORAL at 05:58

## 2020-06-20 RX ADMIN — ISOSORBIDE MONONITRATE 30 MG: 30 TABLET ORAL at 09:52

## 2020-06-20 RX ADMIN — PETROLATUM: 42 OINTMENT TOPICAL at 20:30

## 2020-06-20 RX ADMIN — ENOXAPARIN SODIUM 30 MG: 30 INJECTION SUBCUTANEOUS at 09:51

## 2020-06-20 RX ADMIN — SODIUM CHLORIDE, PRESERVATIVE FREE 10 ML: 5 INJECTION INTRAVENOUS at 09:57

## 2020-06-20 RX ADMIN — Medication 10 ML: at 12:58

## 2020-06-20 RX ADMIN — METOPROLOL TARTRATE 12.5 MG: 25 TABLET, FILM COATED ORAL at 09:51

## 2020-06-20 RX ADMIN — SODIUM CHLORIDE, POTASSIUM CHLORIDE, SODIUM LACTATE AND CALCIUM CHLORIDE: 600; 310; 30; 20 INJECTION, SOLUTION INTRAVENOUS at 11:40

## 2020-06-20 ASSESSMENT — PAIN DESCRIPTION - FREQUENCY: FREQUENCY: INTERMITTENT

## 2020-06-20 ASSESSMENT — PAIN SCALES - PAIN ASSESSMENT IN ADVANCED DEMENTIA (PAINAD)
FACIALEXPRESSION: 0
FACIALEXPRESSION: 0
BREATHING: 0
BODYLANGUAGE: 0
NEGVOCALIZATION: 0
CONSOLABILITY: 0
BREATHING: 0
NEGVOCALIZATION: 0
TOTALSCORE: 0
TOTALSCORE: 0
CONSOLABILITY: 0
BODYLANGUAGE: 0
BREATHING: 0
NEGVOCALIZATION: 0
FACIALEXPRESSION: 0
CONSOLABILITY: 0
BODYLANGUAGE: 0
TOTALSCORE: 0

## 2020-06-20 ASSESSMENT — PAIN DESCRIPTION - DESCRIPTORS: DESCRIPTORS: PATIENT UNABLE TO DESCRIBE

## 2020-06-20 ASSESSMENT — PAIN SCALES - GENERAL
PAINLEVEL_OUTOF10: 0
PAINLEVEL_OUTOF10: 7
PAINLEVEL_OUTOF10: 0

## 2020-06-20 ASSESSMENT — PAIN - FUNCTIONAL ASSESSMENT: PAIN_FUNCTIONAL_ASSESSMENT: PREVENTS OR INTERFERES WITH MANY ACTIVE NOT PASSIVE ACTIVITIES

## 2020-06-20 ASSESSMENT — PAIN DESCRIPTION - PROGRESSION: CLINICAL_PROGRESSION: GRADUALLY WORSENING

## 2020-06-20 ASSESSMENT — PAIN DESCRIPTION - LOCATION: LOCATION: GENERALIZED

## 2020-06-20 ASSESSMENT — PAIN DESCRIPTION - PAIN TYPE: TYPE: SURGICAL PAIN

## 2020-06-20 ASSESSMENT — PAIN DESCRIPTION - ONSET: ONSET: AWAKENED FROM SLEEP

## 2020-06-20 NOTE — CONSULTS
lactated ringers infusion   Intravenous Continuous Alisa Nunez MD 50 mL/hr at 06/20/20 1140      cefTRIAXone (ROCEPHIN) 2 g in sterile water 20 mL IV syringe  2 g Intravenous Q24H Donaldo Novoa MD   2 g at 06/20/20 1252    mineral oil-hydrophilic petrolatum (HYDROPHOR) ointment   Topical BID Patrizia Hyatt MD        And    mineral oil-hydrophilic petrolatum (HYDROPHOR) ointment   Topical TID PRN Patrizia Hyatt MD        aspirin EC tablet 81 mg  81 mg Oral Daily Patrizia Hyatt MD   81 mg at 06/20/20 0367    isosorbide mononitrate (IMDUR) extended release tablet 30 mg  30 mg Oral Daily Patrizia Hyatt MD   30 mg at 06/20/20 0952    lactulose (CHRONULAC) 10 GM/15ML solution 20 g  30 mL Oral Daily Patrizia Hyatt MD   20 g at 06/20/20 0951    metoprolol tartrate (LOPRESSOR) tablet 12.5 mg  12.5 mg Oral BID Patrizia Hyatt MD   12.5 mg at 06/20/20 0951    pantoprazole (PROTONIX) tablet 40 mg  40 mg Oral BID AC Patrizia Hyatt MD   40 mg at 06/20/20 0558    sodium chloride flush 0.9 % injection 10 mL  10 mL Intravenous 2 times per day Patrizia Hyatt MD   10 mL at 06/20/20 0957    sodium chloride flush 0.9 % injection 10 mL  10 mL Intravenous PRN Patrizia Hyatt MD   10 mL at 06/20/20 1258    acetaminophen (TYLENOL) tablet 650 mg  650 mg Oral Q6H PRN Patrizia Hyatt MD        Or   [place] acetaminophen (TYLENOL) suppository 650 mg  650 mg Rectal Q6H PRN Patrizia Hyatt MD        magnesium hydroxide (MILK OF MAGNESIA) 400 MG/5ML suspension 30 mL  30 mL Oral Daily PRN Patrizia Hyatt MD        promethazine (PHENERGAN) tablet 12.5 mg  12.5 mg Oral Q6H PRN Patrizia Hyatt MD        Or    ondansetron (ZOFRAN) injection 4 mg  4 mg Intravenous Q6H PRN Patrizia Hyatt MD        enoxaparin (LOVENOX) injection 30 mg  30 mg Subcutaneous Daily Patrizia Hyatt MD   30 mg at 06/20/20 0951    insulin lispro (HUMALOG) injection vial 0-6 Units  0-6 Units Subcutaneous TID WC Juancarlos Moran MD   1 Units at 06/20/20 1253    insulin lispro (HUMALOG) injection vial 0-3 Units  0-3 Units Subcutaneous Nightly Juancarlos Moran MD   1 Units at 06/19/20 2103    glucose (GLUTOSE) 40 % oral gel 15 g  15 g Oral PRN Juancarlos Moran MD        dextrose 50 % IV solution  12.5 g Intravenous PRN Juancarlos Moran MD        glucagon (rDNA) injection 1 mg  1 mg Intramuscular PRN Juancarlos Moran MD        dextrose 5 % solution  100 mL/hr Intravenous PRN Juancarlos Moran MD         Facility-Administered Medications Ordered in Other Encounters   Medication Dose Route Frequency Provider Last Rate Last Dose    0.9 % sodium chloride infusion   Intravenous Continuous René Merchant MD   Stopped at 09/09/15 1018    sodium chloride flush 0.9 % injection 10 mL  10 mL Intravenous PRN René Merchant MD           Allergies   Allergen Reactions    Nickel     Other      An anesthesia medication    Sulfa Antibiotics Other (See Comments)     confusion    Vicodin [Hydrocodone-Acetaminophen] Hives    Morphine Nausea And Vomiting    Penicillins Rash    Tape Osiris Leann Tape] Rash    Versed [Midazolam] Hives and Nausea And Vomiting       Surgical History  Past Surgical History:   Procedure Laterality Date    ABDOMINAL AORTIC ANEURYSM REPAIR      APPENDECTOMY      CARDIAC SURGERY  1991    abdominal aortic bypass    CARPAL TUNNEL RELEASE  2005    right    CARPAL TUNNEL RELEASE  3/27/12    right    CARPAL TUNNEL RELEASE Left 07/01/2016    CERVICAL DISCECTOMY      CORONARY ARTERY BYPASS GRAFT      patient denies having heart surgery  9/9/15    ERCP N/A 4/12/2020    ERCP SPHINCTER/PAPILLOTOMY performed by Juancarlos Moran MD at [address] [place] [address] ERCP N/A 4/12/2020    ERCP STENT INSERTION performed by Juancarlos Moran MD at [address] [place] [address] ERCP N/A 4/12/2020    ERCP DIAGNOSTIC WITH BRUSHINGS performed by Juancarlos Moran MD at [place] [address] Left     cataract extraction both eye    HYSTERECTOMY      LAPAROSCOPY      X6    NERVE BLOCK  11 30 2011    therapeutic caudal with epiduragram #1    TONSILLECTOMY AND ADENOIDECTOMY      UPPER GASTROINTESTINAL ENDOSCOPY  september 2015        Social History  Social History     Socioeconomic History    Marital status:    Tobacco Use    Smoking status: Former Smoker    Smokeless tobacco: Current User   Substance and Sexual Activity    Alcohol use: No    Drug use: No       Family Medical History  History reviewed. No pertinent family history. Review of Systems:  Unable to obtain due to patient not cooperative and just stating that she wanted to go home. Physical Examination:  Vitals:    06/20/20 0030 06/20/20 0302 06/20/20 0800 06/20/20 1200   BP: 127/81 (!) 144/72 (!) 168/74    Pulse: 92 97 78    Resp: 16 18 16    Temp: 98.8 °F (37.1 °C) 98.4 °F (36.9 °C) 98.6 °F (37 °C)    TempSrc: Temporal Temporal Temporal    SpO2: 92% 94% 96%    Weight:    144 lb (65.3 kg)   Height:         Constitutional: Awake, not in distress  Eyes: Sclerae anicteric, no conjunctival erythema  ENT: No buccal lesion, no pharyngeal exudates  Neck: No nuchal rigidity, no cervical adenopathy  Lungs: Clear breath sounds, no crackles, no wheezes  Heart: Regular rate and rhythm, no murmurs  Abdomen: Bowel sounds present, soft, nontender  Skin: Warm and dry, no active dermatoses  Musculoskeletal: No joint erythema, no joint swelling    Labs, imaging, and medical records/notes were personally reviewed. Assessment:  Leukocytosis, secondary to complicated UTI and cholangitis, status post ERCP with stent exchange on 06/19    Plan:  Change meropenem to ceftriaxone 2 g every 24 hours. Anticipate 7 days of antibiotic therapy. Thank you for involving me in the care of Thelma Horvath. I will continue to follow. Please do not hesitate to call for any questions or concerns.       Electronically signed by Giovanna Leary MD on 6/20/2020 at 3:51 PM

## 2020-06-21 LAB
ALBUMIN SERPL-MCNC: 2.6 G/DL (ref 3.5–5.2)
ALP BLD-CCNC: 456 U/L (ref 35–104)
ALT SERPL-CCNC: 101 U/L (ref 0–32)
ANION GAP SERPL CALCULATED.3IONS-SCNC: 14 MMOL/L (ref 7–16)
AST SERPL-CCNC: 28 U/L (ref 0–31)
BASOPHILS ABSOLUTE: 0 E9/L (ref 0–0.2)
BASOPHILS RELATIVE PERCENT: 0.1 % (ref 0–2)
BILIRUB SERPL-MCNC: 1.2 MG/DL (ref 0–1.2)
BUN BLDV-MCNC: 38 MG/DL (ref 8–23)
CALCIUM SERPL-MCNC: 8.7 MG/DL (ref 8.6–10.2)
CHLORIDE BLD-SCNC: 101 MMOL/L (ref 98–107)
CO2: 20 MMOL/L (ref 22–29)
CREAT SERPL-MCNC: 1.1 MG/DL (ref 0.5–1)
EOSINOPHILS ABSOLUTE: 0 E9/L (ref 0.05–0.5)
EOSINOPHILS RELATIVE PERCENT: 0.8 % (ref 0–6)
GFR AFRICAN AMERICAN: 58
GFR NON-AFRICAN AMERICAN: 48 ML/MIN/1.73
GLUCOSE BLD-MCNC: 148 MG/DL (ref 74–99)
HCT VFR BLD CALC: 26.8 % (ref 34–48)
HEMOGLOBIN: 8.8 G/DL (ref 11.5–15.5)
LYMPHOCYTES ABSOLUTE: 1.76 E9/L (ref 1.5–4)
LYMPHOCYTES RELATIVE PERCENT: 12.2 % (ref 20–42)
MCH RBC QN AUTO: 28.8 PG (ref 26–35)
MCHC RBC AUTO-ENTMCNC: 32.8 % (ref 32–34.5)
MCV RBC AUTO: 87.6 FL (ref 80–99.9)
METER GLUCOSE: 140 MG/DL (ref 74–99)
METER GLUCOSE: 158 MG/DL (ref 74–99)
METER GLUCOSE: 191 MG/DL (ref 74–99)
METER GLUCOSE: 198 MG/DL (ref 74–99)
MONOCYTES ABSOLUTE: 0.74 E9/L (ref 0.1–0.95)
MONOCYTES RELATIVE PERCENT: 5.2 % (ref 2–12)
MYELOCYTE PERCENT: 1.7 % (ref 0–0)
NEUTROPHILS ABSOLUTE: 12.05 E9/L (ref 1.8–7.3)
NEUTROPHILS RELATIVE PERCENT: 80 % (ref 43–80)
PDW BLD-RTO: 12.9 FL (ref 11.5–15)
PLATELET # BLD: 240 E9/L (ref 130–450)
PMV BLD AUTO: 11.7 FL (ref 7–12)
POLYCHROMASIA: ABNORMAL
POTASSIUM SERPL-SCNC: 3 MMOL/L (ref 3.5–5)
PROMYELOCYTES PERCENT: 0.9 % (ref 0–0)
RBC # BLD: 3.06 E12/L (ref 3.5–5.5)
SODIUM BLD-SCNC: 135 MMOL/L (ref 132–146)
TOTAL PROTEIN: 6.5 G/DL (ref 6.4–8.3)
WBC # BLD: 14.7 E9/L (ref 4.5–11.5)

## 2020-06-21 PROCEDURE — 82962 GLUCOSE BLOOD TEST: CPT

## 2020-06-21 PROCEDURE — 2700000000 HC OXYGEN THERAPY PER DAY

## 2020-06-21 PROCEDURE — 6370000000 HC RX 637 (ALT 250 FOR IP): Performed by: SURGERY

## 2020-06-21 PROCEDURE — 2580000003 HC RX 258: Performed by: SURGERY

## 2020-06-21 PROCEDURE — 1200000000 HC SEMI PRIVATE

## 2020-06-21 PROCEDURE — 36415 COLL VENOUS BLD VENIPUNCTURE: CPT

## 2020-06-21 PROCEDURE — 80053 COMPREHEN METABOLIC PANEL: CPT

## 2020-06-21 PROCEDURE — 6360000002 HC RX W HCPCS: Performed by: INTERNAL MEDICINE

## 2020-06-21 PROCEDURE — 6360000002 HC RX W HCPCS: Performed by: SURGERY

## 2020-06-21 PROCEDURE — 6370000000 HC RX 637 (ALT 250 FOR IP): Performed by: INTERNAL MEDICINE

## 2020-06-21 PROCEDURE — 2580000003 HC RX 258: Performed by: INTERNAL MEDICINE

## 2020-06-21 PROCEDURE — 85025 COMPLETE CBC W/AUTO DIFF WBC: CPT

## 2020-06-21 RX ORDER — POTASSIUM CHLORIDE 20 MEQ/1
40 TABLET, EXTENDED RELEASE ORAL ONCE
Status: DISCONTINUED | OUTPATIENT
Start: 2020-06-21 | End: 2020-06-21

## 2020-06-21 RX ORDER — POTASSIUM CHLORIDE 20 MEQ/1
40 TABLET, EXTENDED RELEASE ORAL ONCE
Status: COMPLETED | OUTPATIENT
Start: 2020-06-21 | End: 2020-06-21

## 2020-06-21 RX ADMIN — PANTOPRAZOLE SODIUM 40 MG: 40 TABLET, DELAYED RELEASE ORAL at 16:11

## 2020-06-21 RX ADMIN — ACETAMINOPHEN 650 MG: 325 TABLET ORAL at 19:47

## 2020-06-21 RX ADMIN — SODIUM CHLORIDE, PRESERVATIVE FREE 10 ML: 5 INJECTION INTRAVENOUS at 20:47

## 2020-06-21 RX ADMIN — SODIUM CHLORIDE, PRESERVATIVE FREE 10 ML: 5 INJECTION INTRAVENOUS at 08:59

## 2020-06-21 RX ADMIN — Medication 10 ML: at 12:28

## 2020-06-21 RX ADMIN — ENOXAPARIN SODIUM 30 MG: 30 INJECTION SUBCUTANEOUS at 09:01

## 2020-06-21 RX ADMIN — INSULIN LISPRO 1 UNITS: 100 INJECTION, SOLUTION INTRAVENOUS; SUBCUTANEOUS at 08:59

## 2020-06-21 RX ADMIN — POTASSIUM CHLORIDE 40 MEQ: 1500 TABLET, EXTENDED RELEASE ORAL at 16:11

## 2020-06-21 RX ADMIN — ASPIRIN 81 MG: 81 TABLET, COATED ORAL at 09:01

## 2020-06-21 RX ADMIN — PANTOPRAZOLE SODIUM 40 MG: 40 TABLET, DELAYED RELEASE ORAL at 06:52

## 2020-06-21 RX ADMIN — PETROLATUM: 42 OINTMENT TOPICAL at 20:47

## 2020-06-21 RX ADMIN — INSULIN LISPRO 1 UNITS: 100 INJECTION, SOLUTION INTRAVENOUS; SUBCUTANEOUS at 20:55

## 2020-06-21 RX ADMIN — ISOSORBIDE MONONITRATE 30 MG: 30 TABLET ORAL at 09:01

## 2020-06-21 RX ADMIN — METOPROLOL TARTRATE 12.5 MG: 25 TABLET, FILM COATED ORAL at 09:00

## 2020-06-21 RX ADMIN — CEFTRIAXONE 2 G: 2 INJECTION, POWDER, FOR SOLUTION INTRAMUSCULAR; INTRAVENOUS at 12:28

## 2020-06-21 RX ADMIN — METOPROLOL TARTRATE 12.5 MG: 25 TABLET, FILM COATED ORAL at 20:47

## 2020-06-21 RX ADMIN — INSULIN LISPRO 1 UNITS: 100 INJECTION, SOLUTION INTRAVENOUS; SUBCUTANEOUS at 17:50

## 2020-06-21 RX ADMIN — INSULIN LISPRO 1 UNITS: 100 INJECTION, SOLUTION INTRAVENOUS; SUBCUTANEOUS at 12:28

## 2020-06-21 RX ADMIN — PETROLATUM: 42 OINTMENT TOPICAL at 09:20

## 2020-06-21 ASSESSMENT — PAIN SCALES - GENERAL
PAINLEVEL_OUTOF10: 6
PAINLEVEL_OUTOF10: 0

## 2020-06-22 PROBLEM — E87.6 HYPOKALEMIA: Status: ACTIVE | Noted: 2020-06-22

## 2020-06-22 LAB
ALBUMIN SERPL-MCNC: 2.6 G/DL (ref 3.5–5.2)
ALP BLD-CCNC: 415 U/L (ref 35–104)
ALT SERPL-CCNC: 72 U/L (ref 0–32)
ANION GAP SERPL CALCULATED.3IONS-SCNC: 13 MMOL/L (ref 7–16)
AST SERPL-CCNC: 20 U/L (ref 0–31)
BASOPHILS ABSOLUTE: 0 E9/L (ref 0–0.2)
BASOPHILS RELATIVE PERCENT: 0.3 % (ref 0–2)
BILIRUB SERPL-MCNC: 0.9 MG/DL (ref 0–1.2)
BUN BLDV-MCNC: 28 MG/DL (ref 8–23)
CALCIUM SERPL-MCNC: 8.8 MG/DL (ref 8.6–10.2)
CHLORIDE BLD-SCNC: 100 MMOL/L (ref 98–107)
CO2: 21 MMOL/L (ref 22–29)
CREAT SERPL-MCNC: 0.9 MG/DL (ref 0.5–1)
EOSINOPHILS ABSOLUTE: 0.59 E9/L (ref 0.05–0.5)
EOSINOPHILS RELATIVE PERCENT: 4.4 % (ref 0–6)
GFR AFRICAN AMERICAN: >60
GFR NON-AFRICAN AMERICAN: >60 ML/MIN/1.73
GLUCOSE BLD-MCNC: 149 MG/DL (ref 74–99)
HCT VFR BLD CALC: 27.8 % (ref 34–48)
HEMOGLOBIN: 9.1 G/DL (ref 11.5–15.5)
LYMPHOCYTES ABSOLUTE: 2.13 E9/L (ref 1.5–4)
LYMPHOCYTES RELATIVE PERCENT: 15.8 % (ref 20–42)
MCH RBC QN AUTO: 28.5 PG (ref 26–35)
MCHC RBC AUTO-ENTMCNC: 32.7 % (ref 32–34.5)
MCV RBC AUTO: 87.1 FL (ref 80–99.9)
METER GLUCOSE: 140 MG/DL (ref 74–99)
METER GLUCOSE: 173 MG/DL (ref 74–99)
METER GLUCOSE: 214 MG/DL (ref 74–99)
METER GLUCOSE: 219 MG/DL (ref 74–99)
MONOCYTES ABSOLUTE: 1.46 E9/L (ref 0.1–0.95)
MONOCYTES RELATIVE PERCENT: 11.4 % (ref 2–12)
MYELOCYTE PERCENT: 0.9 % (ref 0–0)
NEUTROPHILS ABSOLUTE: 9.04 E9/L (ref 1.8–7.3)
NEUTROPHILS RELATIVE PERCENT: 67.5 % (ref 43–80)
OVALOCYTES: ABNORMAL
PDW BLD-RTO: 12.9 FL (ref 11.5–15)
PLATELET # BLD: 294 E9/L (ref 130–450)
PMV BLD AUTO: 11.7 FL (ref 7–12)
POIKILOCYTES: ABNORMAL
POTASSIUM SERPL-SCNC: 3.4 MMOL/L (ref 3.5–5)
RBC # BLD: 3.19 E12/L (ref 3.5–5.5)
SODIUM BLD-SCNC: 134 MMOL/L (ref 132–146)
TARGET CELLS: ABNORMAL
TOTAL PROTEIN: 6.3 G/DL (ref 6.4–8.3)
WBC # BLD: 13.3 E9/L (ref 4.5–11.5)

## 2020-06-22 PROCEDURE — 2580000003 HC RX 258: Performed by: INTERNAL MEDICINE

## 2020-06-22 PROCEDURE — 6370000000 HC RX 637 (ALT 250 FOR IP): Performed by: INTERNAL MEDICINE

## 2020-06-22 PROCEDURE — 99024 POSTOP FOLLOW-UP VISIT: CPT | Performed by: SURGERY

## 2020-06-22 PROCEDURE — 6360000002 HC RX W HCPCS: Performed by: INTERNAL MEDICINE

## 2020-06-22 PROCEDURE — 80053 COMPREHEN METABOLIC PANEL: CPT

## 2020-06-22 PROCEDURE — 2700000000 HC OXYGEN THERAPY PER DAY

## 2020-06-22 PROCEDURE — 36415 COLL VENOUS BLD VENIPUNCTURE: CPT

## 2020-06-22 PROCEDURE — 6370000000 HC RX 637 (ALT 250 FOR IP): Performed by: SURGERY

## 2020-06-22 PROCEDURE — 1200000000 HC SEMI PRIVATE

## 2020-06-22 PROCEDURE — 85025 COMPLETE CBC W/AUTO DIFF WBC: CPT

## 2020-06-22 PROCEDURE — 2580000003 HC RX 258: Performed by: SURGERY

## 2020-06-22 PROCEDURE — 6360000002 HC RX W HCPCS: Performed by: SURGERY

## 2020-06-22 PROCEDURE — 82962 GLUCOSE BLOOD TEST: CPT

## 2020-06-22 PROCEDURE — 6370000000 HC RX 637 (ALT 250 FOR IP)

## 2020-06-22 RX ORDER — CEFDINIR 300 MG/1
300 CAPSULE ORAL 2 TIMES DAILY
Qty: 4 CAPSULE | Refills: 0
Start: 2020-06-24 | End: 2020-06-26

## 2020-06-22 RX ORDER — TRAMADOL HYDROCHLORIDE 50 MG/1
50 TABLET ORAL EVERY 6 HOURS PRN
Status: DISCONTINUED | OUTPATIENT
Start: 2020-06-22 | End: 2020-06-22 | Stop reason: SDUPTHER

## 2020-06-22 RX ORDER — ASPIRIN 81 MG/1
TABLET, CHEWABLE ORAL
Status: COMPLETED
Start: 2020-06-22 | End: 2020-06-22

## 2020-06-22 RX ORDER — TRAMADOL HYDROCHLORIDE 50 MG/1
25 TABLET ORAL EVERY 6 HOURS PRN
Status: DISCONTINUED | OUTPATIENT
Start: 2020-06-22 | End: 2020-06-22 | Stop reason: SDUPTHER

## 2020-06-22 RX ORDER — TRAMADOL HYDROCHLORIDE 50 MG/1
25 TABLET ORAL EVERY 6 HOURS PRN
Status: DISCONTINUED | OUTPATIENT
Start: 2020-06-22 | End: 2020-06-23 | Stop reason: HOSPADM

## 2020-06-22 RX ORDER — TRAMADOL HYDROCHLORIDE 50 MG/1
50 TABLET ORAL EVERY 6 HOURS PRN
Status: DISCONTINUED | OUTPATIENT
Start: 2020-06-22 | End: 2020-06-23 | Stop reason: HOSPADM

## 2020-06-22 RX ADMIN — TRAMADOL HYDROCHLORIDE 50 MG: 50 TABLET, FILM COATED ORAL at 12:26

## 2020-06-22 RX ADMIN — PETROLATUM: 42 OINTMENT TOPICAL at 21:19

## 2020-06-22 RX ADMIN — SODIUM CHLORIDE, PRESERVATIVE FREE 10 ML: 5 INJECTION INTRAVENOUS at 08:25

## 2020-06-22 RX ADMIN — PETROLATUM: 42 OINTMENT TOPICAL at 08:26

## 2020-06-22 RX ADMIN — INSULIN LISPRO 1 UNITS: 100 INJECTION, SOLUTION INTRAVENOUS; SUBCUTANEOUS at 12:27

## 2020-06-22 RX ADMIN — ISOSORBIDE MONONITRATE 30 MG: 30 TABLET ORAL at 08:25

## 2020-06-22 RX ADMIN — PANTOPRAZOLE SODIUM 40 MG: 40 TABLET, DELAYED RELEASE ORAL at 16:38

## 2020-06-22 RX ADMIN — INSULIN LISPRO 1 UNITS: 100 INJECTION, SOLUTION INTRAVENOUS; SUBCUTANEOUS at 21:18

## 2020-06-22 RX ADMIN — TRAMADOL HYDROCHLORIDE 50 MG: 50 TABLET, FILM COATED ORAL at 21:19

## 2020-06-22 RX ADMIN — ASPIRIN 81 MG 81 MG: 81 TABLET ORAL at 08:25

## 2020-06-22 RX ADMIN — PANTOPRAZOLE SODIUM 40 MG: 40 TABLET, DELAYED RELEASE ORAL at 06:23

## 2020-06-22 RX ADMIN — ENOXAPARIN SODIUM 30 MG: 30 INJECTION SUBCUTANEOUS at 08:25

## 2020-06-22 RX ADMIN — ACETAMINOPHEN 650 MG: 325 TABLET ORAL at 06:50

## 2020-06-22 RX ADMIN — METOPROLOL TARTRATE 12.5 MG: 25 TABLET, FILM COATED ORAL at 21:17

## 2020-06-22 RX ADMIN — CEFTRIAXONE 2 G: 2 INJECTION, POWDER, FOR SOLUTION INTRAMUSCULAR; INTRAVENOUS at 12:26

## 2020-06-22 RX ADMIN — METOPROLOL TARTRATE 12.5 MG: 25 TABLET, FILM COATED ORAL at 08:25

## 2020-06-22 RX ADMIN — INSULIN LISPRO 2 UNITS: 100 INJECTION, SOLUTION INTRAVENOUS; SUBCUTANEOUS at 16:37

## 2020-06-22 RX ADMIN — SODIUM CHLORIDE, PRESERVATIVE FREE 10 ML: 5 INJECTION INTRAVENOUS at 21:18

## 2020-06-22 ASSESSMENT — PAIN DESCRIPTION - DESCRIPTORS
DESCRIPTORS: PATIENT UNABLE TO DESCRIBE
DESCRIPTORS: DISCOMFORT;PATIENT UNABLE TO DESCRIBE

## 2020-06-22 ASSESSMENT — PAIN SCALES - GENERAL
PAINLEVEL_OUTOF10: 4
PAINLEVEL_OUTOF10: 0
PAINLEVEL_OUTOF10: 8
PAINLEVEL_OUTOF10: 7
PAINLEVEL_OUTOF10: 0
PAINLEVEL_OUTOF10: 7

## 2020-06-22 ASSESSMENT — PAIN DESCRIPTION - ONSET: ONSET: ON-GOING

## 2020-06-22 ASSESSMENT — PAIN DESCRIPTION - PAIN TYPE
TYPE: ACUTE PAIN
TYPE: SURGICAL PAIN

## 2020-06-22 ASSESSMENT — PAIN DESCRIPTION - FREQUENCY: FREQUENCY: INTERMITTENT

## 2020-06-22 ASSESSMENT — PAIN DESCRIPTION - LOCATION: LOCATION: GENERALIZED

## 2020-06-22 NOTE — CARE COORDINATION
6/22 Care Coordination: Plan for discharge to [facility]. Per Dr. Nik Bernal for 6/23 discharge. Sang Dan is a return patient  CM/SW will continue to follow for discharge planning.    Reyna HARRELL,RN--[phone]

## 2020-06-22 NOTE — PROGRESS NOTES
Chief Complaint:  Chief Complaint   Patient presents with    Altered Mental Status     sent in from [address] [place] [address] for [address]. Jaundiced on arrival, NH states ammonia was 45     UTI (urinary tract infection)     Subjective:    Patient is actually awake today and able to speak. She complains of pain in her anterior chest when she takes a deep breath, and says she feels short of breath. She denies flank pain. She is still obviously confused but more interactive than yesterday. Objective:    BP (!) 141/71   Pulse 102   Temp 98.6 °F (37 °C) (Temporal)   Resp 19   Ht 5' (1.524 m)   Wt 146 lb (66.2 kg)   SpO2 92%   BMI 28.51 kg/m²     Current medications that patient is taking have been reviewed. General appearance: ill appearing female, not frankly toxic  HEENT: AT/NC, MMM  Neck: FROM, supple  Lungs: Clear to auscultation, tachypneic, belly breathing  CV: RRR, no MRGs  Abdomen: Soft, non-tender; no masses or HSM, +BS  Extremities: No peripheral edema or digital cyanosis  Skin: no rash, lesions or ulcers  Psych: Anxious  Neuro: Alert and interactive, confused but able to communicate today    Labs:  CBC:   Lab Results   Component Value Date    WBC 21.4 06/19/2020    RBC 3.42 06/19/2020    HGB 9.8 06/19/2020    HCT 30.4 06/19/2020    MCV 88.9 06/19/2020    MCH 28.7 06/19/2020    MCHC 32.2 06/19/2020    RDW 13.1 06/19/2020     06/19/2020    MPV 11.8 06/19/2020     CMP:    Lab Results   Component Value Date     06/19/2020    K 3.2 06/19/2020    K 4.7 06/18/2020    CL 99 06/19/2020    CO2 16 06/19/2020    BUN 47 06/19/2020    CREATININE 1.2 06/19/2020    GFRAA 53 06/19/2020    LABGLOM 43 06/19/2020    GLUCOSE 204 06/19/2020    GLUCOSE 208 03/23/2012    PROT 6.6 06/19/2020    LABALBU 2.7 06/19/2020    CALCIUM 8.7 06/19/2020    BILITOT 3.8 06/19/2020    ALKPHOS 483 06/19/2020    AST 94 06/19/2020     06/19/2020        Imaging:  I've personally reviewed the patient's CT A/P:         1.  Mild
Dr Allan Blanca notified of consult.
Dr Idania Ryan to see.
Malcolm Mancera, wound care, perfect served to see for consult.
Mike mckeon served consult.
Nutrition Assessment    Type and Reason for Visit: Initial, Consult    Nutrition Recommendations: Continue current diet, Start ONS    Nutrition Assessment: Pt at nutritional risk d/t <50% po intake since admit. Noted AMS. Noted Pancreatic CA. Noted pressure ulcer. Will add Ensure HP & Dionicio BID to aid in recovery. Malnutrition Assessment:  · Malnutrition Status: Insufficient data  · Findings of the 6 clinical characteristics of malnutrition (Minimum of 2 out of 6 clinical characteristics is required to make the diagnosis of moderate or severe Protein Calorie Malnutrition based on AND/ASPEN Guidelines):  1. Energy Intake-Less than or equal to 50% of estimated energy requirement, (since admit BELKYS PTA intake)    2. Weight Loss-Unable to assess(limited hx on file)   3. Fat Loss-No significant subcutaneous fat loss  4. Muscle Loss-Mild muscle mass loss, Temples (temporalis muscle)  5. Fluid Accumulation-No significant fluid accumulation  6.  Strength-Not measured    Nutrition Risk Level:  Moderate    Nutrient Needs:  · Estimated Daily Total Kcal: 7302-1258(MSJ REE 1067 x 1.2 SF)  · Estimated Daily Protein (g): 60-70(1.3-1.5 g/kg monitor LFTs)  · Estimated Daily Total Fluid (ml/day): 8741-9655     Nutrition Diagnosis:   · Problem: Inadequate oral intake  · Etiology: related to Cognitive or neurological impairment     Signs and symptoms:  as evidenced by Intake 0-25%    Objective Information:  · Nutrition-Focused Physical Findings: Pt sleeping soundly, AMS on admit, fluid bal WNL, no edema, active BS, pancreatic CA, bili 5.8      · Wound Type: Pressure Ulcer, Stage II(coccyx )     · Current Nutrition Therapies:  · Oral Diet Orders: General   · Oral Diet intake: 1-25%(per doc flow)     · Anthropometric Measures:  · Ht: 5' (152.4 cm)   · Current Body Wt: 144 lb (65.3 kg)(6/20 bedscale)  · Usual Body Wt: 132 lb (59.9 kg)(4/9 EMR bedscale, no further hx on file)  · % Weight Change: EMR reflects 12lb wt gain x 2
Perfect serve sent to [facility] regarding patient's daughter Lawanda Block requesting to speak with someone.
YOKASTA PROGRESS NOTE      Chief complaint: Follow-up of cholangitis and UTI    The patient is a 70 y.o. female with history of CAD, COPD, hypertension, DM, metastatic pancreatic cancer with common bile duct stent in place since 04/2020, presented on 06/17 after having been found unresponsive. On admission, she was afebrile and hemodynamically stable with leukocytosis up to 29,000. She also had hyperbilirubinemia with CT of the abdomen and pelvis showing mild intrahepatic and prominent extrahepatic biliary ductal dilatation with common bile duct stent in situ, status post cholecystectomy, right renal calculus with no hydronephrosis, mild cardiomegaly with interstitial edema in the lower lobes. Urinalysis showed pyuria of more than 20 WBCs, moderate leukocyte esterase, positive nitrites with urine culture growing Proteus mirabilis (non-ESBL, susceptible to co-trimoxazole). She underwent ERCP with stent exchange on 06/19. She received ceftriaxone on admission and was started on meropenem on 06/18. Subjective: Patient was seen and examined. No chills, no abdominal pain, no diarrhea, no rash, no itching. Objective:    Vitals:    06/21/20 1700   BP: (!) 178/70   Pulse: 79   Resp: 17   Temp: 98.4 °F (36.9 °C)   SpO2:      Constitutional: Alert, not in distress  Respiratory: Clear breath sounds, no crackles, no wheezes  Cardiovascular: Regular rate and rhythm, no murmurs  Gastrointestinal: Bowel sounds present, soft, nontender  Skin: Warm and dry, no active dermatoses  Musculoskeletal: No joint swelling, no joint erythema    Labs, imaging, and medical records/notes were personally reviewed. Assessment:  Leukocytosis, secondary to complicated UTI and cholangitis, status post ERCP with stent exchange on 06/19    Recommendations:  Continue ceftriaxone 2 g every 24 hours for 7 days from 06/19-06/25.     Thank you for involving me in the care of Bryan Jaramillo. I will continue to follow.  Please do not hesitate
YOKASTA PROGRESS NOTE      Chief complaint: Follow-up of cholangitis and UTI    The patient is a 70 y.o. female with history of CAD, COPD, hypertension, DM, metastatic pancreatic cancer with common bile duct stent in place since 04/2020, presented on 06/17 after having been found unresponsive. On admission, she was afebrile and hemodynamically stable with leukocytosis up to 29,000. She also had hyperbilirubinemia with CT of the abdomen and pelvis showing mild intrahepatic and prominent extrahepatic biliary ductal dilatation with common bile duct stent in situ, status post cholecystectomy, right renal calculus with no hydronephrosis, mild cardiomegaly with interstitial edema in the lower lobes. Urinalysis showed pyuria of more than 20 WBCs, moderate leukocyte esterase, positive nitrites with urine culture growing Proteus mirabilis (non-ESBL, susceptible to co-trimoxazole). She underwent ERCP with stent exchange on 06/19. She received ceftriaxone on admission and was started on meropenem on 06/18. Subjective: Patient was seen and examined. No chills, no abdominal pain, no diarrhea, no rash, no itching. Objective:    Vitals:    06/22/20 0745   BP: (!) 180/89   Pulse: 77   Resp: 18   Temp: 97.8 °F (36.6 °C)   SpO2: 99%     Constitutional: Alert, not in distress  Respiratory: Clear breath sounds, no crackles, no wheezes  Cardiovascular: Regular rate and rhythm, no murmurs  Gastrointestinal: Bowel sounds present, soft, nontender  Skin: Warm and dry, no active dermatoses  Musculoskeletal: No joint swelling, no joint erythema    Labs, imaging, and medical records/notes were personally reviewed. Assessment:  Leukocytosis, secondary to complicated UTI and cholangitis, status post ERCP with stent exchange on 06/19  Proteus mirabilis UTI  Allergy to multiple antibiotics    Recommendations:  Continue ceftriaxone 2 g every 24 hours for 7 days from 06/19-06/25.  Switch to oral cefdinir 300 mg q12h on discharge to
calculus. No hydronephrosis. 4. Mild cardiomegaly with interstitial edema seen in the lower lobes. Assessment/Plan:  Principal Problem:    UTI (urinary tract infection)  Active Problems:    Sepsis ([address]    Pancreatic cancer (HCC)    Altered mental status    Metabolic encephalopathy    COPD (chronic obstructive pulmonary disease) (HCC)    Pyelonephritis    Acute kidney failure (HCC)    Type II or unspecified type diabetes mellitus without mention of complication, not stated as uncontrolled    Hypokalemia  Resolved Problems:    * No resolved hospital problems. *     Doing much better after ERCP    ID on board, has switched her to ceftriaxone for cholangitis and UTI    CTA chest - bilateral small pleural effusions and no PE    Metabolic encephalopathy due to sepsis has resolved    COPD stable    TERRELL due to ACEI and sepsis.   Resolved    Glucoses well controlled    Replete K PRN    Requires continued inpatient level of care  Rosana Zacarias    6/22/2020  Cell: [phone]
seen in the lower lobes. Assessment/Plan:  Principal Problem:    UTI (urinary tract infection)  Active Problems:    Sepsis ([address]    Pancreatic cancer (HCC)    Altered mental status    Metabolic encephalopathy    COPD (chronic obstructive pulmonary disease) (HCC)    Pyelonephritis    Acute kidney failure (HCC)    Type II or unspecified type diabetes mellitus without mention of complication, not stated as uncontrolled  Resolved Problems:    * No resolved hospital problems. *     Doing much better after ERCP    ID on board, has switched her to ceftriaxone for cholangitis and UTI    CTA chest personally reviewed - bilateral small pleural effusions and no PE    Metabolic encephalopathy due to sepsis has resolved    Sepsis improving, d/c IV fluids    COPD stable    TERRELL due to ACEI and sepsis.   Resolved    Glucoses well controlled    Requires continued inpatient level of care   Rosana Arizmendi    6/21/2020  Cell: [phone]
standardized testing/informal observation of tasks, assessment of data and education on plan of care and goals.     CPT codes:  [] Low Complexity PT evaluation 45489  [x] Moderate Complexity PT evaluation 46107  [] High Complexity PT evaluation 85763  [] PT Re-evaluation 11863  [] Gait training 50214 - minutes  [] Manual therapy 89553 - minutes  [] Therapeutic activities 82816 15 minutes  [] Therapeutic exercises 31659 - minutes  [] Neuromuscular reeducation 73784 - minutes     Eddi Mendoza, [address] [facility]

## 2020-06-23 VITALS
OXYGEN SATURATION: 94 % | BODY MASS INDEX: 29.29 KG/M2 | RESPIRATION RATE: 16 BRPM | HEART RATE: 72 BPM | HEIGHT: 60 IN | DIASTOLIC BLOOD PRESSURE: 75 MMHG | TEMPERATURE: 98 F | WEIGHT: 149.19 LBS | SYSTOLIC BLOOD PRESSURE: 180 MMHG

## 2020-06-23 PROBLEM — L89.102 PRESSURE INJURY OF BACK, STAGE 2 (HCC): Status: ACTIVE | Noted: 2020-06-23

## 2020-06-23 PROBLEM — Z97.8 FOLEY CATHETER IN PLACE: Status: ACTIVE | Noted: 2020-06-23

## 2020-06-23 LAB
ALBUMIN SERPL-MCNC: 2.8 G/DL (ref 3.5–5.2)
ALP BLD-CCNC: 378 U/L (ref 35–104)
ALT SERPL-CCNC: 55 U/L (ref 0–32)
ANION GAP SERPL CALCULATED.3IONS-SCNC: 13 MMOL/L (ref 7–16)
ANISOCYTOSIS: ABNORMAL
AST SERPL-CCNC: 17 U/L (ref 0–31)
ATYPICAL LYMPHOCYTE RELATIVE PERCENT: 0.9 % (ref 0–4)
BASOPHILS ABSOLUTE: 0 E9/L (ref 0–0.2)
BASOPHILS RELATIVE PERCENT: 0.3 % (ref 0–2)
BILIRUB SERPL-MCNC: 0.7 MG/DL (ref 0–1.2)
BUN BLDV-MCNC: 24 MG/DL (ref 8–23)
CALCIUM SERPL-MCNC: 8.2 MG/DL (ref 8.6–10.2)
CHLORIDE BLD-SCNC: 96 MMOL/L (ref 98–107)
CO2: 23 MMOL/L (ref 22–29)
CREAT SERPL-MCNC: 0.9 MG/DL (ref 0.5–1)
EOSINOPHILS ABSOLUTE: 0.64 E9/L (ref 0.05–0.5)
EOSINOPHILS RELATIVE PERCENT: 5.3 % (ref 0–6)
GFR AFRICAN AMERICAN: >60
GFR NON-AFRICAN AMERICAN: >60 ML/MIN/1.73
GLUCOSE BLD-MCNC: 156 MG/DL (ref 74–99)
HCT VFR BLD CALC: 27.3 % (ref 34–48)
HEMOGLOBIN: 8.7 G/DL (ref 11.5–15.5)
LYMPHOCYTES ABSOLUTE: 2.04 E9/L (ref 1.5–4)
LYMPHOCYTES RELATIVE PERCENT: 15.9 % (ref 20–42)
MCH RBC QN AUTO: 28.2 PG (ref 26–35)
MCHC RBC AUTO-ENTMCNC: 31.9 % (ref 32–34.5)
MCV RBC AUTO: 88.6 FL (ref 80–99.9)
METAMYELOCYTES RELATIVE PERCENT: 2.7 % (ref 0–1)
METER GLUCOSE: 142 MG/DL (ref 74–99)
MONOCYTES ABSOLUTE: 1.68 E9/L (ref 0.1–0.95)
MONOCYTES RELATIVE PERCENT: 14.2 % (ref 2–12)
NEUTROPHILS ABSOLUTE: 7.68 E9/L (ref 1.8–7.3)
NEUTROPHILS RELATIVE PERCENT: 61.1 % (ref 43–80)
PDW BLD-RTO: 13.2 FL (ref 11.5–15)
PLATELET # BLD: 307 E9/L (ref 130–450)
PMV BLD AUTO: 11.1 FL (ref 7–12)
POLYCHROMASIA: ABNORMAL
POTASSIUM SERPL-SCNC: 3.4 MMOL/L (ref 3.5–5)
RBC # BLD: 3.08 E12/L (ref 3.5–5.5)
SARS-COV-2, NAAT: NOT DETECTED
SODIUM BLD-SCNC: 132 MMOL/L (ref 132–146)
TOTAL PROTEIN: 6.1 G/DL (ref 6.4–8.3)
WBC # BLD: 12 E9/L (ref 4.5–11.5)

## 2020-06-23 PROCEDURE — 82962 GLUCOSE BLOOD TEST: CPT

## 2020-06-23 PROCEDURE — 6370000000 HC RX 637 (ALT 250 FOR IP): Performed by: INTERNAL MEDICINE

## 2020-06-23 PROCEDURE — 6370000000 HC RX 637 (ALT 250 FOR IP): Performed by: SURGERY

## 2020-06-23 PROCEDURE — U0002 COVID-19 LAB TEST NON-CDC: HCPCS

## 2020-06-23 PROCEDURE — 80053 COMPREHEN METABOLIC PANEL: CPT

## 2020-06-23 PROCEDURE — 6360000002 HC RX W HCPCS: Performed by: INTERNAL MEDICINE

## 2020-06-23 PROCEDURE — 6360000002 HC RX W HCPCS: Performed by: SURGERY

## 2020-06-23 PROCEDURE — 85025 COMPLETE CBC W/AUTO DIFF WBC: CPT

## 2020-06-23 PROCEDURE — 2700000000 HC OXYGEN THERAPY PER DAY

## 2020-06-23 PROCEDURE — 2580000003 HC RX 258: Performed by: INTERNAL MEDICINE

## 2020-06-23 PROCEDURE — 36415 COLL VENOUS BLD VENIPUNCTURE: CPT

## 2020-06-23 PROCEDURE — 2580000003 HC RX 258: Performed by: SURGERY

## 2020-06-23 RX ADMIN — ACETAMINOPHEN 650 MG: 325 TABLET ORAL at 09:35

## 2020-06-23 RX ADMIN — PANTOPRAZOLE SODIUM 40 MG: 40 TABLET, DELAYED RELEASE ORAL at 06:36

## 2020-06-23 RX ADMIN — ENOXAPARIN SODIUM 30 MG: 30 INJECTION SUBCUTANEOUS at 09:36

## 2020-06-23 RX ADMIN — PETROLATUM: 42 OINTMENT TOPICAL at 09:37

## 2020-06-23 RX ADMIN — ASPIRIN 81 MG: 81 TABLET, COATED ORAL at 09:34

## 2020-06-23 RX ADMIN — SODIUM CHLORIDE, PRESERVATIVE FREE 10 ML: 5 INJECTION INTRAVENOUS at 09:36

## 2020-06-23 RX ADMIN — ISOSORBIDE MONONITRATE 30 MG: 30 TABLET ORAL at 09:34

## 2020-06-23 RX ADMIN — METOPROLOL TARTRATE 12.5 MG: 25 TABLET, FILM COATED ORAL at 09:35

## 2020-06-23 RX ADMIN — CEFTRIAXONE 2 G: 2 INJECTION, POWDER, FOR SOLUTION INTRAMUSCULAR; INTRAVENOUS at 12:33

## 2020-06-23 RX ADMIN — TRAMADOL HYDROCHLORIDE 50 MG: 50 TABLET, FILM COATED ORAL at 09:34

## 2020-06-23 ASSESSMENT — PAIN SCALES - GENERAL
PAINLEVEL_OUTOF10: 6
PAINLEVEL_OUTOF10: 7
PAINLEVEL_OUTOF10: 5
PAINLEVEL_OUTOF10: 0

## 2020-06-23 NOTE — DISCHARGE SUMMARY
Physician Discharge Summary     Patient ID:  Bernard Estevez  [id number]  77 y.o.  1940    Admit date: 6/17/2020    Discharge date and time:  6/23/2020     Admission Diagnoses:   Chief Complaint   Patient presents with    Altered Mental Status     sent in from [facility] for [address]. Jaundiced on arrival, [facility] ammonia was 45      UTI (urinary tract infection)     Discharge Diagnoses:   Principal Problem:    UTI (urinary tract infection)  Active Problems:    Sepsis ([address]    Pancreatic cancer (HCC)    Altered mental status    Metabolic encephalopathy    COPD (chronic obstructive pulmonary disease) (HCC)    Pyelonephritis    Acute kidney failure (HCC)    Type II or unspecified type diabetes mellitus without mention of complication, not stated as uncontrolled    Hypokalemia    Copeland catheter in place    Pressure injury of back, stage 2 ([address] - not POA  Resolved Problems:    * No resolved hospital problems. *       Consults: ID and general surgery    Procedures:   EGD W/EUS FNA  ERCP ENDOSCOPIC RETROGRADE CHOLANGIOPANCREATOGRAPHY  ERCP BRUSHINGS  ERCP STENT REMOVAL  ERCP STONE REMOVAL  ERCP [address] Course:   Patient presented with sepsis and metabolic encephalopathy. She was severely delirious on admission. She had positive urinalysis and elevated bilirubin of 4 on admission. She has a previously placed biliary stent. She was initially treated with meropenem to cover both pyelonephritis and ascending cholangitis. Surgery was consulted and she went to the operating room for ERCP. Purulent material was found in the bile duct. Her stent was exchanged. She improved significantly after ERCP. Infectious disease saw her and has transitioned her to ceftriaxone to cover for a total of 7 days of therapy, she will be transitioned to cefdinir at time of discharge. Her encephalopathy has totally resolved. Please note, there was confusion about her code status at time of admission.   She

## 2020-06-23 NOTE — CARE COORDINATION
6/23 Care Coordination: Plan for discharge today. Facility will  [place] Dec at 1300. COVID test pending discharge to [facility]. CM/SW will continue to follow for discharge planning.  Concepcion MENDEZN,RN--[phone]

## 2020-07-17 PROBLEM — N39.0 UTI (URINARY TRACT INFECTION): Status: RESOLVED | Noted: 2020-06-17 | Resolved: 2020-07-17

## 2020-07-18 ENCOUNTER — HOSPITAL ENCOUNTER (OUTPATIENT)
Age: 80
Setting detail: OBSERVATION
Discharge: NURSING, REHAB OR LONG-TERM CARE | End: 2020-07-19
Attending: EMERGENCY MEDICINE | Admitting: INTERNAL MEDICINE
Payer: MEDICARE

## 2020-07-18 ENCOUNTER — APPOINTMENT (OUTPATIENT)
Dept: GENERAL RADIOLOGY | Age: 80
End: 2020-07-18
Payer: MEDICARE

## 2020-07-18 ENCOUNTER — APPOINTMENT (OUTPATIENT)
Dept: CT IMAGING | Age: 80
End: 2020-07-18
Payer: MEDICARE

## 2020-07-18 LAB
ALBUMIN SERPL-MCNC: 3.4 G/DL (ref 3.5–5.2)
ALP BLD-CCNC: 340 U/L (ref 35–104)
ALT SERPL-CCNC: 30 U/L (ref 0–32)
ANION GAP SERPL CALCULATED.3IONS-SCNC: 14 MMOL/L (ref 7–16)
AST SERPL-CCNC: 21 U/L (ref 0–31)
BACTERIA: ABNORMAL /HPF
BASOPHILS ABSOLUTE: 0.03 E9/L (ref 0–0.2)
BASOPHILS RELATIVE PERCENT: 0.3 % (ref 0–2)
BILIRUB SERPL-MCNC: 0.6 MG/DL (ref 0–1.2)
BILIRUBIN URINE: NEGATIVE
BLOOD, URINE: NEGATIVE
BUN BLDV-MCNC: 31 MG/DL (ref 8–23)
CALCIUM SERPL-MCNC: 9 MG/DL (ref 8.6–10.2)
CHLORIDE BLD-SCNC: 97 MMOL/L (ref 98–107)
CLARITY: CLEAR
CO2: 21 MMOL/L (ref 22–29)
COLOR: YELLOW
CREAT SERPL-MCNC: 1.3 MG/DL (ref 0.5–1)
EOSINOPHILS ABSOLUTE: 0.07 E9/L (ref 0.05–0.5)
EOSINOPHILS RELATIVE PERCENT: 0.7 % (ref 0–6)
GFR AFRICAN AMERICAN: 48
GFR NON-AFRICAN AMERICAN: 40 ML/MIN/1.73
GLUCOSE BLD-MCNC: 233 MG/DL (ref 74–99)
GLUCOSE URINE: NEGATIVE MG/DL
HCT VFR BLD CALC: 25 % (ref 34–48)
HEMOGLOBIN: 7.9 G/DL (ref 11.5–15.5)
IMMATURE GRANULOCYTES #: 0.09 E9/L
IMMATURE GRANULOCYTES %: 0.9 % (ref 0–5)
INR BLD: 1
KETONES, URINE: NEGATIVE MG/DL
LACTIC ACID, SEPSIS: 1.5 MMOL/L (ref 0.5–1.9)
LEUKOCYTE ESTERASE, URINE: NEGATIVE
LIPASE: 97 U/L (ref 13–60)
LYMPHOCYTES ABSOLUTE: 2.69 E9/L (ref 1.5–4)
LYMPHOCYTES RELATIVE PERCENT: 25.4 % (ref 20–42)
MCH RBC QN AUTO: 28.7 PG (ref 26–35)
MCHC RBC AUTO-ENTMCNC: 31.6 % (ref 32–34.5)
MCV RBC AUTO: 90.9 FL (ref 80–99.9)
MONOCYTES ABSOLUTE: 1.03 E9/L (ref 0.1–0.95)
MONOCYTES RELATIVE PERCENT: 9.7 % (ref 2–12)
NEUTROPHILS ABSOLUTE: 6.66 E9/L (ref 1.8–7.3)
NEUTROPHILS RELATIVE PERCENT: 63 % (ref 43–80)
NITRITE, URINE: NEGATIVE
PDW BLD-RTO: 14.6 FL (ref 11.5–15)
PH UA: 6 (ref 5–9)
PLATELET # BLD: 318 E9/L (ref 130–450)
PMV BLD AUTO: 11.1 FL (ref 7–12)
POTASSIUM SERPL-SCNC: 4.9 MMOL/L (ref 3.5–5)
PROTEIN UA: 100 MG/DL
PROTHROMBIN TIME: 11.3 SEC (ref 9.3–12.4)
RBC # BLD: 2.75 E12/L (ref 3.5–5.5)
RBC UA: ABNORMAL /HPF (ref 0–2)
SARS-COV-2, NAAT: NOT DETECTED
SODIUM BLD-SCNC: 132 MMOL/L (ref 132–146)
SPECIFIC GRAVITY UA: 1.02 (ref 1–1.03)
TOTAL PROTEIN: 6.9 G/DL (ref 6.4–8.3)
TROPONIN: 0.11 NG/ML (ref 0–0.03)
UROBILINOGEN, URINE: 0.2 E.U./DL
WBC # BLD: 10.6 E9/L (ref 4.5–11.5)
WBC UA: ABNORMAL /HPF (ref 0–5)
YEAST: PRESENT /HPF

## 2020-07-18 PROCEDURE — 74177 CT ABD & PELVIS W/CONTRAST: CPT

## 2020-07-18 PROCEDURE — 83605 ASSAY OF LACTIC ACID: CPT

## 2020-07-18 PROCEDURE — 70450 CT HEAD/BRAIN W/O DYE: CPT

## 2020-07-18 PROCEDURE — U0002 COVID-19 LAB TEST NON-CDC: HCPCS

## 2020-07-18 PROCEDURE — 85610 PROTHROMBIN TIME: CPT

## 2020-07-18 PROCEDURE — 83690 ASSAY OF LIPASE: CPT

## 2020-07-18 PROCEDURE — 2580000003 HC RX 258: Performed by: EMERGENCY MEDICINE

## 2020-07-18 PROCEDURE — 84484 ASSAY OF TROPONIN QUANT: CPT

## 2020-07-18 PROCEDURE — 80053 COMPREHEN METABOLIC PANEL: CPT

## 2020-07-18 PROCEDURE — 6360000004 HC RX CONTRAST MEDICATION: Performed by: RADIOLOGY

## 2020-07-18 PROCEDURE — 71045 X-RAY EXAM CHEST 1 VIEW: CPT

## 2020-07-18 PROCEDURE — 85025 COMPLETE CBC W/AUTO DIFF WBC: CPT

## 2020-07-18 PROCEDURE — 72125 CT NECK SPINE W/O DYE: CPT

## 2020-07-18 PROCEDURE — 81001 URINALYSIS AUTO W/SCOPE: CPT

## 2020-07-18 PROCEDURE — 99285 EMERGENCY DEPT VISIT HI MDM: CPT

## 2020-07-18 PROCEDURE — 73030 X-RAY EXAM OF SHOULDER: CPT

## 2020-07-18 RX ORDER — 0.9 % SODIUM CHLORIDE 0.9 %
500 INTRAVENOUS SOLUTION INTRAVENOUS ONCE
Status: COMPLETED | OUTPATIENT
Start: 2020-07-18 | End: 2020-07-19

## 2020-07-18 RX ORDER — SODIUM CHLORIDE 0.9 % (FLUSH) 0.9 %
10 SYRINGE (ML) INJECTION ONCE
Status: DISCONTINUED | OUTPATIENT
Start: 2020-07-18 | End: 2020-07-19 | Stop reason: HOSPADM

## 2020-07-18 RX ADMIN — SODIUM CHLORIDE 500 ML: 9 INJECTION, SOLUTION INTRAVENOUS at 22:58

## 2020-07-18 RX ADMIN — IOPAMIDOL 110 ML: 755 INJECTION, SOLUTION INTRAVENOUS at 23:38

## 2020-07-19 VITALS
SYSTOLIC BLOOD PRESSURE: 148 MMHG | BODY MASS INDEX: 29.25 KG/M2 | RESPIRATION RATE: 16 BRPM | WEIGHT: 149 LBS | DIASTOLIC BLOOD PRESSURE: 65 MMHG | OXYGEN SATURATION: 96 % | HEIGHT: 60 IN | TEMPERATURE: 97.9 F | HEART RATE: 70 BPM

## 2020-07-19 PROBLEM — R77.8 ELEVATED TROPONIN: Status: ACTIVE | Noted: 2020-07-19

## 2020-07-19 LAB
EKG ATRIAL RATE: 87 BPM
EKG P AXIS: 63 DEGREES
EKG P-R INTERVAL: 190 MS
EKG Q-T INTERVAL: 360 MS
EKG QRS DURATION: 88 MS
EKG QTC CALCULATION (BAZETT): 433 MS
EKG R AXIS: 38 DEGREES
EKG T AXIS: 79 DEGREES
EKG VENTRICULAR RATE: 87 BPM
METER GLUCOSE: 168 MG/DL (ref 74–99)
METER GLUCOSE: 233 MG/DL (ref 74–99)
TROPONIN: 0.11 NG/ML (ref 0–0.03)

## 2020-07-19 PROCEDURE — 82962 GLUCOSE BLOOD TEST: CPT

## 2020-07-19 PROCEDURE — G0378 HOSPITAL OBSERVATION PER HR: HCPCS

## 2020-07-19 PROCEDURE — 93005 ELECTROCARDIOGRAM TRACING: CPT | Performed by: EMERGENCY MEDICINE

## 2020-07-19 PROCEDURE — 84484 ASSAY OF TROPONIN QUANT: CPT

## 2020-07-19 PROCEDURE — 6370000000 HC RX 637 (ALT 250 FOR IP): Performed by: INTERNAL MEDICINE

## 2020-07-19 PROCEDURE — APPSS60 APP SPLIT SHARED TIME 46-60 MINUTES: Performed by: CLINICAL NURSE SPECIALIST

## 2020-07-19 PROCEDURE — 36415 COLL VENOUS BLD VENIPUNCTURE: CPT

## 2020-07-19 PROCEDURE — 99215 OFFICE O/P EST HI 40 MIN: CPT | Performed by: INTERNAL MEDICINE

## 2020-07-19 RX ORDER — CHOLECALCIFEROL (VITAMIN D3) 50 MCG
2000 TABLET ORAL DAILY
Status: DISCONTINUED | OUTPATIENT
Start: 2020-07-19 | End: 2020-07-19 | Stop reason: HOSPADM

## 2020-07-19 RX ORDER — OMEGA-3-ACID ETHYL ESTERS 1 G/1
2 CAPSULE, LIQUID FILLED ORAL 2 TIMES DAILY
Status: DISCONTINUED | OUTPATIENT
Start: 2020-07-19 | End: 2020-07-19 | Stop reason: HOSPADM

## 2020-07-19 RX ORDER — ATORVASTATIN CALCIUM 40 MG/1
80 TABLET, FILM COATED ORAL NIGHTLY
Status: DISCONTINUED | OUTPATIENT
Start: 2020-07-19 | End: 2020-07-19 | Stop reason: HOSPADM

## 2020-07-19 RX ORDER — PANTOPRAZOLE SODIUM 40 MG/1
40 TABLET, DELAYED RELEASE ORAL
Status: DISCONTINUED | OUTPATIENT
Start: 2020-07-19 | End: 2020-07-19 | Stop reason: HOSPADM

## 2020-07-19 RX ORDER — INSULIN GLARGINE 100 [IU]/ML
7 INJECTION, SOLUTION SUBCUTANEOUS NIGHTLY
Status: DISCONTINUED | OUTPATIENT
Start: 2020-07-19 | End: 2020-07-19 | Stop reason: HOSPADM

## 2020-07-19 RX ORDER — TRAZODONE HYDROCHLORIDE 50 MG/1
25 TABLET ORAL NIGHTLY
Status: DISCONTINUED | OUTPATIENT
Start: 2020-07-19 | End: 2020-07-19 | Stop reason: HOSPADM

## 2020-07-19 RX ORDER — TRAZODONE HYDROCHLORIDE 50 MG/1
25 TABLET ORAL NIGHTLY
COMMUNITY

## 2020-07-19 RX ORDER — ICOSAPENT ETHYL 1000 MG/1
2 CAPSULE ORAL 2 TIMES DAILY
COMMUNITY

## 2020-07-19 RX ORDER — ICOSAPENT ETHYL 1000 MG/1
2 CAPSULE ORAL 2 TIMES DAILY
Status: DISCONTINUED | OUTPATIENT
Start: 2020-07-19 | End: 2020-07-19 | Stop reason: CLARIF

## 2020-07-19 RX ORDER — POTASSIUM CHLORIDE 20 MEQ/1
20 TABLET, EXTENDED RELEASE ORAL
Status: DISCONTINUED | OUTPATIENT
Start: 2020-07-19 | End: 2020-07-19 | Stop reason: HOSPADM

## 2020-07-19 RX ORDER — LACTULOSE 10 G/15ML
30 SOLUTION ORAL DAILY
Status: DISCONTINUED | OUTPATIENT
Start: 2020-07-19 | End: 2020-07-19 | Stop reason: HOSPADM

## 2020-07-19 RX ORDER — ISOSORBIDE MONONITRATE 30 MG/1
30 TABLET, EXTENDED RELEASE ORAL DAILY
Status: DISCONTINUED | OUTPATIENT
Start: 2020-07-19 | End: 2020-07-19 | Stop reason: HOSPADM

## 2020-07-19 RX ORDER — ASPIRIN 81 MG/1
81 TABLET ORAL DAILY
Status: DISCONTINUED | OUTPATIENT
Start: 2020-07-19 | End: 2020-07-19 | Stop reason: HOSPADM

## 2020-07-19 RX ADMIN — METOPROLOL TARTRATE 12.5 MG: 25 TABLET, FILM COATED ORAL at 08:29

## 2020-07-19 RX ADMIN — INSULIN LISPRO 4 UNITS: 100 INJECTION, SOLUTION INTRAVENOUS; SUBCUTANEOUS at 11:46

## 2020-07-19 RX ADMIN — LACTULOSE 20 G: 20 SOLUTION ORAL at 08:29

## 2020-07-19 RX ADMIN — ISOSORBIDE MONONITRATE 30 MG: 30 TABLET, EXTENDED RELEASE ORAL at 08:29

## 2020-07-19 RX ADMIN — CHOLECALCIFEROL TAB 50 MCG (2000 UNIT) 2000 UNITS: 50 TAB at 08:29

## 2020-07-19 RX ADMIN — PANTOPRAZOLE SODIUM 40 MG: 40 TABLET, DELAYED RELEASE ORAL at 06:19

## 2020-07-19 RX ADMIN — INSULIN LISPRO 2 UNITS: 100 INJECTION, SOLUTION INTRAVENOUS; SUBCUTANEOUS at 08:34

## 2020-07-19 RX ADMIN — POTASSIUM CHLORIDE 20 MEQ: 20 TABLET, EXTENDED RELEASE ORAL at 08:28

## 2020-07-19 RX ADMIN — ASPIRIN 81 MG: 81 TABLET, COATED ORAL at 08:29

## 2020-07-19 RX ADMIN — OMEGA-3-ACID ETHYL ESTERS 2 G: 900 CAPSULE, LIQUID FILLED ORAL at 08:29

## 2020-07-19 ASSESSMENT — PAIN SCALES - GENERAL: PAINLEVEL_OUTOF10: 0

## 2020-07-19 ASSESSMENT — PAIN SCALES - WONG BAKER: WONGBAKER_NUMERICALRESPONSE: 0

## 2020-07-19 NOTE — DISCHARGE INSTR - COC
Continuity of Care Form    Patient Name: Reva Sanders   :  1940  MRN:  [id number]    Admit date:  2020  Discharge date:  ***    Code Status Order: [facility]   Advance Directives:   [address] [facility] Documentation     Date/Time Healthcare Directive Type of Healthcare Directive Copy in [address] Agent's Name Healthcare Agent's Phone Number    [phone]  Yes, patient has an advance directive for healthcare treatment  --  --  --  --  --          Admitting Physician:  Marquita Dan MD  PCP: Ed Shirley DO    Discharging Nurse: [facility]/Room#: 3857/3995-S  Discharging Unit Phone Number: ***    Emergency Contact:   Extended Emergency Contact Information  Primary Emergency Contact: Kimberly Clemens  Address: [address] [place] [address] Phone: [phone]  Mobile Phone: [phone]  Relation: Child  Preferred language: English   needed?  No  Secondary Emergency Contact: Jerry Patino  Mobile Phone: [phone]  Relation: Child    Past Surgical History:  Past Surgical History:   Procedure Laterality Date    ABDOMINAL AORTIC ANEURYSM REPAIR      APPENDECTOMY      CARDIAC SURGERY      abdominal aortic bypass    CARPAL TUNNEL RELEASE      right    CARPAL TUNNEL RELEASE  3/27/12    right    CARPAL TUNNEL RELEASE Left 2016    CERVICAL DISCECTOMY      CORONARY ARTERY BYPASS GRAFT      patient denies having heart surgery  9/9/15    ERCP N/A 2020    ERCP SPHINCTER/PAPILLOTOMY performed by Isma Bolaños MD at [address] [place] [address] ERCP N/A 2020    ERCP STENT INSERTION performed by Isma Bolaños MD at [address] [place] [address] ERCP N/A 2020    ERCP DIAGNOSTIC WITH BRUSHINGS performed by Isma Bolaños MD at [address] [place] [address] ERCP N/A 2020    ERCP ENDOSCOPIC RETROGRADE CHOLANGIOPANCREATOGRAPHY performed by Isma Bolaños MD at [address] [place] [address] ERCP 6/19/2020    ERCP STENT REMOVAL performed by Vee Arellano MD at [address] [facility] ERCP  6/19/2020    ERCP STONE REMOVAL performed by Vee Arellano MD at [address] [facility] ERCP  6/19/2020    ERCP STENT INSERTION performed by Vee Arellano MD at [address] [facility] [address] Left     cataract extraction both eye    HYSTERECTOMY      LAPAROSCOPY      X6    NERVE BLOCK  11 30 2011    therapeutic caudal with epiduragram #1    TONSILLECTOMY AND ADENOIDECTOMY      UPPER GASTROINTESTINAL ENDOSCOPY  september 2015    UPPER GASTROINTESTINAL ENDOSCOPY N/A 6/19/2020    EGD W/EUS FNA performed by Vee Arellano MD at [facility]       Immunization History: There is no immunization history on file for this patient.     Active Problems:  Patient Active Problem List   Diagnosis Code    Carpal tunnel syndrome G56.00    Sprain of hand S63.90XA    Sprain and strain of unspecified site of shoulder and upper arm [id number]    Contusion of forearm S50.10XA    Shoulder pain M25.519    Shoulder impingement M75.40    Sciatica M54.30    Lumbar spinal stenosis M48.061    DDD (degenerative disc disease), lumbar M51.36    Bulging lumbar disc M51.26    DJD (degenerative joint disease) of knee M17.10    Knee pain M25.569    Medial meniscus tear S83.249A    Cubital tunnel syndrome of both upper extremities G56.23    Acute respiratory failure with hypoxia ([address] J96.01    Sepsis ([address] A41.9    NSTEMI (non-ST elevated myocardial infarction) ([address] I21.4    Biliary obstruction K83.1    Pancreatic mass K86.89    Pancreatic cancer (HCC) C25.9    Altered mental status P38.25    Metabolic encephalopathy P25.52    COPD (chronic obstructive pulmonary disease) ([facility]) J44.9    Pyelonephritis N12    Acute kidney failure (HCC) N17.9    Type 2 diabetes mellitus with hyperglycemia, with long-term current use of insulin ([facility]) E11.65, Z79.4    Hypokalemia E87.6    Copeland catheter in place Z96.0    Pressure injury of back, stage 2 ([facility] - not POA L89.102    Elevated troponin R79.89    Dehydration E86.0    TERRELL (acute kidney injury) ([facility] N17.9    Anemia D64.9    Coronary artery disease involving native coronary artery of native heart without angina pectoris I25.10    Essential hypertension I10    PAD (peripheral artery disease) ([facility]) I73.9       Isolation/Infection:   Isolation          No Isolation        Patient Infection Status     Infection Onset Added Last Indicated Last Indicated By Review Planned Expiration Resolved Resolved By    None active    Resolved    COVID-19 Rule Out 07/18/20 07/18/20 07/18/20 COVID-19 (Ordered)   07/18/20 Rule-Out Test Resulted    COVID-19 Rule Out 06/23/20 06/23/20 06/23/20 COVID-19 (Ordered)   06/23/20 Rule-Out Test Resulted          Nurse Assessment:  Last Vital Signs: BP (!) 148/65   Pulse 70   Temp 97.9 °F (36.6 °C) (Infrared)   Resp 16   Ht 5' (1.524 m)   Wt 149 lb (67.6 kg)   SpO2 96%   BMI 29.10 kg/m²     Last documented pain score (0-10 scale): Pain Level: 0  Last Weight:   Wt Readings from Last 1 Encounters:   07/18/20 149 lb (67.6 kg)     Mental Status:  oriented, alert and x3    IV Access:  - None    Nursing Mobility/ADLs:  Walking   Assisted  Transfer  Assisted  Bathing  Assisted  Dressing  Assisted  Toileting  Assisted  Feeding  Assisted  Med Admin  Assisted  Med Delivery   whole    Wound Care Documentation and Therapy:  Wound 06/19/20 Coccyx (Active)   Wound Image   06/19/20 0930   Wound Pressure Unstageable 07/19/20 0140   Dressing/Treatment Open to air 07/19/20 0800   Wound Length (cm) 1.1 cm 07/19/20 0140   Wound Width (cm) 1.2 cm 07/19/20 0140   Wound Depth (cm) 0.2 cm 07/19/20 0140   Wound Surface Area (cm^2) 1.32 cm^2 07/19/20 0140   Change in Wound Size % (l*w) -266.67 07/19/20 0140   Wound Volume (cm^3) 0.26 cm^3 07/19/20 0140   Wound Healing % -550 07/19/20 0140   Wound Assessment Pink;Purple 07/19/20 0800   Drainage Amount Scant 06/19/20 2224 Drainage Description Serosanguinous 06/19/20 0930   Odor None 06/19/20 0930   Pooja-wound Assessment Purple;Red;Ecchymosis 07/19/20 0800   Temperance%Wound Bed 100 06/19/20 0930   Number of days: 30        Elimination:  Continence:   · Bowel: No  · Bladder: No  Urinary Catheter: None   Colostomy/Ileostomy/Ileal Conduit: No       Date of Last BM: 7/19/20    No intake or output data in the 24 hours ending 07/19/20 1520  No intake/output data recorded. Safety Concerns:     History of Falls (last 30 days) and At Risk for Falls    Impairments/Disabilities:      Hearing    Nutrition Therapy:  Current Nutrition Therapy:   - Oral Diet:  Carb Control 3 carbs/meal (1500kcals/day)    Routes of Feeding: Oral  Liquids: Thin Liquids  Daily Fluid Restriction: no  Last Modified Barium Swallow with Video (Video Swallowing Test): not done    Treatments at the Time of Hospital Discharge:   Respiratory Treatments:     Oxygen Therapy:  is not on home oxygen therapy.   Ventilator:    - No ventilator support    Rehab Therapies: {THERAPEUTIC INTERVENTION:2335628188}  Weight Bearing Status/Restrictions: No weight bearing restirctions  Other Medical Equipment (for information only, NOT a DME order):  {EQUIPMENT:378849130}  Other Treatments: ***    Patient's personal belongings (please select all that are sent with patient):  None    RN SIGNATURE:  Electronically signed by Vito Neff RN on 7/19/20 at 3:22 PM EDT    CASE MANAGEMENT/SOCIAL WORK SECTION    Inpatient Status Date: ***    Readmission Risk Assessment Score:  Readmission Risk              Risk of Unplanned Readmission:        0           Discharging to Facility/ Agency   · Name:   · Address:  · Phone:  · Fax:    Dialysis Facility (if applicable)   · Name:  · Address:  · Dialysis Schedule:  · Phone:  · Fax:    / signature: {Esignature:411551587}    PHYSICIAN SECTION    Prognosis: {Prognosis:7251876943}    Condition at Discharge: [address] Patient Condition:758266641}    Rehab Potential (if transferring to Rehab): {Prognosis:8230600065}    Recommended Labs or Other Treatments After Discharge: ***    Physician Certification: I certify the above information and transfer of María Finn  is necessary for the continuing treatment of the diagnosis listed and that she requires {Admit to Appropriate Level of Care:09259} for {GREATER/LESS:165989127} 30 days.      Update Admission H&P: {CHP DME Changes in KDILV:572159088}    PHYSICIAN SIGNATURE:  {Esignature:342973213}

## 2020-07-19 NOTE — ED NOTES
Bed: 22  Expected date:   Expected time:   Means of arrival:   Comments:  EMS     Luis Alfredo Reese RN  07/18/20 4097

## 2020-07-19 NOTE — DISCHARGE SUMMARY
[id number] [address]                               DISCHARGE SUMMARY    PATIENT NAME: Sravan Bingham                    :        1940  MED REC NO:   [id number]                            ROOM:       0630  ACCOUNT NO:   [de-identified]                           ADMIT DATE: 2020  PROVIDER:     Arias Bangura MD                  [address] [facility] DATE: 2020    DATE OF ADMISSION:  2020    DATE OF DISCHARGE:  2020    FINAL DIAGNOSES:  1. Abnormal troponin enzymes. 2.  Acute coronary artery disease. 3.  Hypertension. 4.  Diabetes. 5.  Chronic anemia. COURSE OF ILLNESSES:  This is a 74-year-old woman who was apparently  recently diagnosed with pancreatic cancer, who presented to the  emergency room, had been sent there from the nursing home. The nursing  facility sent her there apparently because she had an elevated white  count of 14,000. She did not have any fever or any signs or symptoms of  any infectious process otherwise. White count in the hospital was noted  to be 10,000. Again, she did not have any signs or symptoms of any  ongoing infection and she had no fever. She would have been discharged  except for an elevated troponin that was noted at 0.11. Prior to this,  the patient has had abnormal troponin approximately one month ago about  0.02. For this reason, the patient was kept in observation and seen by  Cardiology. She did not have any signs or symptoms of cardiac ischemia  whatsoever. She had no chest pain or shortness of breath. Nothing to  suggest an underlying myocardial infarction. She was seen by Cardiology  who did not feel that any further inpatient workup was evaluated and  recommended she continue on her usual medical treatment for her known  coronary disease including aspirin, metoprolol, and Imdur. Her blood  count was noted to be slightly low around 8.   Otherwise, remainder of  her lab work was normal.  The patient had no other complaints at all and  was very stable. No further workup planned except for the continuation  of her ongoing current medical therapy. There is no further reason for  her to be in the hospital and she will be discharged back to her nursing  facility with no change in the medication that she arrived on. Medications include NovoLog sliding scale, Lantus 6 units daily,  trazodone 50 mg at h.s., potassium 25 mEq daily, lactulose 20 mg daily,  aspirin 81 mg daily, metoprolol 25 mg half tablet b.i.d., Imdur 30 mg  daily, pantoprazole 40 mg daily, atorvastatin 80 mg daily, and vitamin D  2000 units daily.         Eliseo Granado MD    D: 07/19/2020 14:56:13       T: 07/19/2020 14:58:56     [id number]  Job#: [id number]     Doc#: [id number]    CC:  Dusty Kline DO

## 2020-07-19 NOTE — H&P
strength throughout. Her gait was  not assessed at this time. ASSESSMENT AND PLAN:  1. Abnormal troponin enzyme. Her troponin last evening and this  morning was 0.11. This was elevated from previous levels of troponin  noted partially a couple of months ago. She has no symptoms or signs to  suggest acute angina or MI. However, she does have a persistently  abnormal troponin. We will consult her cardiologist for further  evaluation of this abnormal enzyme. 2.  Coronary artery disease. We will continue her on her aspirin and  her metoprolol and Imdur. 3.  Hypertension. This is well controlled. 4.  Diabetes. This is controlled with low doses of Lantus and NovoLog. We will continue her current treatment with a.c. and h.s. blood sugars. 5.  Chronic anemia. Her hemoglobin is around 8. There is no evidence  of any ongoing bleeding. We will follow this along.         Kailey Newsome MD    D: 07/19/2020 10:06:15       T: 07/19/2020 10:09:52     [id number]  Job#: [id number]     Doc#: [id number]    CC:  Adali Lawson DO

## 2020-07-19 NOTE — PROGRESS NOTES
Per Dr. Candance Decree, [facility] for discharge.  Continue home meds with no new meds ordered

## 2020-07-19 NOTE — CONSULTS
Inpatient Cardiology Consultation      Reason for Consult:  Elevated troponin    Consulting Physician: Dr. Granda Found    Requesting Physician:  Dr. Mendoza Running     Date of Consultation: 7/19/2020     History of Present Illness:    Mrs. Nadine Espana is a 70year old lady who was originally seen by Dr. Denzel Ortiz in hospital consultation in April 2020 due to troponin elevation. At that time, she was admitted to [address] [place] [address] due to progressive weakness and recurrent falls. Echocardiogram showed normal ejection fraction without wall motion abnormalities, but with very limited visualization. However, she is a DNR-CC and her family declined further work up. During that admission, she was also found to have pancreatic mass and multiple lung nodules and underwent ERCP with placement of biliary stent. She was readmitted in June 2020 with UTI and cholangitis, and had FNA biopsy of the mass along with removal of the stent; pathology has returned showing malignant cells. She resides at [address] [place] [address], and was sent from there to the ER yesterday due to recurrent falls and reportedly elevated WBC count. In the ER, H&H was 7.9 & 25.0, BUN 31, Cr 1.3, and troponin 0.11. Following admission, a second troponin also returned at 0.11. Currently, Mrs. Nadine Espana is lying in bed and oriented to person only; she answers \"no\" to all questions, repeatedly says \"go away\" and is calling for \"Earline\". Due to her dementia and inability to provide history, information for the consult is taken from review of the EMR. Past Medical History:    1. Coronary artery calcifications on CT of chest 4/2020  2. Elevated troponin on admission 4/2020. She denied chest pain or dyspnea. Her family declined further work up  3. Hypertension  4. Diabetes mellitus  5. Hyperlipidemia  6. Dementia  7. Anemia  A. Positive hemoccult stool during admission in 4/2020  8. Pancreatic cancer with ERCP and biliary stent in 4/2020, removed in 6/2020.  CT of chest in 4/2020 showed multiple lung nodules   9. TERRELL and hyperkalemia in 4/2020  10. Elevated hepatocellular enzymes  11. Vitamin D deficiency   12. Cervical disc disease with history of anterior cervical fusion  13. History of hysterectomy    TTE 4/2020:  Summary   Technically difficult study - very limited visualization.   Normal left ventricular size and systolic function.   Ejection fraction is visually estimated at 60-65%.   Unable to completely assess diastolic function.   No apparent regional wall motion abnormalities seen, but visualization of  endocardial borders was incomplete.   Mild-moderate left ventricular concentric hypertrophy noted.   Valve not well visualized but no significant abnormalities seen on Doppler  assessment. Medications Prior to Admit:  Prior to Admission medications    Medication Sig Start Date End Date Taking?  Authorizing Provider   insulin aspart (NOVOLOG) 100 UNIT/ML injection vial Inject 2-10 Units into the skin 4 times daily (after meals and at bedtime)   Yes Historical Provider, MD   traZODone (DESYREL) 50 MG tablet Take 25 mg by mouth nightly   Yes Historical Provider, MD   Icosapent Ethyl (VASCEPA) 1 g CAPS capsule Take 2 capsules by mouth 2 times daily   Yes Historical Provider, MD   insulin glargine (LANTUS;BASAGLAR) 100 UNIT/ML injection pen Inject into the skin nightly   Yes Historical Provider, MD   Potassium Chloride 25 MEQ PACK Take by mouth daily   Yes Historical Provider, MD   lactulose 20 GM/30ML SOLN Take 30 mLs by mouth daily   Yes Historical Provider, MD   metoprolol tartrate (LOPRESSOR) 25 MG tablet Take 0.5 tablets by mouth 2 times daily 4/15/20  Yes Mozella Torie, DO   isosorbide mononitrate (IMDUR) 30 MG extended release tablet Take 1 tablet by mouth daily 4/15/20  Yes Mozella Torie, DO   pantoprazole (PROTONIX) 40 MG tablet Take 1 tablet by mouth 2 times daily (before meals) 4/15/20  Yes Mozella Torie, DO   atorvastatin (LIPITOR) 80 MG tablet Take 80 mg by mouth nightly Yes Historical Provider, MD   Cholecalciferol (VITAMIN D3) 2000 UNITS TABS Take 1 tablet by mouth daily. Yes Historical Provider, MD   Multiple Vitamins-Minerals (VITEYES AREDS ADVANCED PO) Take 1 tablet by mouth 2 times daily. Yes Historical Provider, MD   aspirin 81 MG EC tablet Take 81 mg by mouth daily    Yes Historical Provider, MD       Current Medications:    Current Facility-Administered Medications: white petrolatum ointment, , Topical, PRN  aspirin EC tablet 81 mg, 81 mg, Oral, Daily  atorvastatin (LIPITOR) tablet 80 mg, 80 mg, Oral, Nightly  vitamin D (CHOLECALCIFEROL) tablet 2,000 Units, 2,000 Units, Oral, Daily  isosorbide mononitrate (IMDUR) extended release tablet 30 mg, 30 mg, Oral, Daily  lactulose (CHRONULAC) 10 GM/15ML solution 20 g, 30 mL, Oral, Daily  metoprolol tartrate (LOPRESSOR) tablet 12.5 mg, 12.5 mg, Oral, BID  traZODone (DESYREL) tablet 25 mg, 25 mg, Oral, Nightly  pantoprazole (PROTONIX) tablet 40 mg, 40 mg, Oral, QAM AC  potassium chloride (KLOR-CON M) extended release tablet 20 mEq, 20 mEq, Oral, Daily with breakfast  insulin lispro (HUMALOG) injection vial 0-10 Units, 0-10 Units, Subcutaneous, 4x Daily AC & HS  insulin glargine (LANTUS) injection vial 7 Units, 7 Units, Subcutaneous, Nightly  omega-3 acid ethyl esters (LOVAZA) capsule 2 g, 2 g, Oral, BID  sodium chloride flush 0.9 % injection 10 mL, 10 mL, Intravenous, Once  Facility-Administered Medications Ordered in Other Encounters: 0.9 % sodium chloride infusion, , Intravenous, Continuous  sodium chloride flush 0.9 % injection 10 mL, 10 mL, Intravenous, PRN    Allergies:  Nickel; Other; Sulfa antibiotics; Vicodin [hydrocodone-acetaminophen]; Morphine; Penicillins;  Tape Merry Sabal tape]; and Versed [midazolam]    Social History:  Per EMR, as she is unable to provide history   Social History     Socioeconomic History    Marital status:      Spouse name: Not on file    Number of children: Not on file    Years of effort)  CARDIOVASCULAR:     Heart Ausculation- Regular rate and rhythm, no murmur, s3, s4 or rub   PV: No lower extremity edema. Feet warm to touch. ABDOMEN: Soft, non-tender to light palpation. Bowel sounds present. No palpable masses   MS: Skeletal muscle atrophy   : Deferred  SKIN: Warm and dry   NEURO / PSYCH: Oriented to person only. Agitated and tearful at times. Telemetry: SR with multifocal PVCs and triplet  ECG 7/18/2020: SR, 87 with PVCs    No intake or output data in the 24 hours ending 07/19/20 0925    Labs:   CBC:   Recent Labs     07/18/20 2211   WBC 10.6   HGB 7.9*   HCT 25.0*        BMP:   Recent Labs     07/18/20 2211      K 4.9   CO2 21*   BUN 31*   CREATININE 1.3*   LABGLOM 40   CALCIUM 9.0     HgA1c:   Lab Results   Component Value Date    LABA1C 7.7 (H) 04/10/2020     PT/INR:   Recent Labs     07/18/20 2211   PROTIME 11.3   INR 1.0   CARDIAC ENZYMES:  Recent Labs     07/18/20 2211 07/19/20  0818   TROPONINI 0.11* 0.11*     FASTING LIPID PANEL:  Lab Results   Component Value Date    CHOL 251 04/10/2020    HDL 44 04/10/2020    LDLCALC 154 04/10/2020    TRIG 263 04/10/2020     LIVER PROFILE:  Recent Labs     07/18/20 2211   AST 21   ALT 30   LABALBU 3.4*     CT abdomen and pelvis with IV contrast 7/18/2020:    1. Subcentimeter subtle hypodense right lobe of the liver (segment VII)were not visualized on the previous, which were performed without contrast enhancement. It is unclear whether these lesions are benign   or metastatic. 2. Stable 1.4 cm right lower lobe nodule, unchanged as of 4/9/2020. PET scan is recommended for further evaluation. 3. Prominent calcified atherosclerosis in the infrarenal aorta resulting in approximately 60% stenosis. 4. Shallow protrusion of the anterior wall of the transverse colon through a right ventral abdominal wall hernia. No evidence of bowel wall thickening or obstruction.      CXR 7/18/2020:  No airspace opacities or pleural effusions    CT head without contrast 7/18/2020:  No evidence of acute intracranial hemorrhage or edema     CT cervical spine without contrast 7/18/2020:  1. Stable, satisfactory alignment status post anterior fusion at C5 and C6.    2. No evidence of cervical spine fracture. Assessment and Recommendations to follow by Dr. Melyssa Wang. Electronically signed by TIM Bach on 7/19/2020 at 9:25 AM   ______________________________________________________________________  Cardiology attending attestation:  I have independently interviewed and examined the patient. I personally reviewed pertinent laboratory values and diagnostic testing (including, if applicable, chest xray, electrocardiogram, telemetry, echocardiogram, stress testing, and coronary angiography). I have reviewed the above documentation completed by ALONDRA Carvajal including past medical, social, and family history and agree with the findings, assessment and plan except where noted. Plan formulated under my direct supervision. I participated in all aspects of the medical decision making. Please see my additional contributions to the HPI, physical exam, and assessment / medical decision making:  _______________________________________________________________________    57-year-old female who is a DNR CC with history of hypertension, dementia, hyperlipidemia, recent diagnosis of pancreatic cancer with lung nodules, sent in from nursing home with concerns about recurrent falls and elevated WBC count. I am consulted for troponin elevation of 0.11, repeat same 0.11. Patient is extremely hard of hearing. She denies chest pain, shortness of breath. She is only asking for her breakfast.  EKG showed no acute changes. She she is anemic at 7.9 and has acute renal failure with a creatinine of 1.3, recent baseline being 0.9.   She likely has significant coronary artery disease in the basis of coronary calcification seen on a noncontrast chest CT from April 2020. Physical Exam:  BP (!) 167/92   Pulse 88   Temp 97.8 °F (36.6 °C) (Oral)   Resp 18   Ht 5' (1.524 m)   Wt 149 lb (67.6 kg)   SpO2 96%   BMI 29.10 kg/m²   General appearance: Chronically ill-appearing elderly lady, extremely hard of hearing, no acute respiratory distress  Skin: Intact, no rash  ENMT: Moist mucous membranes  Neck: Supple, no carotid bruits. Normal jugular venous pressure  Lungs: Clear to auscultation bilaterally. No wheezes, rales, or rhonchi  Cardiac: Regular rhythm with a normal rate, normal S1&S2, no murmurs apparent  Abdomen: Soft, positive bowel sounds, nontender  Extremities: Moves all extremities x 4, no lower extremity edema  Neurologic: No focal motor deficits apparent, normal mood and affect    Telemetry: Sinus rhythm  EKG: Sinus rhythm 87 bpm with occasional PVCs. Normal axis normal intervals. Nonspecific ST changes. Impression:   1. Mild troponin elevation with flat pattern. She has no chest pain at all. Not consistent with ACS. Likely demand ischemia in the setting of acute renal failure, anemia, falls, and pancreatic cancer. 2. Coronary artery disease, with significant coronary calcification noted on chest CT. Further work-up had previously been deferred by family. 3. Calcific atherosclerosis of the infrarenal abdominal aorta with reported 60% stenosis by CT  4. TERRELL, creatinine 1.3. Baseline 0.9.  5. Anemia, hemoglobin 7.9  6. Recent diagnosis of pancreatic cancer. 7. Liver and lung lesions noted on CT  8. Comorbid disease: hypertension, type 2 diabetes, hyperlipidemia, dementia    Recommendations:  Asymptomatic with mild troponin elevation in a flat pattern. Given DNR CC status and pancreatic cancer, and family previously declining further evaluation of coronary disease, no further cardiac evaluation required at this time and will treat her CAD medically.      Continue medical therapy with aspirin and statin   Continue metoprolol/isosorbide mononitrate   Risk factor modification   Okay for discharge from cardiology standpoint   Outpatient follow-up with Dr. Damaris Argueta    Thank you for the consultation. Please do not hesitate to call with questions.     Candace Gill MD  [facility]) Cardiology

## 2020-07-19 NOTE — PROGRESS NOTES
Spoke to nurse at [facility] at [facility] re: DNR paperwork being faxed over.

## 2020-07-19 NOTE — ED PROVIDER NOTES
HPI:  7/18/20,   Time: 9:49 PM EDT       Lindsay Egan is a 70 y.o. female presenting to the ED for elevated white blood cell count, beginning 2 days ago. The complaint has been persistent, mild in severity, and worsened by nothing. Patient is a pleasant 72-year-old female that per daughter has a history of recent diagnosis of pancreatic cancer, although they are not pursuing treatments. She resides in a nursing home. She has a history of dementia, as well. She is at her baseline mental status which is AAO x1. The daughter is concerned because she has had a couple of falls 1 out of bed and 2 at the nursing station over the course of the past week. The patient has been complaining some right shoulder pain since that time. She denies any headache. She is on any blood thinners. Patient has not had any fever she has not been complaining of any chest pain or abdominal pain. However, they did routine blood work and found her white count to be elevated at 14 in the last 2 days. Per staffing they had done a urinalysis and chest x-ray at the facility did not find it any abnormalities. Given her history of pancreatic cancer they wanted her out in for further abdominal evaluation. Patient has no abdominal pain here. She has not had any vomiting or diarrhea. Patient is not jaundiced and has no complaints. Again history is difficult due to patient's history of chronic dementia, most of the history is provided by nursing home staff as well as the daughter who is here at the bedside. The patient does have a history of hysterectomy as well as appendectomy.     Review of Systems:   Pertinent positives and negatives are stated within HPI, all other systems reviewed and are negative.          --------------------------------------------- PAST HISTORY ---------------------------------------------  Past Medical History:  has a past medical history of Anxiety, Asthma, Blind one eye, CAD (coronary artery disease), Carpal tunnel syndrome, bilateral, COPD (chronic obstructive pulmonary disease) ([facility] [address] Hypertension, IBS (irritable bowel syndrome), Macular degeneration, PONV (postoperative nausea and vomiting), Type II or unspecified type diabetes mellitus without mention of complication, not stated as uncontrolled, and UTI (urinary tract infection). Past Surgical History:  has a past surgical history that includes Abdominal aortic aneurysm repair; Cervical discectomy; Appendectomy; Hysterectomy; Carpal tunnel release (2005); Tonsillectomy and adenoidectomy; Nerve Block (11 30 2011); Coronary artery bypass graft; Carpal tunnel release (3/27/12); laparoscopy; eye surgery (Left); Upper gastrointestinal endoscopy (september 2015); Cardiac surgery (1991); Carpal tunnel release (Left, 07/01/2016); ERCP (N/A, 4/12/2020); ERCP (N/A, 4/12/2020); ERCP (N/A, 4/12/2020); Upper gastrointestinal endoscopy (N/A, 6/19/2020); ERCP (N/A, 6/19/2020); ERCP (6/19/2020); ERCP (6/19/2020); and ERCP (6/19/2020). Social History:  reports that she has quit smoking. She uses smokeless tobacco. She reports that she does not drink alcohol or use drugs. Family History: family history is not on file. The patients home medications have been reviewed. Allergies: Nickel; Other; Sulfa antibiotics; Vicodin [hydrocodone-acetaminophen]; Morphine; Penicillins;  Tape Zara Pink tape]; and Versed [midazolam]        ---------------------------------------------------PHYSICAL EXAM--------------------------------------    Constitutional/General: Alert and oriented x1 (baseline), well appearing, non toxic in NAD  Head: Normocephalic and atraumatic  Eyes: PERRL, EOMI, conjunctive normal, sclera non icteric  Mouth: Oropharynx clear, handling secretions, no trismus, no asymmetry of the posterior oropharynx or uvular edema  Neck: Supple, full ROM, non tender to palpation in the midline, no stridor, no crepitus, no meningeal signs  Respiratory: Lungs clear to auscultation bilaterally, no wheezes, rales, or rhonchi. Not in respiratory distress  Cardiovascular:  Regular rate. Regular rhythm. No murmurs, gallops, or rubs. 2+ distal pulses  Chest: No chest wall tenderness  GI:  Abdomen Soft, Non tender, Non distended. +BS. No organomegaly, no palpable masses,  No rebound, guarding, or rigidity. No Beltran sign, no McBurney's point tenderness. Musculoskeletal: Moves all extremities x 4. Warm and well perfused, no clubbing, cyanosis, or edema. Capillary refill <3 seconds  Integument: skin warm and dry. No rashes. No jaundice. Lymphatic: no lymphadenopathy noted  Neurologic:no focal deficits, symmetric strength 5/5 in the upper and lower extremities bilaterally;able  to follow easy commands. Psychiatric: Normal Affect    -------------------------------------------------- RESULTS -------------------------------------------------  I have personally reviewed all laboratory and imaging results for this patient. Results are listed below.      LABS:  Results for orders placed or performed during the hospital encounter of 07/18/20   CBC Auto Differential   Result Value Ref Range    WBC 10.6 4.5 - 11.5 E9/L    RBC 2.75 (L) 3.50 - 5.50 E12/L    Hemoglobin 7.9 (L) 11.5 - 15.5 g/dL    Hematocrit 25.0 (L) 34.0 - 48.0 %    MCV 90.9 80.0 - 99.9 fL    MCH 28.7 26.0 - 35.0 pg    MCHC 31.6 (L) 32.0 - 34.5 %    RDW 14.6 11.5 - 15.0 fL    Platelets 955 485 - 132 E9/L    MPV 11.1 7.0 - 12.0 fL    Neutrophils % 63.0 43.0 - 80.0 %    Immature Granulocytes % 0.9 0.0 - 5.0 %    Lymphocytes % 25.4 20.0 - 42.0 %    Monocytes % 9.7 2.0 - 12.0 %    Eosinophils % 0.7 0.0 - 6.0 %    Basophils % 0.3 0.0 - 2.0 %    Neutrophils Absolute 6.66 1.80 - 7.30 E9/L    Immature Granulocytes # 0.09 E9/L    Lymphocytes Absolute 2.69 1.50 - 4.00 E9/L    Monocytes Absolute 1.03 (H) 0.10 - 0.95 E9/L    Eosinophils Absolute 0.07 0.05 - 0.50 E9/L    Basophils Absolute 0.03 0.00 - 0.20 E9/L   Comprehensive Metabolic Panel   Result Value Ref Range    Sodium 132 132 - 146 mmol/L    Potassium 4.9 3.5 - 5.0 mmol/L    Chloride 97 (L) 98 - 107 mmol/L    CO2 21 (L) 22 - 29 mmol/L    Anion Gap 14 7 - 16 mmol/L    Glucose 233 (H) 74 - 99 mg/dL    BUN 31 (H) 8 - 23 mg/dL    CREATININE 1.3 (H) 0.5 - 1.0 mg/dL    GFR Non-African American 40 >=60 mL/min/1.73    GFR African American 48     Calcium 9.0 8.6 - 10.2 mg/dL    Total Protein 6.9 6.4 - 8.3 g/dL    Alb 3.4 (L) 3.5 - 5.2 g/dL    Total Bilirubin 0.6 0.0 - 1.2 mg/dL    Alkaline Phosphatase 340 (H) 35 - 104 U/L    ALT 30 0 - 32 U/L    AST 21 0 - 31 U/L   Troponin   Result Value Ref Range    Troponin 0.11 (H) 0.00 - 0.03 ng/mL   Lactate, Sepsis   Result Value Ref Range    Lactic Acid, Sepsis 1.5 0.5 - 1.9 mmol/L   Urinalysis   Result Value Ref Range    Color, UA Yellow Straw/Yellow    Clarity, UA Clear Clear    Glucose, Ur Negative Negative mg/dL    Bilirubin Urine Negative Negative    Ketones, Urine Negative Negative mg/dL    Specific Gravity, UA 1.025 1.005 - 1.030    Blood, Urine Negative Negative    pH, UA 6.0 5.0 - 9.0    Protein,  (A) Negative mg/dL    Urobilinogen, Urine 0.2 <2.0 E.U./dL    Nitrite, Urine Negative Negative    Leukocyte Esterase, Urine Negative Negative   Protime-INR   Result Value Ref Range    Protime 11.3 9.3 - 12.4 sec    INR 1.0    COVID-19   Result Value Ref Range    SARS-CoV-2, NAAT Not Detected Not Detected   Lipase   Result Value Ref Range    Lipase 97 (H) 13 - 60 U/L   Microscopic Urinalysis   Result Value Ref Range    WBC, UA 1-3 0 - 5 /HPF    RBC, UA NONE 0 - 2 /HPF    Bacteria, UA RARE (A) None Seen /HPF    Yeast, UA Present (A) None Seen /HPF       RADIOLOGY:  Interpreted by Radiologist.  CT Head WO Contrast   Final Result      No evidence of acute intracranial hemorrhage or edema. CT Cervical Spine WO Contrast   Final Result      1.  Stable, satisfactory alignment status post anterior fusion at C5   and C6.      2. No evidence of cervical spine fracture. CT ABDOMEN PELVIS W IV CONTRAST Additional Contrast? None   Final Result         1. Subcentimeter subtle hypodense right lobe of the liver (segment   VII)were not visualized on the previous, which were performed without   contrast enhancement. It is unclear whether these lesions are benign   or metastatic. 2. Stable 1.4 cm right lower lobe nodule, unchanged as of 4/9/2020. PET scan is recommended for further evaluation. 3. Prominent calcified atherosclerosis in the infrarenal aorta   resulting in approximately 60% stenosis. 4. Shallow protrusion of the anterior wall of the transverse colon   through a right ventral abdominal wall hernia. No evidence of bowel   wall thickening or obstruction. XR CHEST PORTABLE   Final Result      No airspace opacities or pleural effusion. XR SHOULDER RIGHT (MIN 2 VIEWS)   Final Result      No fracture or joint dislocation. EKG #1:  Interpreted by emergency department physician unless otherwise noted. Time:  00:15    Rate: 87  Rhythm: Sinus. Interpretation: normal EKG, normal sinus rhythm.      ------------------------- NURSING NOTES AND VITALS REVIEWED ---------------------------   The nursing notes within the ED encounter and vital signs as below have been reviewed by myself. BP (!) 177/83   Pulse 80   Temp 98.4 °F (36.9 °C)   Resp 20   Ht 5' (1.524 m)   Wt 149 lb (67.6 kg)   SpO2 98%   BMI 29.10 kg/m²   Oxygen Saturation Interpretation: Normal    The patients available past medical records and past encounters were reviewed.         ------------------------------ ED COURSE/MEDICAL DECISION MAKING----------------------  Medications   sodium chloride flush 0.9 % injection 10 mL (has no administration in time range)   0.9 % sodium chloride bolus (0 mLs Intravenous Stopped 7/19/20 0027)   iopamidol (ISOVUE-370) 76 % injection 110 mL (110 mLs Intravenous Given 7/18/20 8696)         ED COURSE:       Medical Decision Making:    Patient is a pleasant 26-year-old female who comes in from nursing home at her baseline mental status, which is AAO x1. She was brought in for an asymptomatic presentation but however on outpatient lab work with a white blood cell count of 14.1. We will do a further work-up including lab work urine chest x-ray. She is had recent falls we will do a CT the head cervical spine as well as chest x-ray and shoulder x-ray. Have a known history of pancreatic cancer. Differential diagnosis includes UTI pneumonia COVID, closed and closed head injury or bleed, dehydration. This patient has remained hemodynamically stable during their ED course. Patient EKG here in the emergency room showed sinus rhythm 87 beats a minute no signs of any ST changes. She had a CBC that actually showed a white count of 10.6. She was sent in for concern for elevated white blood cell count of 14.1 as an outpatient. She did have a hemoglobin of 7.9. She normally has a hemoglobin between 8 and 9. This is not markedly decreased. We will continue to monitor and likely do serial hemoglobins as an inpatient. Speaking with the daughter, the patient has not previously had to have transfusion. Her chemistry was normal other than elevated creatinine of 1.3. This is slightly elevated above her baseline of 0.9. She might have some mild dehydration troponin was slight elevated at 0.11 again could be due to the elevated creatinine. However, we should do further trending of the troponin. Lactic acid was normal urinalysis was negative lipase was normal CT of the head showed no acute process no acute bleed CT cervical spine showed no acute fracture. CT abdomen pelvis showed some hypodense areas in the liver concerning for possible metastatic lesion she does have known pancreatic cancer. No other acute pathology. The gallbladder is surgically absent of note.   Chest x-ray shows no acute process x-ray of the right shoulder showed no abnormality no fracture or dislocation. Patient emmanuel was sent in from her nursing home in [place] was by the PCP there with concerns to be evaluated for any abdominal pathology she has had cholangitis in the past patient does not have elevated white count here she has not had a fever she denies any abdominal pain she is not jaundicedShe is slightly elevated alk phos at 340 but has been chronically elevated in the past her bilirubin is normal.    Will speak to her PCP to admit the patient trend her hemoglobin as well as her as her troponins rehydrate and reassess for any further abnormalities in house. Daughter is comfortable this plan. I have a call out to Dr. Giovani Chavez or the doctor who is covering. Re-Evaluations:             Re-evaluation. Patients symptoms show no change    Re-examination  7/18/20   9:49 PM EDT          Vital Signs:   Vitals:    07/18/20 2117 07/18/20 2230   BP: (!) 130/58 (!) 177/83   Pulse: 81 80   Resp: 16 20   Temp: 98.1 °F (36.7 °C) 98.4 °F (36.9 °C)   TempSrc: Oral    SpO2: 94% 98%   Weight: 149 lb (67.6 kg)    Height: 5' (1.524 m)              Consultations:             Dr. Cheyanne Fajardo, covering patient's PCP. We both agree the patient has not needing a transfusion at this point and my our concern is that the patient's troponin is slightly elevated at 0.11. I do feel she would benefit for telemetry observation bed trend her troponins make sure this is not bumping any further, given patient's dementia status and inability to communicate extensively regarding her symptoms. Otherwise if these are negative at they feel to be able to be discharged home after period of observation. Counseling: The emergency provider has spoken with the patient and daughter and discussed todays results, in addition to providing specific details for the plan of care and counseling regarding the diagnosis and prognosis.   Questions are answered at this time and they are

## 2020-08-18 PROBLEM — R77.8 ELEVATED TROPONIN: Status: RESOLVED | Noted: 2020-07-19 | Resolved: 2020-08-18

## 2020-08-29 ENCOUNTER — HOSPITAL ENCOUNTER (EMERGENCY)
Age: 80
Discharge: NURSING, REHAB OR LONG-TERM CARE | End: 2020-08-30
Attending: EMERGENCY MEDICINE
Payer: MEDICARE

## 2020-08-29 LAB
ACETAMINOPHEN LEVEL: <5 MCG/ML (ref 10–30)
AMPHETAMINE SCREEN, URINE: NOT DETECTED
ANION GAP SERPL CALCULATED.3IONS-SCNC: 13 MMOL/L (ref 7–16)
BACTERIA: ABNORMAL /HPF
BARBITURATE SCREEN URINE: NOT DETECTED
BASOPHILS ABSOLUTE: 0.05 E9/L (ref 0–0.2)
BASOPHILS RELATIVE PERCENT: 0.5 % (ref 0–2)
BENZODIAZEPINE SCREEN, URINE: NOT DETECTED
BILIRUBIN URINE: NEGATIVE
BLOOD, URINE: NEGATIVE
BUN BLDV-MCNC: 30 MG/DL (ref 8–23)
CALCIUM SERPL-MCNC: 10.1 MG/DL (ref 8.6–10.2)
CANNABINOID SCREEN URINE: NOT DETECTED
CHLORIDE BLD-SCNC: 93 MMOL/L (ref 98–107)
CLARITY: CLEAR
CO2: 25 MMOL/L (ref 22–29)
COCAINE METABOLITE SCREEN URINE: NOT DETECTED
COLOR: YELLOW
CREAT SERPL-MCNC: 1.2 MG/DL (ref 0.5–1)
EOSINOPHILS ABSOLUTE: 0.19 E9/L (ref 0.05–0.5)
EOSINOPHILS RELATIVE PERCENT: 2 % (ref 0–6)
ETHANOL: <10 MG/DL (ref 0–0.08)
FENTANYL SCREEN, URINE: NOT DETECTED
GFR AFRICAN AMERICAN: 53
GFR NON-AFRICAN AMERICAN: 43 ML/MIN/1.73
GLUCOSE BLD-MCNC: 161 MG/DL (ref 74–99)
GLUCOSE URINE: NEGATIVE MG/DL
HCT VFR BLD CALC: 31.8 % (ref 34–48)
HEMOGLOBIN: 10.1 G/DL (ref 11.5–15.5)
IMMATURE GRANULOCYTES #: 0.04 E9/L
IMMATURE GRANULOCYTES %: 0.4 % (ref 0–5)
KETONES, URINE: NEGATIVE MG/DL
LEUKOCYTE ESTERASE, URINE: NEGATIVE
LYMPHOCYTES ABSOLUTE: 2.56 E9/L (ref 1.5–4)
LYMPHOCYTES RELATIVE PERCENT: 26.8 % (ref 20–42)
Lab: NORMAL
MCH RBC QN AUTO: 28.1 PG (ref 26–35)
MCHC RBC AUTO-ENTMCNC: 31.8 % (ref 32–34.5)
MCV RBC AUTO: 88.6 FL (ref 80–99.9)
METHADONE SCREEN, URINE: NOT DETECTED
MONOCYTES ABSOLUTE: 0.95 E9/L (ref 0.1–0.95)
MONOCYTES RELATIVE PERCENT: 9.9 % (ref 2–12)
NEUTROPHILS ABSOLUTE: 5.76 E9/L (ref 1.8–7.3)
NEUTROPHILS RELATIVE PERCENT: 60.4 % (ref 43–80)
NITRITE, URINE: NEGATIVE
OPIATE SCREEN URINE: NOT DETECTED
OXYCODONE URINE: NOT DETECTED
PDW BLD-RTO: 14.5 FL (ref 11.5–15)
PH UA: 6 (ref 5–9)
PHENCYCLIDINE SCREEN URINE: NOT DETECTED
PLATELET # BLD: 375 E9/L (ref 130–450)
PMV BLD AUTO: 10 FL (ref 7–12)
POTASSIUM REFLEX MAGNESIUM: 4.2 MMOL/L (ref 3.5–5)
PROTEIN UA: >=300 MG/DL
RBC # BLD: 3.59 E12/L (ref 3.5–5.5)
RBC UA: ABNORMAL /HPF (ref 0–2)
SALICYLATE, SERUM: <0.3 MG/DL (ref 0–30)
SARS-COV-2, NAAT: NOT DETECTED
SODIUM BLD-SCNC: 131 MMOL/L (ref 132–146)
SPECIFIC GRAVITY UA: 1.02 (ref 1–1.03)
TRICYCLIC ANTIDEPRESSANTS SCREEN SERUM: NEGATIVE NG/ML
TROPONIN: 0.03 NG/ML (ref 0–0.03)
UROBILINOGEN, URINE: 0.2 E.U./DL
WBC # BLD: 9.6 E9/L (ref 4.5–11.5)
WBC UA: ABNORMAL /HPF (ref 0–5)

## 2020-08-29 PROCEDURE — G0480 DRUG TEST DEF 1-7 CLASSES: HCPCS

## 2020-08-29 PROCEDURE — 84484 ASSAY OF TROPONIN QUANT: CPT

## 2020-08-29 PROCEDURE — 93005 ELECTROCARDIOGRAM TRACING: CPT | Performed by: EMERGENCY MEDICINE

## 2020-08-29 PROCEDURE — U0002 COVID-19 LAB TEST NON-CDC: HCPCS

## 2020-08-29 PROCEDURE — 99285 EMERGENCY DEPT VISIT HI MDM: CPT

## 2020-08-29 PROCEDURE — 85025 COMPLETE CBC W/AUTO DIFF WBC: CPT

## 2020-08-29 PROCEDURE — 80048 BASIC METABOLIC PNL TOTAL CA: CPT

## 2020-08-29 PROCEDURE — 81001 URINALYSIS AUTO W/SCOPE: CPT

## 2020-08-29 PROCEDURE — 80307 DRUG TEST PRSMV CHEM ANLYZR: CPT

## 2020-08-29 RX ORDER — CEFDINIR 300 MG/1
300 CAPSULE ORAL ONCE
Status: COMPLETED | OUTPATIENT
Start: 2020-08-29 | End: 2020-08-30

## 2020-08-30 VITALS
SYSTOLIC BLOOD PRESSURE: 180 MMHG | DIASTOLIC BLOOD PRESSURE: 65 MMHG | TEMPERATURE: 98.1 F | RESPIRATION RATE: 16 BRPM | HEART RATE: 73 BPM | BODY MASS INDEX: 29.25 KG/M2 | WEIGHT: 149 LBS | OXYGEN SATURATION: 98 % | HEIGHT: 60 IN

## 2020-08-30 LAB
EKG ATRIAL RATE: 89 BPM
EKG P AXIS: 42 DEGREES
EKG P-R INTERVAL: 174 MS
EKG Q-T INTERVAL: 356 MS
EKG QRS DURATION: 84 MS
EKG QTC CALCULATION (BAZETT): 433 MS
EKG R AXIS: 16 DEGREES
EKG T AXIS: 101 DEGREES
EKG VENTRICULAR RATE: 89 BPM
METER GLUCOSE: 152 MG/DL (ref 74–99)
TROPONIN: 0.03 NG/ML (ref 0–0.03)

## 2020-08-30 PROCEDURE — 6370000000 HC RX 637 (ALT 250 FOR IP): Performed by: EMERGENCY MEDICINE

## 2020-08-30 PROCEDURE — 82962 GLUCOSE BLOOD TEST: CPT

## 2020-08-30 PROCEDURE — 87088 URINE BACTERIA CULTURE: CPT

## 2020-08-30 PROCEDURE — 84484 ASSAY OF TROPONIN QUANT: CPT

## 2020-08-30 RX ORDER — DIPHENHYDRAMINE HCL 25 MG
25 TABLET ORAL EVERY 6 HOURS PRN
COMMUNITY

## 2020-08-30 RX ORDER — PANTOPRAZOLE SODIUM 40 MG/1
40 TABLET, DELAYED RELEASE ORAL
Status: DISCONTINUED | OUTPATIENT
Start: 2020-08-31 | End: 2020-08-30 | Stop reason: HOSPADM

## 2020-08-30 RX ORDER — HYDROMORPHONE HYDROCHLORIDE 2 MG/1
1 TABLET ORAL ONCE
Status: COMPLETED | OUTPATIENT
Start: 2020-08-30 | End: 2020-08-30

## 2020-08-30 RX ORDER — ISOSORBIDE MONONITRATE 30 MG/1
30 TABLET, EXTENDED RELEASE ORAL ONCE
Status: COMPLETED | OUTPATIENT
Start: 2020-08-30 | End: 2020-08-30

## 2020-08-30 RX ORDER — HYDROMORPHONE HYDROCHLORIDE 1 MG/ML
1 SOLUTION ORAL
COMMUNITY

## 2020-08-30 RX ORDER — ONDANSETRON 4 MG/1
4 TABLET, FILM COATED ORAL EVERY 6 HOURS PRN
COMMUNITY

## 2020-08-30 RX ORDER — HYDROMORPHONE HYDROCHLORIDE 2 MG/1
0.5 TABLET ORAL ONCE
Status: DISCONTINUED | OUTPATIENT
Start: 2020-08-30 | End: 2020-08-30

## 2020-08-30 RX ORDER — TRAMADOL HYDROCHLORIDE 50 MG/1
50 TABLET ORAL EVERY 8 HOURS PRN
COMMUNITY

## 2020-08-30 RX ORDER — ACETAMINOPHEN 650 MG/1
650 SUPPOSITORY RECTAL EVERY 4 HOURS PRN
COMMUNITY

## 2020-08-30 RX ORDER — CEFDINIR 300 MG/1
300 CAPSULE ORAL 2 TIMES DAILY
Qty: 14 CAPSULE | Refills: 0 | Status: SHIPPED | OUTPATIENT
Start: 2020-08-30 | End: 2020-09-06

## 2020-08-30 RX ORDER — TRAMADOL HYDROCHLORIDE 50 MG/1
50 TABLET ORAL ONCE
Status: DISCONTINUED | OUTPATIENT
Start: 2020-08-30 | End: 2020-08-30 | Stop reason: HOSPADM

## 2020-08-30 RX ORDER — ACETAMINOPHEN 325 MG/1
650 TABLET ORAL EVERY 4 HOURS PRN
COMMUNITY

## 2020-08-30 RX ORDER — LORAZEPAM 1 MG/1
1 TABLET ORAL
COMMUNITY

## 2020-08-30 RX ORDER — ONDANSETRON 4 MG/1
4 TABLET, ORALLY DISINTEGRATING ORAL ONCE
Status: COMPLETED | OUTPATIENT
Start: 2020-08-30 | End: 2020-08-30

## 2020-08-30 RX ADMIN — HYDROMORPHONE HYDROCHLORIDE 1 MG: 2 TABLET ORAL at 01:27

## 2020-08-30 RX ADMIN — ISOSORBIDE MONONITRATE 30 MG: 30 TABLET ORAL at 10:50

## 2020-08-30 RX ADMIN — CEFDINIR 300 MG: 300 CAPSULE ORAL at 00:55

## 2020-08-30 RX ADMIN — ONDANSETRON 4 MG: 4 TABLET, ORALLY DISINTEGRATING ORAL at 10:50

## 2020-08-30 RX ADMIN — METOPROLOL TARTRATE 12.5 MG: 25 TABLET, FILM COATED ORAL at 10:50

## 2020-08-30 ASSESSMENT — PAIN SCALES - GENERAL: PAINLEVEL_OUTOF10: 5
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
## 2020-08-30 NOTE — ED NOTES
This rn hears pt moaning and groaning ow this rn went to  to order pain meds however when this rn tries to get medication to give pt refuses     Courtney Bermudez RN  08/30/20 8980

## 2020-08-30 NOTE — ED NOTES
MARTHA HOFFMANN WILL TRANSPORT PT BACK TO [facility] [address] [place] [address] [place] [address]  08/30/20 4034

## 2020-08-30 NOTE — ED NOTES
Pt continues to request this rn to call her daughter informed received answering machine unable to must wait until she calls us back.      Rebel Coronado RN  08/30/20 4424

## 2020-08-30 NOTE — ED NOTES
Attempted to call Dr Dante Stein NP to discuss pts case  informed this RN  Currently enroute to Team and will call this rn back once it is over.      Shawn Fang RN  08/30/20 5224

## 2020-08-30 NOTE — ED NOTES
[facility]'s [facility] has been stable and non combative. I called Iker Nevarez [phone], I spoke with her at length. Per her request, I gave her a few names of some different facilities as she is looking to have her mom moved from her current treating residence. Iker Neavrez advised that she and her sister have been leaving messages at certain facilities and have been unsuccessful in obtaining any information. I also advised to call Kavya Marin for further assistance on Monday after 0900. Abelardo Monet has concern with pt being on a non verbal floor and is requesting that she move back to the floor where she has friends. I advised that the hospital has no control as to what floor pt is placed on at the facility, but at [facility] we can enforce her request.    [facility] AN AFFILIATE OF [facility] nurse will review with psych services to inquire if pt can return back to [facility].          Harjeet Sierra, [place]  08/30/20 9881

## 2020-08-30 NOTE — ED PROVIDER NOTES
HPI:  8/29/20,   Time: 9:39 PM EDT       Brady Jordan is a 68 y.o. female presenting to the ED for agitation, beginning 1 day ago. The complaint has been intermittent, moderate in severity, and worsened by nothing. The patient resides in a nursing home. She has a history of dementia. EMS states that at the nursing home today she has been having multiple behavioral outbreaks. She has been throwing herself on the floor and trying to choke staff. Patient did receive Ativan prior to arrival.  Upon arrival to the ED the patient is alert and oriented x3. GCS 15. No acute complaints. Review of Systems:   Pertinent positives and negatives are stated within HPI, all other systems reviewed and are negative.          --------------------------------------------- PAST HISTORY ---------------------------------------------  Past Medical History:  has a past medical history of Anxiety, Asthma, Blind one eye, CAD (coronary artery disease), Carpal tunnel syndrome, bilateral, COPD (chronic obstructive pulmonary disease) ([facility] [address] Hypertension, IBS (irritable bowel syndrome), Macular degeneration, PONV (postoperative nausea and vomiting), Type II or unspecified type diabetes mellitus without mention of complication, not stated as uncontrolled, and UTI (urinary tract infection). Past Surgical History:  has a past surgical history that includes Abdominal aortic aneurysm repair; Cervical discectomy; Appendectomy; Hysterectomy; Carpal tunnel release (2005); Tonsillectomy and adenoidectomy; Nerve Block (11 30 2011); Coronary artery bypass graft; Carpal tunnel release (3/27/12); laparoscopy; eye surgery (Left); Upper gastrointestinal endoscopy (september 2015); Cardiac surgery (1991); Carpal tunnel release (Left, 07/01/2016); ERCP (N/A, 4/12/2020); ERCP (N/A, 4/12/2020); ERCP (N/A, 4/12/2020);  Upper gastrointestinal endoscopy (N/A, 6/19/2020); ERCP (N/A, 6/19/2020); ERCP (6/19/2020); ERCP (6/19/2020); and ERCP Differential   Result Value Ref Range    WBC 9.6 4.5 - 11.5 E9/L    RBC 3.59 3.50 - 5.50 E12/L    Hemoglobin 10.1 (L) 11.5 - 15.5 g/dL    Hematocrit 31.8 (L) 34.0 - 48.0 %    MCV 88.6 80.0 - 99.9 fL    MCH 28.1 26.0 - 35.0 pg    MCHC 31.8 (L) 32.0 - 34.5 %    RDW 14.5 11.5 - 15.0 fL    Platelets 694 449 - 519 E9/L    MPV 10.0 7.0 - 12.0 fL    Neutrophils % 60.4 43.0 - 80.0 %    Immature Granulocytes % 0.4 0.0 - 5.0 %    Lymphocytes % 26.8 20.0 - 42.0 %    Monocytes % 9.9 2.0 - 12.0 %    Eosinophils % 2.0 0.0 - 6.0 %    Basophils % 0.5 0.0 - 2.0 %    Neutrophils Absolute 5.76 1.80 - 7.30 E9/L    Immature Granulocytes # 0.04 E9/L    Lymphocytes Absolute 2.56 1.50 - 4.00 E9/L    Monocytes Absolute 0.95 0.10 - 0.95 E9/L    Eosinophils Absolute 0.19 0.05 - 0.50 E9/L    Basophils Absolute 0.05 0.00 - 0.20 S0/W   Basic Metabolic Panel w/ Reflex to MG   Result Value Ref Range    Sodium 131 (L) 132 - 146 mmol/L    Potassium reflex Magnesium 4.2 3.5 - 5.0 mmol/L    Chloride 93 (L) 98 - 107 mmol/L    CO2 25 22 - 29 mmol/L    Anion Gap 13 7 - 16 mmol/L    Glucose 161 (H) 74 - 99 mg/dL    BUN 30 (H) 8 - 23 mg/dL    CREATININE 1.2 (H) 0.5 - 1.0 mg/dL    GFR Non-African American 43 >=60 mL/min/1.73    GFR African American 53     Calcium 10.1 8.6 - 10.2 mg/dL   Troponin   Result Value Ref Range    Troponin 0.03 0.00 - 0.03 ng/mL   Urinalysis, reflex to microscopic   Result Value Ref Range    Color, UA Yellow Straw/Yellow    Clarity, UA Clear Clear    Glucose, Ur Negative Negative mg/dL    Bilirubin Urine Negative Negative    Ketones, Urine Negative Negative mg/dL    Specific Gravity, UA 1.025 1.005 - 1.030    Blood, Urine Negative Negative    pH, UA 6.0 5.0 - 9.0    Protein, UA >=300 (A) Negative mg/dL    Urobilinogen, Urine 0.2 <2.0 E.U./dL    Nitrite, Urine Negative Negative    Leukocyte Esterase, Urine Negative Negative   Urine Drug Screen   Result Value Ref Range    Amphetamine Screen, Urine NOT DETECTED Negative <1000 ng/mL Barbiturate Screen, Ur NOT DETECTED Negative < 200 ng/mL    Benzodiazepine Screen, Urine NOT DETECTED Negative < 200 ng/mL    Cannabinoid Scrn, Ur NOT DETECTED Negative < 50ng/mL    Cocaine Metabolite Screen, Urine NOT DETECTED Negative < 300 ng/mL    Opiate Scrn, Ur NOT DETECTED Negative < 300ng/mL    PCP Screen, Urine NOT DETECTED Negative < 25 ng/mL    Methadone Screen, Urine NOT DETECTED Negative <300 ng/mL    Oxycodone Urine NOT DETECTED Negative <100 ng/mL    FENTANYL SCREEN, URINE NOT DETECTED Negative <1 ng/mL    Drug Screen Comment: see below    Serum Drug Screen   Result Value Ref Range    Ethanol Lvl <10 mg/dL    Acetaminophen Level <5.0 (L) 10.0 - 52.2 mcg/mL    Salicylate, Serum <7.8 0.0 - 30.0 mg/dL    TCA Scrn NEGATIVE Cutoff:300 ng/mL   COVID-19   Result Value Ref Range    SARS-CoV-2, NAAT Not Detected Not Detected   Microscopic Urinalysis   Result Value Ref Range    WBC, UA 5-10 (A) 0 - 5 /HPF    RBC, UA NONE 0 - 2 /HPF    Bacteria, UA MANY (A) None Seen /HPF   Troponin   Result Value Ref Range    Troponin 0.03 0.00 - 0.03 ng/mL   EKG 12 Lead   Result Value Ref Range    Ventricular Rate 89 BPM    Atrial Rate 89 BPM    P-R Interval 174 ms    QRS Duration 84 ms    Q-T Interval 356 ms    QTc Calculation (Bazett) 433 ms    P Axis 42 degrees    R Axis 16 degrees    T Axis 101 degrees       RADIOLOGY:  Interpreted by Radiologist.  No orders to display       EKG: This EKG is signed and interpreted by the EP. EKG shows sinus rhythm with occasional PVCs 89 bpm.  Nonspecific ST-T wave changes noted. No significant ST elevations or depressions. No STEMI.    ------------------------- NURSING NOTES AND VITALS REVIEWED ---------------------------   The nursing notes within the ED encounter and vital signs as below have been reviewed by myself.   BP (!) 164/88   Pulse 78   Temp 98.1 °F (36.7 °C) (Temporal)   Resp 16   Ht 5' (1.524 m)   Wt 149 lb (67.6 kg)   SpO2 96%   BMI 29.10 kg/m²   Oxygen Saturation Interpretation: Normal    The patients available past medical records and past encounters were reviewed. ------------------------------ ED COURSE/MEDICAL DECISION MAKING----------------------  Medications   HYDROmorphone (DILAUDID) tablet 1 mg (has no administration in time range)   cefdinir (OMNICEF) capsule 300 mg (300 mg Oral Given 8/30/20 0055)         ED COURSE:       Medical Decision Making: This is a 70-year-old female presented to the ED for agitation. On arrival to the ED the patient's vital signs are stable. Patient is alert and oriented x3. GCS of 15. Patient currently asymptomatic and calm and cooperative. She underwent laboratory work-up which showed a normal CBC except for an H&H of 10.1/31.8. Patient's chemistry unremarkable except for sodium 131 BUN and creatinine of 30/1.2. Troponin was 0.03. Urinalysis consistent with UTI. Serum drug screen urine strep screen negative. Co-swab negative. EKG unchanged from previous. To the patient's troponin being 0.03 8 repeat was obtained which remained at 0.03. Patient remains asymptomatic here in the emergency department. Patient is on Dilaudid for pain control at the nursing home therefore she was given 1 mg p.o. here in the emergency department for pain control. Patient medically clear at this time.  consulted. Disposition pending  evaluation. I, Dr. Hieu Coon, am the primary provider for this encounter    This patient's ED course included: a personal history and physicial examination, re-evaluation prior to disposition and multiple bedside re-evaluations    This patient has remained hemodynamically stable during their ED course. Re-Evaluations:             Re-evaluation. Patients symptoms are improving      Counseling:    The emergency provider has spoken with the patient and discussed todays results, in addition to providing specific details for the plan of care and counseling regarding the diagnosis and prognosis. Questions are answered at this time and they are agreeable with the plan.       --------------------------------- IMPRESSION AND DISPOSITION ---------------------------------    IMPRESSION  1. Agitation    2. Dementia with behavioral disturbance, unspecified [facility]    3. Acute cystitis without hematuria        DISPOSITION  Disposition: Per   Patient condition is stable    NOTE: This report was transcribed using voice recognition software.  Every effort was made to ensure accuracy; however, inadvertent computerized transcription errors may be present        Mary Ramirez DO  08/30/20 0123

## 2020-08-30 NOTE — ED NOTES
Pt insisting this rn call her daughter however when first dialed number pt had taken hearing aid out infomed pt to put bck in hearing aid then phone given after this rn dialed [address] [facility] [employer] machine received informed pt to leave a message however pt only stating she cant she cant hear she cant see instructed her to tell person to call her back      Rebel Coronado RN  08/30/20 [id number]

## 2020-08-30 NOTE — ED NOTES
Per Leoncio NP/ Dr José Antonio Varela pt to return to nh does not meet admission criteria at this time.      Deward Kocher, RN  08/30/20 0184

## 2020-08-30 NOTE — ED NOTES
Patient changed into snap gown and yellow slipper socks. Patient had two shirts placed into a labeled belongings bag and secured into locker #30. Patient has a stuffed donovan bear in the room with her. Patient requested hearing aid be removed. Hearing aid was placed into a labeled container and placed with patient's soft chart. Patient also has a framed photo that was placed with soft chart. Patient was ambulated to bathroom with 2 assist. Soiled brief disposed and replaced with fresh brief. This BHT performed an EKG. Patient calm and cooperative. Patient currently resting in bed. Family at bedside.       Galina Tilley  08/29/20 2447

## 2020-08-30 NOTE — ED NOTES
New  discharge instructions faxed to [id number] [facility] rn @ [phone] [facility] med added to directions)  Per leonie they can order it as long as  has written in instructions for pt to start taking med     Zakia Marks RN  08/30/20 3303

## 2020-08-30 NOTE — ED NOTES
Pt has been assisted multiple times to bR either by this rn or Nilton RACHAEL pt refuses to use bed andrew Coronado RN  08/30/20 [id number]

## 2020-08-30 NOTE — ED NOTES
Spoke with [facility] psych Np informed awaiting decision as to sending pt back to nursing facility or admission to unit stated will confer with Dr Chiquita Kang at this time     Zahraa Mott RN  08/30/20 [id number]

## 2020-09-01 LAB — URINE CULTURE, ROUTINE: NORMAL

## 2020-11-03 PROBLEM — N17.9 ACUTE KIDNEY FAILURE (HCC): Status: RESOLVED | Noted: 2020-06-18 | Resolved: 2020-11-03

## 2023-03-12 NOTE — PROGRESS NOTES
PROGRESS NOTE    By Derek Rivera D.O GI Fellow    [facility]  Dr. Blanquita Boyd MD, Dr. Corrina Jewell MD, Dr Isa Alberto, Dr. Twylla Bloch, MD, Dr. Nam Martinez, DO      Yina Fang  70 y.o.  female    SUBJECTIVE: Pt denies any blood in her stool or abdominal pain. OBJECTIVE:    BP (!) 105/51   Pulse 72   Temp 97.6 °F (36.4 °C) (Oral)   Resp 18   Ht 4' 11\" (1.499 m)   Wt 143 lb 1.6 oz (64.9 kg)   SpO2 95%   BMI 28.90 kg/m²     Gen: NAD, oreitnted to self   HEENT:PEERL, no icterus  Heart: RRR, no M/R/G  Lungs: CTAB  Abd.: soft, NT, ND, BS +, no G/R, no HSM  Extr.: no C/C/E, no bruising      Stool (measured) : 0 mL  Lab Results   Component Value Date    WBC 13.2 04/13/2020    WBC 8.5 04/12/2020    WBC 8.5 04/11/2020    HGB 7.3 04/13/2020    HGB 7.4 04/12/2020    HGB 7.9 04/12/2020    HCT 23.7 04/13/2020    MCV 98.3 04/13/2020    RDW 14.9 04/13/2020     04/13/2020     04/12/2020     04/11/2020     Lab Results   Component Value Date     04/13/2020    K 5.6 04/13/2020     04/13/2020    CO2 18 04/13/2020    BUN 16 04/13/2020    CREATININE 0.9 04/13/2020    CALCIUM 8.3 04/13/2020    PROT 6.1 04/13/2020    LABALBU 2.7 04/13/2020    BILITOT 2.0 04/13/2020    BILITOT 2.4 04/12/2020    BILITOT 2.4 04/11/2020    ALKPHOS 1,342 04/13/2020    ALKPHOS 1,672 04/12/2020    ALKPHOS 1,528 04/11/2020    AST 78 04/13/2020     04/12/2020     04/11/2020     04/13/2020     04/12/2020     04/11/2020     Lab Results   Component Value Date    LIPASE 138 04/12/2020     No results found for: AMYLASE      ASSESSMENT/PLAN:  1.  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

## (undated) DEVICE — TUBING, SUCTION, 1/4" X 10', STRAIGHT: Brand: MEDLINE

## (undated) DEVICE — RETRIEVAL BALLOON CATHETER: Brand: EXTRACTOR™ PRO RX

## (undated) DEVICE — SYSTEM INJ BILI RAP REFIL CONT

## (undated) DEVICE — PATIENT RETURN ELECTRODE, SINGLE-USE, CONTACT QUALITY MONITORING, ADULT, WITH 9FT CORD, FOR PATIENTS WEIGING OVER 33LBS. (15KG): Brand: MEGADYNE

## (undated) DEVICE — TUBING INSUFFLATION CAP W/ EXT CARBON DIOX ENDO SMARTCAP

## (undated) DEVICE — BITEBLOCK 54FR W/ DENT RIM BLOX

## (undated) DEVICE — SYSTEM BX CAP BILI RAP EXCHG CAP LOK DEV COMPATIBLE W/ OLY

## (undated) DEVICE — WIREGUIDED CYTOLOGY BRUSH: Brand: RX CYTOLOGY BRUSH

## (undated) DEVICE — SNARE ENDOSCP L240CM LOOP W13MM SHTH DIA2.4MM SM OVL FLX

## (undated) DEVICE — CONTAINER SPEC COLL 960ML POLYPR TRIANG GRAD INTAKE/OUTPUT

## (undated) DEVICE — GOWN ISOLATN REG YEL M WT MULTIPLY SIDETIE LEV 2

## (undated) DEVICE — Device: Brand: BALLOON3

## (undated) DEVICE — MEDIA CONTRAST ISOVUE  300 10X50ML

## (undated) DEVICE — ELECTRODE PT RET AD L9FT HI MOIST COND ADH HYDRGEL CORDED

## (undated) DEVICE — VALVE SUCTION AIR H2O SET ORCA POD + DISP

## (undated) DEVICE — NEEDLE ASPIR 22GA L2.4MM SHTH DIA1.52MM ENDO US EXPECT SLM

## (undated) DEVICE — KIT BEDSIDE REVITAL OX 500ML

## (undated) DEVICE — TRIPLE LUMEN NEEDLE KNIFE: Brand: MICROKNIFE XL

## (undated) DEVICE — BLOCK BITE 60FR CAREGUARD

## (undated) DEVICE — SPHINCTEROTOME: Brand: HYDRATOME RX 44

## (undated) DEVICE — GAUZE,SPONGE,POST-OP,4X3,STRL,LF: Brand: MEDLINE

## (undated) DEVICE — VALVE SUCTION AIR H2O HYDR H2O JET CONN STRL ORCA POD + DISP

## (undated) DEVICE — CANNULATING SPHINCTEROTOME: Brand: JAGTOME™ REVOLUTION RX

## (undated) DEVICE — SPONGE GZ 4IN 4IN 4 PLY N WVN AVANT